# Patient Record
Sex: MALE | Race: AMERICAN INDIAN OR ALASKA NATIVE | Employment: OTHER | URBAN - METROPOLITAN AREA
[De-identification: names, ages, dates, MRNs, and addresses within clinical notes are randomized per-mention and may not be internally consistent; named-entity substitution may affect disease eponyms.]

---

## 2017-01-04 ENCOUNTER — HOSPITAL ENCOUNTER (OUTPATIENT)
Dept: PHYSICAL THERAPY | Age: 77
Discharge: HOME | End: 2017-01-04
Payer: MEDICARE

## 2017-01-04 PROCEDURE — 97113 AQUATIC THERAPY/EXERCISES: CPT

## 2017-01-04 NOTE — PROGRESS NOTES
Milagro Pineda   (:1940) Fallon Rushomon [address]  [address] [place] [address]  [phone]   [phone]           Outpatient PHYSICAL THERAPY: Daily Note 2017  Fall Risk Score: 2 (? 5 = High Risk)    ICD-10: Treatment Diagnosis: Pain in left knee (M25.562)  Difficulty in walking, not elsewhere classified (R26.2)  REFERRING PHYSICIAN: Indiana Ramos MD MD Orders: Eval and Treat   Return Physician Appointment: 16  MEDICAL/REFERRING DIAGNOSIS: Total knee replacement status [Z96.659]  DATE OF ONSET: 10/28/16   PRIOR LEVEL OF FUNCTION: independent  PRECAUTIONS/ALLERGIES:   Allergies   Allergen Reactions    Prednisone Unknown (comments)       ASSESSMENT:  ?????? ? ? This section established at most recent assessment?????????? Pt is a 63 y/o male L knee pain and difficulty walking s/p L TKA L. Pt has L knee edema, decreased ROM and strength as well as altered gait and decreased balance. Pt will benefit from PT to address gait, balance, ROM, strengthening and pain/edema control. Pt will begin in aquatic environment to increase mobility and gait in decreased load environment secondary to OA and unrelated LBP to return to increased functional activities. PROBLEM LIST (Impairments causing functional limitations):  1. Decreased Strength affecting function  2. Edema affecting function  3. Decreased ADL/Functional Activities  4. Decreased Flexibility/joint mobility  5. Decreased transfer abilities  6. Increased Pain affecting function  7. Decreased Activity tolerance   GOALS: (Goals have been discussed and agreed upon with patient.)  SHORT-TERM FUNCTIONAL GOALS: Time Frame: 3 weeks  1. Pt will increase L knee AROM extension to within 2° of neutral for improved gait mechanics. (MET)  2. Pt will be able to transfer from chairs sit to stand with only use of one UE.    3.  Pt will be independent with HEP to increase mobility and strength to increase functional mobility. (MET)  DISCHARGE GOALS: Time Frame: 8-12 weeks  1. Pt will be able to ambulate up/down one flight of stairs reciprocally with min use of one hand rail. 2. Pt will be able to ambulate community distances with normalized gait independently with only intermittent use of cane for contralateral knee. (MET)  3. Pt will demonstrate at least 4+/5 L LE strength for squatting and other functional demands. 5. Pt will increase L knee AROM 0-125 ° or greater for increased functional mobility. (MET)  6. Pt will be able to single leg stance at least 10 seconds LLE to increase balance. REHABILITATION POTENTIAL FOR STATED GOALS: ExcellentPLAN OF CARE:  INTERVENTIONS PLANNED: (Benefits and precautions of physical therapy have been discussed with the patient.)  1. balance exercise  2. electrical stimulation  3. gait training  4. heat  5. home exercise program (HEP)  6. manual therapy  7. neuromuscular re-education/strengthening  8. range of motion: active/assisted/passive  9. therapeutic activities  10. therapeutic exercise/strengthening  11. transfer training  12. aquatics  TREATMENT PLAN EFFECTIVE DATES: 12/14/16 TO 03/08/17  FREQUENCY/DURATION: Follow patient 2-3 times a week for 8-12 weeks to address above goals. SUBJECTIVE:    History of Present Injury/Illness (Reason for Referral): Pt has chronic bilateral knee pain secondary to OA and underwent TKA 10/28/16. Pt was progressing well with home health and then discharged to outpatient at the end of Nov. 2016. Pt had to cancel his previous initial evaluation and delayed rescheduling initial evaluation for outpatient PT. Pt states he wants to be as active as he can and \"be able to do everything. \"  Pt states he is planning on having a TKA with his R knee after rehab of this L knee. Pt reports difficulty with transfers still, decreased tolerance with walking, as well as difficulty with stairs and squatting.   Pt states he has problems with sleeping but also has LBP that keeps him from sleeping well. Present Symptoms: Pt reports doing well with no pain. States his surgery date has been moved up to  for R TKA  Dominant Side: right  Past Medical History:   Past Medical History   Diagnosis Date    GERD (gastroesophageal reflux disease)     Hypertension    DM --controlled with meds  THR   Pt reports over a year ago seeing Dr. Pratibha Rodriguez for LBP and having a fracture around L1 that was supposed to heal on its own; pt has some LBP and is following up with Dr. Bib oHwe.     Current Medications:   Current Outpatient Prescriptions on File Prior to Encounter   Medication Sig Dispense Refill    amLODIPine (NORVASC) 2.5 mg tablet       metFORMIN (GLUCOPHAGE) 500 mg tablet       omeprazole (PRILOSEC) 40 mg capsule       triamcinolone acetonide (KENALOG) 0.1 % topical cream       zolpidem (AMBIEN) 5 mg tablet Take  by mouth nightly as needed for Sleep.  sucralfate (CARAFATE) 1 gram tablet Take 1 g by mouth four (4) times daily. No current facility-administered medications on file prior to encounter. Using Tylenol prn for pain     Date Last Reviewed: 2017    Social History/Home Situation: lives in home and his son living with him; states he is able to do housework and yard work prior to surgery; wife  earlier this year and had cared for her over the past few years     Work/Activity History: retired ; enjoys staying active; sings at [employer] regularly  OBJECTIVE:    Outcome Measure: Tool Used: Lower Extremity Functional Scale (LEFS)  Score:  Initial: 35/80 Most Recent:  (Date: -- )   Interpretation of Score: 20 questions each scored on a 5 point scale with 0 representing \"extreme difficulty or unable to perform\" and 4 representing \"no difficulty\". The lower the score, the greater the functional disability. 80/80 represents no disability. Minimal detectable change is 9 points.   Score 80 79-63 62-48 47-32 31-16 15-1 0   Modifier CH CI CJ CK CL CM CN     ? Mobility - Walking and Moving Around:     - CURRENT STATUS: CK - 40%-59% impaired, limited or restricted    - GOAL STATUS:  CJ - 20%-39% impaired, limited or restricted--y because R knee may impact functional level    - D/C STATUS:  ---------------To be determined---------------    Observation/Orthostatic Postural Assessment:    increased rounded shoulders; standing with weigh shifted more on RLE  Palpation:   mild tenderness around L knee with mild edema L knee; decreased scar mobility   ROM:    AROM L Knee: 10 ° extensor lag with extension    Flexion to 113 °   AAROM 2-120 °    12/30/16  L knee 0-129 °               AROM R knee: 0-132 °          Strength:    Hip Flexion: L3+/5 R 4-/5   Quadricep:  L 4-/5    R 4+/5   Hamstring:   L4-/5   R: 4+/5   Ant Tibialis:  L4+/5   R 5/5              Special Tests: deferred  Gait: Ambulating with straight point cane with decreased L knee terminal knee extension   Functional Mobility:   sit to stand Mod I with use of UEs   Balance:    decreased dynamic balance single leg stance less than 3 seconds bilaterally  TREATMENT:    (In addition to Assessment/Re-Assessment sessions the following treatments were rendered)  Aquatic Therapy (see chart below for minutes): Aquatic treatment performed per flow grid for Decreased muscle strength, Decreased range of motion and Ease of movement. Cues provided for technique. Assistance by therapist provided for progression of exercises. Patient has difficulty with L knee ROM. THERAPEUTIC EXERCISE: (See chart below for minutes):  Exercises per grid below to improve mobility and strength. Required minimal verbal cues to promote proper body mechanics.       Date: 12/14/16 12/16/16 12/19/16 12/21/16 12/28/16 12/30/16 1/4/16   Modalities: 10 minutes 10 min 10 min 15 min      IFC/e-stim with ice anteroir L knee 10 min in long seated position In long sitting to L knee 10 min long siting 15 min long sitting                          Therapeutic Exercise: 15 minutes    45 min     Quad sets x15         Heel slides x15         SLR x20    x15 BLE 2#     Seated hamstring stretch 3 H 30         LAQ 2# x15         Seated marching     x15 2# BLE 2#     Side lying abd     x15 2# BLE      Heel/toe     x15     squats     x15     Hamstring curls x15 standing    Seated green TB x15 BLE     Step ups     x10 BLE     Aquatic Exercises next 1.5# 45 min 2.5# 45 min 2.5# 40 min 3.75# 55 min  3.75# 55 mi   Gait (Forward, Backward, Sidestepping, High Knee March)   x4L each x4L ea x4L x4L with open paddles   open paddles x4L   SLR gait  x4L x4L x4L x4L   x4L   Hamstring curl gait  x4L x4L x4L x4L  x4L   Toe/ heel raise  x2L each x2L ea x2L Single leg x15  Single leg x15   Hip 3 way  x15 BLE x15 BLE  x15 BLE     Squats  x15 x15 x15 Single leg x15  Single leg x15   Deep well:  cycling  3 min 3 min 3 min 3 min  3 min   Deep well:  Jumping jacks  2 min 2 min 2 min 2 min  2 min   Deep well:  scissoring  2 min 2 min 2 min 2 min  2 min   Step lunges  x15 LLE x15 LLE x15 LLE x15 LLE  x15   Hamstring stretch  2H30 BLE 2H30 2H30 2H30     Step ups   x15 LLE x15 LLE x15 LLE  x15 LLE   Core; UE flex/ext staggered stance       x15 BLE open paddles   Manual Therapy: 8 minutes         Scar cross-friction mobilization  L knee 8 minutes                   Post-session Pain Level 0/10                                                     MANUAL THERAPY: (See chart above for minutes): MODALITIES: (See chart above for minutes)      HEP: As above; handouts given to patient for all exercises. Pt has home health HEP but has not been a diligent as he thinks he needs to be.  ______________________________________________________________________________________________________    Treatment Assessment: Continued aquatics and pt tolerated well.  Performed exercises without railing today to increase proprioception which pt struggles with. Plan to continue adding  balance activities in next visits. Pt reported 0/10 pain at end of treatment. Progression/Medical Necessity:   · Patient is expected to demonstrate progress in strength, range of motion and balance to increase independence with gait and daily activities. Compliance with Program/Exercises: Will assess as treatment progresses. Reason for Continuation of Services/Other Comments:  · Patient continues to require skilled intervention due to altered gait and difficulty with functional activities. Recommendations/Intent for next treatment session: \"Treatment next visit will focus on beginning aquatics\". Plan to continue aquatics 2 more visits and then d/c pending R TKA Jan 23.   PT Patient Time In/Time Out  Time In: 0920  Time Out: [address], PTA

## 2017-01-06 ENCOUNTER — HOSPITAL ENCOUNTER (OUTPATIENT)
Dept: PHYSICAL THERAPY | Age: 77
Discharge: HOME | End: 2017-01-06
Payer: MEDICARE

## 2017-01-06 PROCEDURE — 97113 AQUATIC THERAPY/EXERCISES: CPT

## 2017-01-06 NOTE — PROGRESS NOTES
Priscila Esparza   (:1940) [phone] [facility]  [address] [place] [address].  [phone]   [phone]           Outpatient PHYSICAL THERAPY: Daily Note 2017  Fall Risk Score: 2 (? 5 = High Risk)    ICD-10: Treatment Diagnosis: Pain in left knee (M25.562)  Difficulty in walking, not elsewhere classified (R26.2)  REFERRING PHYSICIAN: Rashmi Jarrett MD MD Orders: Eval and Treat   Return Physician Appointment: 16  MEDICAL/REFERRING DIAGNOSIS: Total knee replacement status [Z96.659]  DATE OF ONSET: 10/28/16   PRIOR LEVEL OF FUNCTION: independent  PRECAUTIONS/ALLERGIES:   Allergies   Allergen Reactions    Prednisone Unknown (comments)       ASSESSMENT:  ?????? ? ? This section established at most recent assessment?????????? Pt is a 63 y/o male L knee pain and difficulty walking s/p L TKA L. Pt has L knee edema, decreased ROM and strength as well as altered gait and decreased balance. Pt will benefit from PT to address gait, balance, ROM, strengthening and pain/edema control. Pt will begin in aquatic environment to increase mobility and gait in decreased load environment secondary to OA and unrelated LBP to return to increased functional activities. PROBLEM LIST (Impairments causing functional limitations):  1. Decreased Strength affecting function  2. Edema affecting function  3. Decreased ADL/Functional Activities  4. Decreased Flexibility/joint mobility  5. Decreased transfer abilities  6. Increased Pain affecting function  7. Decreased Activity tolerance   GOALS: (Goals have been discussed and agreed upon with patient.)  SHORT-TERM FUNCTIONAL GOALS: Time Frame: 3 weeks  1. Pt will increase L knee AROM extension to within 2° of neutral for improved gait mechanics. (MET)  2. Pt will be able to transfer from chairs sit to stand with only use of one UE.    3.  Pt will be independent with HEP to increase mobility and strength to increase functional mobility. (MET)  DISCHARGE GOALS: Time Frame: 8-12 weeks  1. Pt will be able to ambulate up/down one flight of stairs reciprocally with min use of one hand rail. 2. Pt will be able to ambulate community distances with normalized gait independently with only intermittent use of cane for contralateral knee. (MET)  3. Pt will demonstrate at least 4+/5 L LE strength for squatting and other functional demands. 5. Pt will increase L knee AROM 0-125 ° or greater for increased functional mobility. (MET)  6. Pt will be able to single leg stance at least 10 seconds LLE to increase balance. REHABILITATION POTENTIAL FOR STATED GOALS: ExcellentPLAN OF CARE:  INTERVENTIONS PLANNED: (Benefits and precautions of physical therapy have been discussed with the patient.)  1. balance exercise  2. electrical stimulation  3. gait training  4. heat  5. home exercise program (HEP)  6. manual therapy  7. neuromuscular re-education/strengthening  8. range of motion: active/assisted/passive  9. therapeutic activities  10. therapeutic exercise/strengthening  11. transfer training  12. aquatics  TREATMENT PLAN EFFECTIVE DATES: 12/14/16 TO 03/08/17  FREQUENCY/DURATION: Follow patient 2-3 times a week for 8-12 weeks to address above goals. SUBJECTIVE:    History of Present Injury/Illness (Reason for Referral): Pt has chronic bilateral knee pain secondary to OA and underwent TKA 10/28/16. Pt was progressing well with home health and then discharged to outpatient at the end of Nov. 2016. Pt had to cancel his previous initial evaluation and delayed rescheduling initial evaluation for outpatient PT. Pt states he wants to be as active as he can and \"be able to do everything. \"  Pt states he is planning on having a TKA with his R knee after rehab of this L knee. Pt reports difficulty with transfers still, decreased tolerance with walking, as well as difficulty with stairs and squatting.   Pt states he has problems with sleeping but also has LBP that keeps him from sleeping well. Present Symptoms: Pt reports doing well with no pain. States he is having an MRI next week for low back issue. Dominant Side: right  Past Medical History:   Past Medical History   Diagnosis Date    GERD (gastroesophageal reflux disease)     Hypertension    DM --controlled with meds  THR   Pt reports over a year ago seeing Dr. Krystina Venegas for LBP and having a fracture around L1 that was supposed to heal on its own; pt has some LBP and is following up with Dr. Miguel Hanna.     Current Medications:   Current Outpatient Prescriptions on File Prior to Encounter   Medication Sig Dispense Refill    amLODIPine (NORVASC) 2.5 mg tablet       metFORMIN (GLUCOPHAGE) 500 mg tablet       omeprazole (PRILOSEC) 40 mg capsule       triamcinolone acetonide (KENALOG) 0.1 % topical cream       zolpidem (AMBIEN) 5 mg tablet Take  by mouth nightly as needed for Sleep.  sucralfate (CARAFATE) 1 gram tablet Take 1 g by mouth four (4) times daily. No current facility-administered medications on file prior to encounter. Using Tylenol prn for pain     Date Last Reviewed: 2017    Social History/Home Situation: lives in home and his son living with him; states he is able to do housework and yard work prior to surgery; wife  earlier this year and had cared for her over the past few years     Work/Activity History: retired ; enjoys staying active; sings at [employer] regularly  OBJECTIVE:    Outcome Measure: Tool Used: Lower Extremity Functional Scale (LEFS)  Score:  Initial: 35/80 Most Recent:  (Date: -- )   Interpretation of Score: 20 questions each scored on a 5 point scale with 0 representing \"extreme difficulty or unable to perform\" and 4 representing \"no difficulty\". The lower the score, the greater the functional disability. 80/80 represents no disability. Minimal detectable change is 9 points.   Score 80 79-63 62-48 47-32 31-16 15-1 0   Modifier CH CI CJ CK CL CM CN     ? Mobility - Walking and Moving Around:     - CURRENT STATUS: CK - 40%-59% impaired, limited or restricted    - GOAL STATUS:  CJ - 20%-39% impaired, limited or restricted--y because R knee may impact functional level    - D/C STATUS:  ---------------To be determined---------------    Observation/Orthostatic Postural Assessment:    increased rounded shoulders; standing with weigh shifted more on RLE  Palpation:   mild tenderness around L knee with mild edema L knee; decreased scar mobility   ROM:    AROM L Knee: 10 ° extensor lag with extension    Flexion to 113 °   AAROM 2-120 °    12/30/16  L knee 0-129 °               AROM R knee: 0-132 °          Strength:    Hip Flexion: L3+/5 R 4-/5   Quadricep:  L 4-/5    R 4+/5   Hamstring:   L4-/5   R: 4+/5   Ant Tibialis:  L4+/5   R 5/5              Special Tests: deferred  Gait: Ambulating with straight point cane with decreased L knee terminal knee extension   Functional Mobility:   sit to stand Mod I with use of UEs   Balance:    decreased dynamic balance single leg stance less than 3 seconds bilaterally  TREATMENT:    (In addition to Assessment/Re-Assessment sessions the following treatments were rendered)  Aquatic Therapy (see chart below for minutes): Aquatic treatment performed per flow grid for Decreased muscle strength, Decreased range of motion and Ease of movement. Cues provided for technique. Assistance by therapist provided for progression of exercises. Patient has difficulty with L knee ROM. THERAPEUTIC EXERCISE: (See chart below for minutes):  Exercises per grid below to improve mobility and strength. Required minimal verbal cues to promote proper body mechanics.       Date: 12/14/16 12/16/16 12/19/16 12/21/16 12/28/16 12/30/16 1/4/16 1/6/17   Modalities: 10 minutes 10 min 10 min 15 min       IFC/e-stim with ice anteroir L knee 10 min in long seated position In long sitting to L knee 10 min long siting 15 min long sitting                             Therapeutic Exercise: 15 minutes    45 min      Quad sets x15          Heel slides x15          SLR x20    x15 BLE 2#      Seated hamstring stretch 3 H 30          LAQ 2# x15          Seated marching     x15 2# BLE 2#      Side lying abd     x15 2# BLE       Heel/toe     x15      squats     x15      Hamstring curls x15 standing    Seated green TB x15 BLE      Step ups     x10 BLE      Aquatic Exercises next 1.5# 45 min 2.5# 45 min 2.5# 40 min 3.75# 55 min  3.75# 55 mi 3.75# 55 min   Gait (Forward, Backward, Sidestepping, High Knee March)   x4L each x4L ea x4L x4L with open paddles   open paddles x4L Closed paddles x4L   SLR gait  x4L x4L x4L x4L   x4L x4L   Hamstring curl gait  x4L x4L x4L x4L  x4L x4L   Toe/ heel raise  x2L each x2L ea x2L Single leg x15  Single leg x15 Single leg x20   Hip 3 way  x15 BLE x15 BLE  x15 BLE      Squats  x15 x15 x15 Single leg x15  Single leg x15 Single leg x20   Deep well:  cycling  3 min 3 min 3 min 3 min  3 min 3 min   Deep well:  Jumping jacks  2 min 2 min 2 min 2 min  2 min 2 min   Deep well:  scissoring  2 min 2 min 2 min 2 min  2 min 2 min   Step lunges  x15 LLE x15 LLE x15 LLE x15 LLE  x15 x20LLE   Hamstring stretch  2H30 BLE 2H30 2H30 2H30      Step ups   x15 LLE x15 LLE x15 LLE  x15 LLE x20 LLE   Core; UE flex/ext staggered stance       x15 BLE open paddles x15 closed paddles BLE   Manual Therapy: 8 minutes          Scar cross-friction mobilization  L knee 8 minutes                     Post-session Pain Level 0/10                                                          MANUAL THERAPY: (See chart above for minutes): MODALITIES: (See chart above for minutes)      HEP: As above; handouts given to patient for all exercises.   Pt has home health HEP but has not been a diligent as he thinks he needs to be.  ______________________________________________________________________________________________________    Treatment Assessment: Continued aquatics and pt tolerated well. Performed exercises without railing today to increase proprioception which pt struggles with. Pt has progressed well and has upcoming R knee TKA, plan to d/c next visit. Pt reported 0/10 pain at end of treatment. Progression/Medical Necessity:   · Patient is expected to demonstrate progress in strength, range of motion and balance to increase independence with gait and daily activities. Compliance with Program/Exercises: Will assess as treatment progresses. Reason for Continuation of Services/Other Comments:  · Patient continues to require skilled intervention due to altered gait and difficulty with functional activities. Recommendations/Intent for next treatment session: \"Treatment next visit will focus on beginning aquatics\". Plan to continue aquatics 2 more visits and then d/c pending R TKA Jan 23.   PT Patient Time In/Time Out  Time In: 0930  Time Out: [address], PTA

## 2017-01-09 ENCOUNTER — APPOINTMENT (OUTPATIENT)
Dept: PHYSICAL THERAPY | Age: 77
End: 2017-01-09
Payer: MEDICARE

## 2017-01-11 ENCOUNTER — HOSPITAL ENCOUNTER (OUTPATIENT)
Dept: PHYSICAL THERAPY | Age: 77
Discharge: HOME | End: 2017-01-11
Payer: MEDICARE

## 2017-01-11 PROCEDURE — G8979 MOBILITY GOAL STATUS: HCPCS

## 2017-01-11 PROCEDURE — 97113 AQUATIC THERAPY/EXERCISES: CPT

## 2017-01-11 PROCEDURE — G8978 MOBILITY CURRENT STATUS: HCPCS

## 2017-01-11 PROCEDURE — G8980 MOBILITY D/C STATUS: HCPCS

## 2017-01-11 NOTE — PROGRESS NOTES
Flaquito Qiu   [phone] [facility],[address]  [facility] [address]  [phone]   [phone]           Outpatient PHYSICAL THERAPY: Daily Note and Discharge 2017  Fall Risk Score: 2 (? 5 = High Risk)    ICD-10: Treatment Diagnosis: Pain in left knee (M25.562)  Difficulty in walking, not elsewhere classified (R26.2)  REFERRING PHYSICIAN: Galileo Meier MD MD Orders: Eval and Treat   Return Physician Appointment: 16  MEDICAL/REFERRING DIAGNOSIS: Total knee replacement status [Z96.659]  DATE OF ONSET: 10/28/16   PRIOR LEVEL OF FUNCTION: independent  PRECAUTIONS/ALLERGIES:   Allergies   Allergen Reactions    Prednisone Unknown (comments)       ASSESSMENT:  ?????? ? ? This section established at most recent assessment?????????? Pt is a 65 y/o male L knee pain and difficulty walking s/p L TKA L. Pt has L knee edema, decreased ROM and strength as well as altered gait and decreased balance. Pt will benefit from PT to address gait, balance, ROM, strengthening and pain/edema control. Pt will begin in aquatic environment to increase mobility and gait in decreased load environment secondary to OA and unrelated LBP to return to increased functional activities. PROBLEM LIST (Impairments causing functional limitations):  1. Decreased Strength affecting function  2. Edema affecting function  3. Decreased ADL/Functional Activities  4. Decreased Flexibility/joint mobility  5. Decreased transfer abilities  6. Increased Pain affecting function  7. Decreased Activity tolerance   GOALS: (Goals have been discussed and agreed upon with patient.)  SHORT-TERM FUNCTIONAL GOALS: Time Frame: 3 weeks  1. Pt will increase L knee AROM extension to within 2° of neutral for improved gait mechanics. (MET)  2. Pt will be able to transfer from chairs sit to stand with only use of one UE.  (MET)  3.  Pt will be independent with HEP to increase mobility and strength to increase functional mobility. (MET)  DISCHARGE GOALS: Time Frame: 8-12 weeks  1. Pt will be able to ambulate up/down one flight of stairs reciprocally with min use of one hand rail. MET)  2. Pt will be able to ambulate community distances with normalized gait independently with only intermittent use of cane for contralateral knee. (MET)  3. Pt will demonstrate at least 4+/5 L LE strength for squatting and other functional demands. (MET)  5. Pt will increase L knee AROM 0-125 ° or greater for increased functional mobility. (MET)  6. Pt will be able to single leg stance at least 10 seconds LLE to increase balance. (MET)  REHABILITATION POTENTIAL FOR STATED GOALS: ExcellentPLAN OF CARE:  INTERVENTIONS PLANNED: (Benefits and precautions of physical therapy have been discussed with the patient.)  1. balance exercise  2. electrical stimulation  3. gait training  4. heat  5. home exercise program (HEP)  6. manual therapy  7. neuromuscular re-education/strengthening  8. range of motion: active/assisted/passive  9. therapeutic activities  10. therapeutic exercise/strengthening  11. transfer training  12. aquatics  TREATMENT PLAN EFFECTIVE DATES: 12/14/16 TO 03/08/17  FREQUENCY/DURATION: Follow patient 2-3 times a week for 8-12 weeks to address above goals. SUBJECTIVE:    History of Present Injury/Illness (Reason for Referral): Pt has chronic bilateral knee pain secondary to OA and underwent TKA 10/28/16. Pt was progressing well with home health and then discharged to outpatient at the end of Nov. 2016. Pt had to cancel his previous initial evaluation and delayed rescheduling initial evaluation for outpatient PT. Pt states he wants to be as active as he can and \"be able to do everything. \"  Pt states he is planning on having a TKA with his R knee after rehab of this L knee. Pt reports difficulty with transfers still, decreased tolerance with walking, as well as difficulty with stairs and squatting.   Pt states he has problems with sleeping but also has LBP that keeps him from sleeping well. Present Symptoms: Pt reports doing well with no pain and will have pre op tomorrow for R TKA. Dominant Side: right  Past Medical History:   Past Medical History   Diagnosis Date    GERD (gastroesophageal reflux disease)     Hypertension    DM --controlled with meds  THR   Pt reports over a year ago seeing Dr. Bennie Mei for LBP and having a fracture around L1 that was supposed to heal on its own; pt has some LBP and is following up with Dr. Andreea Barr.     Current Medications:   Current Outpatient Prescriptions on File Prior to Encounter   Medication Sig Dispense Refill    amLODIPine (NORVASC) 2.5 mg tablet       metFORMIN (GLUCOPHAGE) 500 mg tablet       omeprazole (PRILOSEC) 40 mg capsule       triamcinolone acetonide (KENALOG) 0.1 % topical cream       zolpidem (AMBIEN) 5 mg tablet Take  by mouth nightly as needed for Sleep.  sucralfate (CARAFATE) 1 gram tablet Take 1 g by mouth four (4) times daily. No current facility-administered medications on file prior to encounter. 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 The lower the score, the greater the functional disability. 80/80 represents no disability.   Minimal detectable change is 9 points. Score 80 79-63 62-48 47-32 31-16 15-1 0   Modifier CH CI CJ CK CL CM CN     ? Mobility - Walking and Moving Around:     - CURRENT STATUS: CJ - 20%-39% impaired, limited or restricted    - GOAL STATUS:  CJ - 20%-39% impaired, limited or restricted--y because R knee may impact functional level    - D/C STATUS:  CJ - 20%-39% impaired, limited or restricted    Observation/Orthostatic Postural Assessment:    increased rounded shoulders; standing with weigh shifted more on RLE  Palpation:   mild tenderness around L knee with mild edema L knee; decreased scar mobility   ROM:    AROM L Knee: 10 ° extensor lag with extension    Flexion to 113 °   AAROM 2-120 °    12/30/16  L knee 0-129 °                   AROM R knee: 0-132 °    1/11/17 R KNEE 0-132 °            Strength:    Hip Flexion: L3+/5 R 4-/5   Quadricep:  L 4-/5    R 4+/5   Hamstring:   L4-/5   R: 4+/5   Ant Tibialis:  L4+/5   R 5/5     1/11/17:  L knee: hip flexion 4+/5              Knee flexion :4+/5              Knee extension: 4+/5         Special Tests: deferred  Gait: Ambulating with straight point cane with decreased L knee terminal knee extension   Functional Mobility:   sit to stand Mod I with use of UEs   Balance:      TREATMENT:    (In addition to Assessment/Re-Assessment sessions the following treatments were rendered)  Aquatic Therapy (see chart below for minutes): Aquatic treatment performed per flow grid for Decreased muscle strength, Decreased range of motion and Ease of movement. Cues provided for technique. Assistance by therapist provided for progression of exercises. Patient has difficulty with L knee ROM. THERAPEUTIC EXERCISE: (See chart below for minutes):  Exercises per grid below to improve mobility and strength. Required minimal verbal cues to promote proper body mechanics.       Date: 12/14/16 12/16/16 12/19/16 12/21/16 12/28/16 12/30/16 1/4/16 1/6/17 1/11/17   Modalities: 10 minutes 10 min 10 min 15 min IFC/e-stim with ice anteroir L knee 10 min in long seated position In long sitting to L knee 10 min long siting 15 min long sitting                                Therapeutic Exercise: 15 minutes    45 min       Quad sets x15           Heel slides x15           SLR x20    x15 BLE 2#       Seated hamstring stretch 3 H 30           LAQ 2# x15           Seated marching     x15 2# BLE 2#       Side lying abd     x15 2# BLE        Heel/toe     x15       squats     x15       Hamstring curls x15 standing    Seated green TB x15 BLE       Step ups     x10 BLE       Aquatic Exercises next 1.5# 45 min 2.5# 45 min 2.5# 40 min 3.75# 55 min  3.75# 55 mi 3.75# 55 min 33. 75# 55 MIN   Gait (Forward, Backward, Sidestepping, High Knee March)   x4L each x4L ea x4L x4L with open paddles   open paddles x4L Closed paddles x4L Closed paddles x4L   SLR gait  x4L x4L x4L x4L   x4L x4L x4L   Hamstring curl gait  x4L x4L x4L x4L  x4L x4L x4L   Toe/ heel raise  x2L each x2L ea x2L Single leg x15  Single leg x15 Single leg x20 single x20   Hip 3 way  x15 BLE x15 BLE  x15 BLE       Squats  x15 x15 x15 Single leg x15  Single leg x15 Single leg x20 Single x20   Deep well:  cycling  3 min 3 min 3 min 3 min  3 min 3 min 3 min   Deep well:  Jumping jacks  2 min 2 min 2 min 2 min  2 min 2 min 2 min   Deep well:  scissoring  2 min 2 min 2 min 2 min  2 min 2 min 2 min   Step lunges  x15 LLE x15 LLE x15 LLE x15 LLE  x15 x20LLE x2L   Hamstring stretch  2H30 BLE 2H30 2H30 2H30       Step ups   x15 LLE x15 LLE x15 LLE  x15 LLE x20 LLE Stairs one flight one railing up/down   SLS         2x10 sec LLE   Core; UE flex/ext staggered stance       x15 BLE open paddles x15 closed paddles BLE    Manual Therapy: 8 minutes           Scar cross-friction mobilization  L knee 8 minutes                       Post-session Pain Level 0/10                                                               MANUAL THERAPY: (See chart above for minutes):   MODALITIES: (See chart above for minutes)      HEP: As above; handouts given to patient for all exercises. ______________________________________________________________________________________________________    Treatment Assessment: Continued aquatics and tolerated well, no pain reported. Pt has progressed well and is discharging today due to pending R TKA next week. Assessments for d/c show all goals met and pt is compliant with HEP, some limitations on LEFS due to R knee pain. Pt reported no pain at end of treatment. No further skilled therapy required at this time. Progression/Medical Necessity:   ·   Compliance with Program/Exercises:   Reason for Continuation of Services/Other Comments:  ·   Recommendations/Intent for next treatment session: Pt discharged today to  in community with no further skilled therapy required at this time for L knee.   PT Patient Time In/Time Out  Time In: 0800  Time Out: Ronks Sel[place]ik,[address], PTA

## 2017-01-17 ENCOUNTER — ANESTHESIA EVENT (OUTPATIENT)
Dept: SURGERY | Age: 77
End: 2017-01-17
Payer: MEDICARE

## 2017-01-17 ENCOUNTER — ANESTHESIA (OUTPATIENT)
Dept: SURGERY | Age: 77
End: 2017-01-17
Payer: MEDICARE

## 2017-01-17 ENCOUNTER — APPOINTMENT (OUTPATIENT)
Dept: GENERAL RADIOLOGY | Age: 77
End: 2017-01-17
Attending: ORTHOPAEDIC SURGERY
Payer: MEDICARE

## 2017-01-17 ENCOUNTER — HOSPITAL ENCOUNTER (OUTPATIENT)
Age: 77
Setting detail: OUTPATIENT SURGERY
Discharge: HOME | End: 2017-01-17
Attending: ORTHOPAEDIC SURGERY | Admitting: ORTHOPAEDIC SURGERY
Payer: MEDICARE

## 2017-01-17 VITALS
OXYGEN SATURATION: 93 % | SYSTOLIC BLOOD PRESSURE: 145 MMHG | HEIGHT: 70 IN | TEMPERATURE: 97.8 F | WEIGHT: 168 LBS | BODY MASS INDEX: 24.05 KG/M2 | HEART RATE: 87 BPM | RESPIRATION RATE: 20 BRPM | DIASTOLIC BLOOD PRESSURE: 81 MMHG

## 2017-01-17 PROBLEM — M80.08XA VERTEBRAL FRACTURE, OSTEOPOROTIC (HCC): Status: ACTIVE | Noted: 2017-01-17

## 2017-01-17 LAB — GLUCOSE BLD STRIP.AUTO-MCNC: 130 MG/DL (ref 65–100)

## 2017-01-17 PROCEDURE — 76010000161 HC OR TIME 1 TO 1.5 HR INTENSV-TIER 1: Performed by: ORTHOPAEDIC SURGERY

## 2017-01-17 PROCEDURE — 74011636320 HC RX REV CODE- 636/320: Performed by: ORTHOPAEDIC SURGERY

## 2017-01-17 PROCEDURE — 76210000020 HC REC RM PH II FIRST 0.5 HR: Performed by: ORTHOPAEDIC SURGERY

## 2017-01-17 PROCEDURE — 77030020782 HC GWN BAIR PAWS FLX 3M -B: Performed by: NURSE ANESTHETIST, CERTIFIED REGISTERED

## 2017-01-17 PROCEDURE — 74011000250 HC RX REV CODE- 250

## 2017-01-17 PROCEDURE — 77030028759 HC KT SPN BN TAMP FF KYPH -I2: Performed by: ORTHOPAEDIC SURGERY

## 2017-01-17 PROCEDURE — 82962 GLUCOSE BLOOD TEST: CPT

## 2017-01-17 PROCEDURE — 76210000063 HC OR PH I REC FIRST 0.5 HR: Performed by: ORTHOPAEDIC SURGERY

## 2017-01-17 PROCEDURE — C1713 ANCHOR/SCREW BN/BN,TIS/BN: HCPCS | Performed by: ORTHOPAEDIC SURGERY

## 2017-01-17 PROCEDURE — 77030032490 HC SLV COMPR SCD KNE COVD -B: Performed by: ORTHOPAEDIC SURGERY

## 2017-01-17 PROCEDURE — 88311 DECALCIFY TISSUE: CPT | Performed by: ORTHOPAEDIC SURGERY

## 2017-01-17 PROCEDURE — 88341 IMHCHEM/IMCYTCHM EA ADD ANTB: CPT

## 2017-01-17 PROCEDURE — 74011000250 HC RX REV CODE- 250: Performed by: ORTHOPAEDIC SURGERY

## 2017-01-17 PROCEDURE — 77030008477 HC STYL SATN SLP COVD -A: Performed by: NURSE ANESTHETIST, CERTIFIED REGISTERED

## 2017-01-17 PROCEDURE — 88305 TISSUE EXAM BY PATHOLOGIST: CPT | Performed by: ORTHOPAEDIC SURGERY

## 2017-01-17 PROCEDURE — 72100 X-RAY EXAM L-S SPINE 2/3 VWS: CPT

## 2017-01-17 PROCEDURE — 77030030399: Performed by: ORTHOPAEDIC SURGERY

## 2017-01-17 PROCEDURE — 74011250636 HC RX REV CODE- 250/636

## 2017-01-17 PROCEDURE — 74011250636 HC RX REV CODE- 250/636: Performed by: ANESTHESIOLOGY

## 2017-01-17 PROCEDURE — 76060000033 HC ANESTHESIA 1 TO 1.5 HR: Performed by: ORTHOPAEDIC SURGERY

## 2017-01-17 PROCEDURE — 88342 IMHCHEM/IMCYTCHM 1ST ANTB: CPT

## 2017-01-17 PROCEDURE — 74011250636 HC RX REV CODE- 250/636: Performed by: ORTHOPAEDIC SURGERY

## 2017-01-17 PROCEDURE — 77030002916 HC SUT ETHLN J&J -A: Performed by: ORTHOPAEDIC SURGERY

## 2017-01-17 PROCEDURE — 77030008703 HC TU ET UNCUF COVD -A: Performed by: NURSE ANESTHETIST, CERTIFIED REGISTERED

## 2017-01-17 PROCEDURE — 77030019908 HC STETH ESOPH SIMS -A: Performed by: NURSE ANESTHETIST, CERTIFIED REGISTERED

## 2017-01-17 PROCEDURE — 77030011218 HC DEV BIOP BN KYPH -C: Performed by: ORTHOPAEDIC SURGERY

## 2017-01-17 DEVICE — BONE CEMENT CX01A XPEDE US
Type: IMPLANTABLE DEVICE | Site: SPINE LUMBAR | Status: FUNCTIONAL
Brand: KYPHON® XPEDE™ BONE CEMENT

## 2017-01-17 RX ORDER — HYDROCODONE BITARTRATE AND ACETAMINOPHEN 5; 325 MG/1; MG/1
1-2 TABLET ORAL
Qty: 50 TAB | Refills: 0 | Status: SHIPPED | OUTPATIENT
Start: 2017-01-17 | End: 2018-05-04

## 2017-01-17 RX ORDER — ONDANSETRON 2 MG/ML
INJECTION INTRAMUSCULAR; INTRAVENOUS AS NEEDED
Status: DISCONTINUED | OUTPATIENT
Start: 2017-01-17 | End: 2017-01-17 | Stop reason: HOSPADM

## 2017-01-17 RX ORDER — CEFAZOLIN SODIUM IN 0.9 % NACL 2 G/50 ML
2 INTRAVENOUS SOLUTION, PIGGYBACK (ML) INTRAVENOUS ONCE
Status: COMPLETED | OUTPATIENT
Start: 2017-01-17 | End: 2017-01-17

## 2017-01-17 RX ORDER — HYDROMORPHONE HYDROCHLORIDE 2 MG/ML
0.5 INJECTION, SOLUTION INTRAMUSCULAR; INTRAVENOUS; SUBCUTANEOUS
Status: DISCONTINUED | OUTPATIENT
Start: 2017-01-17 | End: 2017-01-17 | Stop reason: HOSPADM

## 2017-01-17 RX ORDER — SODIUM CHLORIDE, SODIUM LACTATE, POTASSIUM CHLORIDE, CALCIUM CHLORIDE 600; 310; 30; 20 MG/100ML; MG/100ML; MG/100ML; MG/100ML
125 INJECTION, SOLUTION INTRAVENOUS CONTINUOUS
Status: DISCONTINUED | OUTPATIENT
Start: 2017-01-17 | End: 2017-01-17 | Stop reason: HOSPADM

## 2017-01-17 RX ORDER — OXYCODONE HYDROCHLORIDE 5 MG/1
10 TABLET ORAL
Status: DISCONTINUED | OUTPATIENT
Start: 2017-01-17 | End: 2017-01-17 | Stop reason: HOSPADM

## 2017-01-17 RX ORDER — NEOSTIGMINE METHYLSULFATE 1 MG/ML
INJECTION INTRAVENOUS AS NEEDED
Status: DISCONTINUED | OUTPATIENT
Start: 2017-01-17 | End: 2017-01-17 | Stop reason: HOSPADM

## 2017-01-17 RX ORDER — FENTANYL CITRATE 50 UG/ML
INJECTION, SOLUTION INTRAMUSCULAR; INTRAVENOUS AS NEEDED
Status: DISCONTINUED | OUTPATIENT
Start: 2017-01-17 | End: 2017-01-17 | Stop reason: HOSPADM

## 2017-01-17 RX ORDER — SODIUM CHLORIDE 0.9 % (FLUSH) 0.9 %
5-10 SYRINGE (ML) INJECTION AS NEEDED
Status: DISCONTINUED | OUTPATIENT
Start: 2017-01-17 | End: 2017-01-17 | Stop reason: HOSPADM

## 2017-01-17 RX ORDER — LIDOCAINE HYDROCHLORIDE 20 MG/ML
INJECTION, SOLUTION EPIDURAL; INFILTRATION; INTRACAUDAL; PERINEURAL AS NEEDED
Status: DISCONTINUED | OUTPATIENT
Start: 2017-01-17 | End: 2017-01-17 | Stop reason: HOSPADM

## 2017-01-17 RX ORDER — PROPOFOL 10 MG/ML
INJECTION, EMULSION INTRAVENOUS AS NEEDED
Status: DISCONTINUED | OUTPATIENT
Start: 2017-01-17 | End: 2017-01-17 | Stop reason: HOSPADM

## 2017-01-17 RX ORDER — GLYCOPYRROLATE 0.2 MG/ML
INJECTION INTRAMUSCULAR; INTRAVENOUS AS NEEDED
Status: DISCONTINUED | OUTPATIENT
Start: 2017-01-17 | End: 2017-01-17 | Stop reason: HOSPADM

## 2017-01-17 RX ORDER — LIDOCAINE HYDROCHLORIDE 10 MG/ML
0.1 INJECTION INFILTRATION; PERINEURAL AS NEEDED
Status: DISCONTINUED | OUTPATIENT
Start: 2017-01-17 | End: 2017-01-17 | Stop reason: HOSPADM

## 2017-01-17 RX ORDER — ROCURONIUM BROMIDE 10 MG/ML
INJECTION, SOLUTION INTRAVENOUS AS NEEDED
Status: DISCONTINUED | OUTPATIENT
Start: 2017-01-17 | End: 2017-01-17 | Stop reason: HOSPADM

## 2017-01-17 RX ORDER — NALOXONE HYDROCHLORIDE 0.4 MG/ML
0.1 INJECTION, SOLUTION INTRAMUSCULAR; INTRAVENOUS; SUBCUTANEOUS AS NEEDED
Status: DISCONTINUED | OUTPATIENT
Start: 2017-01-17 | End: 2017-01-17 | Stop reason: HOSPADM

## 2017-01-17 RX ORDER — ACETAMINOPHEN 500 MG
1000 TABLET ORAL ONCE
Status: DISCONTINUED | OUTPATIENT
Start: 2017-01-17 | End: 2017-01-17 | Stop reason: HOSPADM

## 2017-01-17 RX ORDER — BUPIVACAINE HYDROCHLORIDE AND EPINEPHRINE 5; 5 MG/ML; UG/ML
INJECTION, SOLUTION EPIDURAL; INTRACAUDAL; PERINEURAL AS NEEDED
Status: DISCONTINUED | OUTPATIENT
Start: 2017-01-17 | End: 2017-01-17 | Stop reason: HOSPADM

## 2017-01-17 RX ORDER — SODIUM CHLORIDE 0.9 % (FLUSH) 0.9 %
5-10 SYRINGE (ML) INJECTION EVERY 8 HOURS
Status: DISCONTINUED | OUTPATIENT
Start: 2017-01-17 | End: 2017-01-17 | Stop reason: HOSPADM

## 2017-01-17 RX ADMIN — CEFAZOLIN 2 G: 1 INJECTION, POWDER, FOR SOLUTION INTRAMUSCULAR; INTRAVENOUS; PARENTERAL at 14:50

## 2017-01-17 RX ADMIN — ROCURONIUM BROMIDE 40 MG: 10 INJECTION, SOLUTION INTRAVENOUS at 14:50

## 2017-01-17 RX ADMIN — NEOSTIGMINE METHYLSULFATE 3 MG: 1 INJECTION INTRAVENOUS at 15:42

## 2017-01-17 RX ADMIN — FENTANYL CITRATE 50 MCG: 50 INJECTION, SOLUTION INTRAMUSCULAR; INTRAVENOUS at 15:20

## 2017-01-17 RX ADMIN — SODIUM CHLORIDE, SODIUM LACTATE, POTASSIUM CHLORIDE, AND CALCIUM CHLORIDE: 600; 310; 30; 20 INJECTION, SOLUTION INTRAVENOUS at 15:43

## 2017-01-17 RX ADMIN — PROPOFOL 160 MG: 10 INJECTION, EMULSION INTRAVENOUS at 14:50

## 2017-01-17 RX ADMIN — FENTANYL CITRATE 100 MCG: 50 INJECTION, SOLUTION INTRAMUSCULAR; INTRAVENOUS at 14:47

## 2017-01-17 RX ADMIN — SODIUM CHLORIDE, SODIUM LACTATE, POTASSIUM CHLORIDE, AND CALCIUM CHLORIDE: 600; 310; 30; 20 INJECTION, SOLUTION INTRAVENOUS at 14:44

## 2017-01-17 RX ADMIN — FENTANYL CITRATE 50 MCG: 50 INJECTION, SOLUTION INTRAMUSCULAR; INTRAVENOUS at 15:50

## 2017-01-17 RX ADMIN — GLYCOPYRROLATE 0.2 MG: 0.2 INJECTION INTRAMUSCULAR; INTRAVENOUS at 15:42

## 2017-01-17 RX ADMIN — LIDOCAINE HYDROCHLORIDE 80 MG: 20 INJECTION, SOLUTION EPIDURAL; INFILTRATION; INTRACAUDAL; PERINEURAL at 14:50

## 2017-01-17 RX ADMIN — ONDANSETRON 4 MG: 2 INJECTION INTRAMUSCULAR; INTRAVENOUS at 15:22

## 2017-01-17 NOTE — BRIEF OP NOTE
BRIEF OPERATIVE NOTE    Date of Procedure: 1/17/2017   Preoperative Diagnosis: Crush fracture of vertebra due to osteoporosis with delayed healing [M80.08XG]  Postoperative Diagnosis: Crush fracture of vertebra due to osteoporosis with delayed healing [M80.08XG]    Procedure(s):  KYPHOPLASTY L1    Surgeon(s) and Role:     * Marylen Clunes, MD - Primary            Surgical Staff:  Circ-1: Kriss Thompson RN  Circ-Relief: Rosalio Francisco RN  Scrub Tech-1: Barby Forte  Event Time In   Incision Start 1514   Incision Close 1540     Anesthesia: General   Estimated Blood Loss: minimal  Specimens:   ID Type Source Tests Collected by Time Destination   1 : bone biopsy L1 Preservative Bone  Marylen Clunes, MD 1/17/2017 1518 Pathology      Findings: fracture   Complications: none  Implants:   Implant Name Type Inv.  Item Serial No.  Lot No. LRB No. Used Action   CEMENT Trisha Lager -- NO CHARGE - [id number]   CEMENT XPEDE CX01A -- NO CHARGE   KYPHON [id number] N/A 1 Implanted

## 2017-01-17 NOTE — H&P
Allergies: NKDA  Medications: AmLODIPine Besylate (2.5 MG); Betamethasone Valerate (0.1 %); Fluconazole (100 MG); Hydrocodone-Acetaminophen (5-325 MG); Omeprazole (40 MG)    CC: BACK PAIN    HX: He is a 77-YO gentleman who had sustained an L1 fracture about a year and 4-5 months ago after lifting his wifes wheelchair. We had an MRI scan last January and it was 4 months out from injury. Even though the fracture had compressed to wedge shaped it should have been healed, but there was still signal change in the bone. He was caring for his wife and did not want to do any surgery. His wife has passed away and he is still hurting. He is maybe a little better than he was, but still significantly limits his activities. Repeat x-rays showed no change from really the previous x-rays. L1 was still wedge shaped and he was having the same pain, so we thought we needed to get a new scan and see if this fracture had gone ahead and healed or not. He is scheduled to get a left knee replacement the 23rd, so if there is a procedure to be done, we would want it beforehand. I reviewed the new MRI scan and interestingly the L1 fracture has not healed. There is still signal change on the Stir and T1 images. I think this accounts for his ongoing pain. I discussed it with him. I told him I think we can do a kyphoplasty and make him better. I think the risks of complications are less than the potential upside of getting rid of his back pain with a successful kyphoplasty. He would like to proceed. We discussed the risks  including risk of death, paralysis, heart attack, stroke, infection, bleeding, transfusion, clot in leg or lung, dural tear, migration of cement onto vital structures, perioperative adjacent fractures and the fact I cannot guarantee pain relief. He understands.  I explained the procedure in detail how we use x-ray machines and stick needles into the bone and then insert an inflatable tamp and elevate the bone and fill it with bone cement. I told them it is an outpatient procedure and patients go home the same day. EXAM: He is a healthy appearing gentleman with no gross deformity. He is A & O x3. HEENT: Pupils are very constricted, but still reactive to light. Oropharynx is clear. Neck is without adenopathy or bruits. Lungs are clear to auscultation bilaterally. Heart is RRR without murmur. Abdomen is soft and nontender without any hepatosplenomegaly. He is not on blood thinners. PLAN: L1 kyphoplasty as soon as well can before the 23rd before he has his TKA.       Electronically Signed By Simone MCKOY/nena

## 2017-01-17 NOTE — ANESTHESIA PREPROCEDURE EVALUATION
Anesthetic History               Review of Systems / Medical History  Patient summary reviewed, nursing notes reviewed and pertinent labs reviewed    Pulmonary                   Neuro/Psych              Cardiovascular    Hypertension              Exercise tolerance: >4 METS     GI/Hepatic/Renal     GERD: well controlled           Endo/Other    Diabetes: well controlled, type 2         Other Findings            Physical Exam    Airway  Mallampati: I  TM Distance: > 6 cm  Neck ROM: normal range of motion   Mouth opening: Normal     Cardiovascular  Regular rate and rhythm,  S1 and S2 normal,  no murmur, click, rub, or gallop             Dental    Dentition: Full upper dentures and Lower partial plate     Pulmonary  Breath sounds clear to auscultation               Abdominal  GI exam deferred       Other Findings            Anesthetic Plan    ASA: 3  Anesthesia type: general            Anesthetic plan and risks discussed with: Patient and Son / Daughter    Female friend present

## 2017-01-17 NOTE — IP AVS SNAPSHOT
Rhoda Marti 
 
 
 [address] [facility] [address] 
[phone] Patient: Maryam Thao MRN: [id number] SDM:[birth year] You are allergic to the following Allergen Reactions Prednisone Unknown (comments) Recent Documentation Height Weight BMI Smoking Status 1.778 m 76.2 kg 24.11 kg/m2 Never Smoker Emergency Contacts Name Discharge Info Relation Home Work Mobile Vane May  Daughter [21] [phone] About your hospitalization You were admitted on:  January 17, 2017 [facility] on:  January 17, 2017 Unit phone number:  [phone] Why you were hospitalized Your primary diagnosis was:  Vertebral Fracture, Osteoporotic (Hcc) Providers Seen During Your Hospitalizations Provider Role Specialty Primary office phone Silverio Chavez MD Attending Provider Orthopedic Surgery [phone] Your Primary Care Physician (PCP) Primary Care Physician Office Phone Office Fax Saba Matute [address] [place] [address] Follow-up Information None Current Discharge Medication List  
  
START taking these medications Dose & Instructions Dispensing Information Comments Morning Noon Evening Bedtime HYDROcodone-acetaminophen 5-325 mg per tablet Commonly known as:  Thelbert Deemston Your next dose is: Today, Tomorrow Other:  _________ Dose:  1-2 Tab Take 1-2 Tabs by mouth every six (6) hours as needed for Pain. Max Daily Amount: 8 Tabs. Quantity:  50 Tab Refills:  0 CONTINUE these medications which have NOT CHANGED Dose & Instructions Dispensing Information Comments Morning Noon Evening Bedtime  
 amLODIPine 2.5 mg tablet Commonly known as:  Helene Fraction Your next dose is: Today, Tomorrow Other:  _________ Refills:  0  
     
   
   
   
  
 metFORMIN 500 mg tablet Commonly known as:  GLUCOPHAGE Your next dose is: Today, Tomorrow Other:  _________ Refills:  0  
     
   
   
   
  
 omeprazole 40 mg capsule Commonly known as:  PRILOSEC Your next dose is: Today, Tomorrow Other:  _________ Refills:  0  
     
   
   
   
  
 sucralfate 1 gram tablet Commonly known as:  [place] Your next dose is: Today, Tomorrow Other:  _________ Dose:  1 g Take 1 g by mouth four (4) times daily. Refills:  0  
     
   
   
   
  
 triamcinolone acetonide 0.1 % topical cream  
Commonly known as:  KENALOG Your next dose is: Today, Tomorrow Other:  _________ Refills:  0  
     
   
   
   
  
 zolpidem 5 mg tablet Commonly known as:  AMBIEN Your next dose is: Today, Tomorrow Other:  _________ Take  by mouth nightly as needed for Sleep. Refills:  0 Where to Get Your Medications Information on where to get these meds will be given to you by the nurse or doctor. ! Ask your nurse or doctor about these medications HYDROcodone-acetaminophen 5-325 mg per tablet Discharge Instructions KYPHOPLASTY DISCHARGE INSTRUCTIONS General Information: This procedure is done to help with the back pain that is associated with compression fractures in the spine. The kyphoplasty involves placing a balloon into the space of the vertebrae that is fractured, blowing up the balloon, therefore realigning the broken pieces of bone, and then injecting cement into the space to strengthen the vertebrae. The pain experienced from compression fractures is caused by the vertebrae not being stabilized. The cement stabilizes the bone, therefore reducing the pain. Home Care Instructions: You can resume your regular diet and medication regimen.  Do not drink alcohol, drive, or make any important legal decisions in the next 24 hours. Do not lift anything heavier than a gallon of milk, or do anything strenuous for the next 24 hours. You will notice a dressing on your lower back after your procedure. This dressing can be removed in 24 hours. Showering is acceptable in 24 hours, but you should refrain from tub baths or swimming for 5 days. Call If: 
   You should call your Physician if you have any bleeding other than a small spot on your bandage. Call if you have any signs of infection, fever, or increased pain at the site. Call if you should have new or worsening pain in your back, or if you lose control of your bladder or bowel. Any tingling or loss of feeling or movement in your legs should also be reported. Follow-Up Instructions: Please see your ordering doctor as he has requested. To Reach Us:  [employer] [phone] After general anesthesia or intravenous sedation, for 24 hours or while taking prescription Narcotics: · Limit your activities · Do not drive and operate hazardous machinery · Do not make important personal or business decisions · Do  not drink alcoholic beverages · If you have not urinated within 8 hours after discharge, please contact your surgeon on call. *  Please give a list of your current medications to your Primary Care Provider. *  Please update this list whenever your medications are discontinued, doses are 
    changed, or new medications (including over-the-counter products) are added. *  Please carry medication information at all times in case of emergency situations. These are general instructions for a healthy lifestyle: No smoking/ No tobacco products/ Avoid exposure to second hand smoke Surgeon General's Warning:  Quitting smoking now greatly reduces serious risk to your health. Obesity, smoking, and sedentary lifestyle greatly increases your risk for illness A healthy diet, regular physical exercise & weight monitoring are important for maintaining a healthy lifestyle You may be retaining fluid if you have a history of heart failure or if you experience any of the following symptoms:  Weight gain of 3 pounds or more overnight or 5 pounds in a week, increased swelling in our hands or feet or shortness of breath while lying flat in bed. Please call your doctor as soon as you notice any of these symptoms; do not wait until your next office visit. Recognize signs and symptoms of STROKE: 
 
F-face looks uneven A-arms unable to move or move unevenly S-speech slurred or non-existent T-time-call 911 as soon as signs and symptoms begin-DO NOT go Back to bed or wait to see if you get better-TIME IS BRAIN. Discharge Orders None Introducing [facility]! Maria M Molina introduces Flybits patient portal. Now you can access parts of your medical record, email your doctor's office, and request medication refills online. 1. In your internet browser, go to [email or web address] 2. Click on the First Time User? Click Here link in the Sign In box. You will see the New Member Sign Up page. 3. Enter your Flybits Access Code exactly as it appears below. You will not need to use this code after youve completed the sign-up process. If you do not sign up before the expiration date, you must request a new code. · Flybits Access Code: [email or web address] Expires: 2/16/2017 11:09 AM 
 
4. Enter the last four digits of your Social Security Number (xxxx) and Date of Birth (mm/dd/yyyy) as indicated and click Submit. You will be taken to the next sign-up page. 5. Create a Flybits ID. This will be your Flybits login ID and cannot be changed, so think of one that is secure and easy to remember. 6. Create a [employer] password. You can change your password at any time. 7. Enter your Password Reset Question and Answer. This can be used at a later time if you forget your password. 8. Enter your e-mail address. You will receive e-mail notification when new information is available in [address] 9. Click Sign Up. You can now view and download portions of your medical record. 10. Click the Download Summary menu link to download a portable copy of your medical information. If you have questions, please visit the Frequently Asked Questions section of the [employer] website. Remember, WinWeb is NOT to be used for urgent needs. For medical emergencies, dial 911. Now available from your iPhone and Android! General Information Please provide this summary of care documentation to your next provider. Patient Signature:  ____________________________________________________________ Date:  ____________________________________________________________  
  
Aloma Snellen Provider Signature:  ____________________________________________________________ Date:  ____________________________________________________________

## 2017-01-17 NOTE — OP NOTES
[address] [address]   OPERATIVE REPORT       Name:  Lizette Pérez   MR#:  [id number]   :  1940   Account #:  [de-identified]   Date of Adm:  2017       DATE OF PROCEDURE: 2017      PREOPERATIVE DIAGNOSIS: Osteoporotic compression fracture, L1.    POSTOPERATIVE DIAGNOSIS: Osteoporotic compression fracture, L1.    PROCEDURES: Bilateral L1 kyphoplasty with bone biopsy and   procedure was carried out using biplanar C-arm fluoroscopy   imaging. SURGEON: Jose Elias Kelley. Jenise Brice MD    ANESTHESIA: GETA. ESTIMATED BLOOD LOSS: Minimal.    FLUIDS: A liter of crystalloid. SPECIMENS: L1 bone biopsy sent to pathology. INDICATIONS: This is a 49-year-old gentleman who had fallen   several months ago, has persistent back pain. It has partially   improved but plateaued. He is scheduled for either a hip or knee   replacement next week and he would like to be at his maximum   potential to recover from the procedure and given the fact that   he still has persistent pain, he has a delayed union and so it   was recommended that we proceed on with a kyphoplasty. PROCEDURE: The patient was brought to the operating room and   after administration of anesthesia, IV antibiotics and placement   of monitoring lines, he was positioned prone on the Raymond   frame. His back was prepped with Betadine and sterilely draped. At this point, surgeon called a timeout and confirmed the   procedure to be performed, his allergy history and the fact that   he had received preoperative IV antibiotics. AP and lateral C-  arms were brought in. We identified the L1 level.  We lined the   pedicles up on the AP and lateral view, marked the lateral   aspect of the pedicle on the skin bilaterally, made a stab wound   about a fingerbreadth lateral to this and inserted our starting   trocars down to the lateral edge of the pedicle and checking   under AP and lateral C-arm views, we inserted the trocars down   through the pedicle, being careful not to breach the medial   cortex. We anchored these into the posterior aspect of the   vertebral body and then we inserted our bone tamps and started   to inflate but really could not get a very good inflation, so   the tamps were removed and the curette was inserted through both   the cannulas and we curetted the medial, superior and inferior   aspects of the vertebral body. This was removed. The tamps were   reinserted and we inflated the bone tamps and got reasonably   good filling of the vertebral body. During this time, the   methylmethacrylate was reconstituted and placed into the   insertion devices. The inflatable tamps were deflated and   removed. The insertion devices were inserted and we compressed   the methylmethacrylate out into the void created by the bone   tamps and got reasonably good filling from front to back, top to   bottom, side to side. It should be noted that prior to inserting   our bone tamps, we did take a bone biopsy from the left side and   we drilled towards the anterior cortex bilaterally. Once the   cement had hardened, we removed the bone fillers and checked for   tails, none were seen, so the trocar sleeves were removed. We   anesthetized the skin and subcutaneous tissue with 0.5% Marcaine   with epinephrine, a total of 30 mL. A final AP and lateral C-arm   x-ray were obtained. We then closed each skin incision with a   single stitch of 3-0 nylon. Dry sterile dressings were applied. The patient was rolled supine onto the recovery room bed, having   tolerated the procedure well.         MD LEELEE Bui / Toro Getting   D:  01/17/2017   15:58   T:  01/17/2017   17:18   Job #:  [id number]

## 2017-01-17 NOTE — ANESTHESIA POSTPROCEDURE EVALUATION
Post-Anesthesia Evaluation and Assessment    Patient: Bethany Flores MRN: [id number]  SSN: [id number]    YOB: 1940  Age: 76 y.o. Sex: male       Cardiovascular Function/Vital Signs  Visit Vitals    /74    Pulse 84    Temp 36.9 °C (98.5 °F)    Resp 30    Ht 5' 10\" (1.778 m)    Wt 76.2 kg (168 lb)    SpO2 98%    BMI 24.11 kg/m2       Patient is status post general anesthesia for Procedure(s):  KYPHOPLASTY L1  .    Nausea/Vomiting: None    Postoperative hydration reviewed and adequate. Pain:  Pain Scale 1: Numeric (0 - 10) (01/17/17 1554)  Pain Intensity 1: 0 (01/17/17 1554)   Managed    Neurological Status:   Neuro (WDL): Exceptions to WDL (01/17/17 1554)  Neuro  Neurologic State: Alert;Drowsy (01/17/17 1554)  LUE Motor Response: Purposeful (01/17/17 1554)  LLE Motor Response: Purposeful (01/17/17 1554)  RUE Motor Response: Purposeful (01/17/17 1554)  RLE Motor Response: Purposeful (01/17/17 1554)   At baseline    Mental Status and Level of Consciousness: Arousable    Pulmonary Status:   O2 Device: Room air (01/17/17 1606)   Adequate oxygenation and airway patent    Complications related to anesthesia: None    Post-anesthesia assessment completed.  No concerns    Signed By: Altagracia Irvin MD     January 17, 2017

## 2017-01-17 NOTE — PERIOP NOTES
I have reviewed discharge instructions with the patient and son. The patient and son verbalized understanding.

## 2017-02-22 ENCOUNTER — HOSPITAL ENCOUNTER (OUTPATIENT)
Dept: PHYSICAL THERAPY | Age: 77
Discharge: HOME | End: 2017-02-22
Payer: MEDICARE

## 2017-02-22 PROCEDURE — G8979 MOBILITY GOAL STATUS: HCPCS

## 2017-02-22 PROCEDURE — 97014 ELECTRIC STIMULATION THERAPY: CPT

## 2017-02-22 PROCEDURE — 97162 PT EVAL MOD COMPLEX 30 MIN: CPT

## 2017-02-22 PROCEDURE — G8978 MOBILITY CURRENT STATUS: HCPCS

## 2017-02-22 NOTE — PROGRESS NOTES
Ambulatory/Rehab Services H2 Model Falls Risk Assessment    Risk Factor Pts. ·   Confusion/Disorientation/Impulsivity  []    4 ·   Symptomatic Depression  []   2 ·   Altered Elimination  []   1 ·   Dizziness/Vertigo  []   1 ·   Gender (Male)  [x]   1 ·   Any administered antiepileptics (anticonvulsants):  []   2 ·   Any administered benzodiazepines:  []   1 ·   Visual Impairment (specify):  []   1 ·   Portable Oxygen Use  []   1 ·   Orthostatic ? BP  []   1 ·   History of Recent Falls (within 3 mos.)  []   5     Ability to Rise from Chair (choose one) Pts. ·   Ability to rise in a single movement  []   0 ·   Pushes up, successful in one attempt  [x]   1 ·   Multiple attempts, but successful  []   3 ·   Unable to rise without assistance  []   4   Total: (5 or greater = High Risk) 2     Falls Prevention Plan:   []                Physical Limitations to Exercise (specify):   []                Mobility Assistance Device (type):   []                Exercise/Equipment Adaptation (specify):    ©2010 [facility] [address] Patent #[id number].  Federal Law prohibits the replication, distribution or use without written permission from [facility] [employer]

## 2017-02-22 NOTE — PROGRESS NOTES
Mirza Bailey  : 1940 [id number] [facility],[address] @ [address]  [address] [place] [address]  [phone]   [phone]        OUTPATIENT PHYSICAL THERAPY:Initial Assessment 2017      ICD-10: Treatment Diagnosis: Difficulty in walking, not elsewhere classified (R26.2)  Pain in right knee (M25.561)  Precautions/Allergies:   Prednisone   Fall Risk Score: 2 (? 5 = High Risk)  MD Orders: Evaluate and Treat MEDICAL/REFERRING DIAGNOSIS:  Status post total right knee replacement [Z96.651]   DATE OF ONSET: 17  REFERRING PHYSICIAN: Toribio Landry MD  RETURN PHYSICIAN APPOINTMENT: 17     INITIAL ASSESSMENT:  Mr. Ivonne Alfredo is a 76year old white male seen for physical therapy per MD orders with complaint of right knee pain following R TKA. Pt evaluated for outpatient physical therapy today with the following deficits: right knee pain, decreased ROM/ strength R knee complex, decreased static, dynamic standing balance, antalgic gait. Pt would benefit from physical therapy to address the above deficits in order to return to increased independence with functional mobility with decreased pain. Pt would benefit from initially working in aquatic setting to off load R knee while addressing goals. As patient progresses, plan to transition to gravity challenging, land based exercises/ activities. Plan of care and goals reviewed with patient who verbalizes agreement. PROBLEM LIST (Impacting functional limitations):  1. Decreased Strength  2. Decreased ADL/Functional Activities  3. Decreased Ambulation Ability/Technique  4. Decreased Balance  5. Increased Pain  6. Decreased Flexibility/Joint Mobility  7. Edema/Girth INTERVENTIONS PLANNED:  1. Balance Exercise  2. Gait Training  3. Home Exercise Program (HEP)  4. Manual Therapy  5. Range of Motion (ROM)  6. Therapeutic Activites  7. Therapeutic Exercise/Strengthening  8. aquatics   9.  Modalities   TREATMENT PLAN:  Effective Dates: 02/22/17 TO 05/17/17. Frequency/Duration: 2- 3 times a week for 12 weeks    GOALS: (Goals have been discussed and agreed upon with patient.)  Short-Term Functional Goals: Time Frame: 2 weeks  Patient will demonstrate independence and compliance with home exercise program for management of symptoms. Pt will demonstrate increase in Lower Extremity Functional Scale by 3-4  points for improved functional mobility. Pt will demonstrate increased active range of motion for right knee  from 0 ° (extension) to 115 ° (flexion). Pt will tolerate 35 to 40 minutes of aquatic therapy with pain of 2-3 /10 following intervention. Discharge Goals: Time Frame: 6 weeks  Pt will report Lower Extremity Functional Scale score of 60/80 for improved function. Pt will demonstrate active range of motion of R knee from 0 ° (extension) to 125 °(flexion). Pt will ambulate independently >/= 1500 feet with normal gait pattern with even stance time/ step length in bilateral lower extremities for safe community entry for grocery shopping. Pt will demonstrate reciprocal gait up/ down 12 steps independently with use of unilateral handrail. Rehabilitation Potential For Stated Goals: Good    Regarding Yanet Hodge's therapy, I certify that the treatment plan above will be carried out by a therapist or under their direction. Thank you for this referral,  Yeimi Morales PT       Referring Physician Signature: Robbin Cee MD              Date                      HISTORY:   History of Present Injury/Illness (Reason for Referral):  Pt is 76year old white male seen for physical therapy following R TKA 01/23/17. Prior to that most recent surgery pt had L TKA 10/28/16 with rehab as well as L1 kyphoplasty 01/13/17. Pt reports minimal pain throughout the day, reporting that most pain noted with stiffness when he doesn't move. Pt enjoys yard work, gardening and performing at nursing homes.   Pt reports his goal is to get back to these activities. Past Medical History/Comorbidities:   Mr. Rudy Garcia  has a past medical history of Diabetes ([address] GERD (gastroesophageal reflux disease); and Hypertension. He also has no past medical history of Difficult intubation; Malignant hyperthermia due to anesthesia; Nausea & vomiting; or Pseudocholinesterase deficiency. Mr. Rudy Garcia  has a past surgical history that includes colonoscopy; endoscopy; and orthopaedic (Left). Social History/Living Environment:     Pt lives alone in one story home with 2 to 4 step to enter. Prior Level of Function/Work/Activity:  Pt enjoys gardening, yard work, singing/ music  Dominant Side:         RIGHT  Current Medications:    Current Outpatient Prescriptions:     HYDROcodone-acetaminophen (NORCO) 5-325 mg per tablet, Take 1-2 Tabs by mouth every six (6) hours as needed for Pain. Max Daily Amount: 8 Tabs., Disp: 50 Tab, Rfl: 0    amLODIPine (NORVASC) 2.5 mg tablet, , Disp: , Rfl:     metFORMIN (GLUCOPHAGE) 500 mg tablet, , Disp: , Rfl:     omeprazole (PRILOSEC) 40 mg capsule, , Disp: , Rfl:     triamcinolone acetonide (KENALOG) 0.1 % topical cream, , Disp: , Rfl:     zolpidem (AMBIEN) 5 mg tablet, Take  by mouth nightly as needed for Sleep., Disp: , Rfl:     sucralfate (CARAFATE) 1 gram tablet, Take 1 g by mouth four (4) times daily. , Disp: , Rfl:    Date Last Reviewed:  2/22/2017   # of Personal Factors/Comorbidities that affect the Plan of Care: 1-2: MODERATE COMPLEXITY   EXAMINATION:   Observation/Orthostatic Postural Assessment:          Pt with noted edema in R knee complex- soft and boggy feel. In standing pt with noted thoracic kyphosis/ forward head.     Palpation:          Palpable tenderness in medial R knee (along hamstring insertion region)  ROM:  BUE WFL    BLE WFL with knee ROM as follows:  R knee:  0 to 108 °  L knee 0 to 115 °                  Strength:     Date:  02/22/17 Date:   Date:     LE MMT Left Right Left Right Left Right Hip Flex (L1/L2) 4+/5 4/5       Hip Abd (glut med) 4/5 4/5       Hip Ext 4/5 4/5       Quad    ( L3/4) 4/5 2+/5**       Hamstring 4/5 2+/5**       Anterior Tib (L4/L5) WFL WFL       Gastroc (S1/2) WFL WFL       EHL (L5)                           ** indicates limited strength based on limited ROM  Special Tests: deferred  Neurological Screen:        Sensation: Intact for light touch  Functional Mobility:         Gait/Ambulation:  Pt amb. without A.D however decreased heel strike RLE and decreased R knee flexion in swing phase of gait pattern. Transfers:  Pt demonstrates sit to stand to sit from standard seat without use of UE. Bed Mobility:  I with bed mobility        Stairs:  Pt demonstrates step to gait pattern for up and down stairs relying heavily on handrail. Balance:          Single leg stance:  R) 2 sec; L) 7 sec   Body Structures Involved:  1. Bones  2. Joints  3. Muscles  4. Ligaments Body Functions Affected:  1. Sensory/Pain  2. Neuromusculoskeletal  3. Movement Related Activities and Participation Affected:  1. General Tasks and Demands  2. Mobility  3. Self Care  4. Domestic Life  5. Community, Social and [place]   # of elements that affect the Plan of Care: 3: MODERATE COMPLEXITY   CLINICAL PRESENTATION:   Presentation: Evolving clinical presentation with changing clinical characteristics: MODERATE COMPLEXITY   CLINICAL DECISION MAKING:   Outcome Measure: Tool Used: Lower Extremity Functional Scale (LEFS)  Score:  Initial: 40/80 Most Recent: X/80 (Date: -- )   Interpretation of Score: 20 questions each scored on a 5 point scale with 0 representing \"extreme difficulty or unable to perform\" and 4 representing \"no difficulty\". The lower the score, the greater the functional disability. 80/80 represents no disability. Minimal detectable change is 9 points. Score 80 79-63 62-48 47-32 31-16 15-1 0   Modifier CH CI CJ CK CL CM CN     ?  Mobility - Walking and Moving Around:     - CURRENT STATUS: CK - 40%-59% impaired, limited or restricted    - GOAL STATUS: CI - 1%-19% impaired, limited or restricted    - D/C STATUS:  ---------------To be determined---------------    Medical Necessity:   · Patient is expected to demonstrate progress in strength, range of motion and balance to increase independence with functional mobility safely. Reason for Services/Other Comments:  · Patient continues to require modification of therapeutic interventions to increase complexity of exercises. Use of outcome tool(s) and clinical judgement create a POC that gives a: Questionable prediction of patient's progress: MODERATE COMPLEXITY   TREATMENT:   (In addition to Assessment/Re-Assessment sessions the following treatments were rendered)      Therapeutic Exercise: (see flow sheet below for minutes):  Exercises per grid below to improve mobility, strength and balance. Required minimal visual and verbal cues to promote proper body alignment and promote proper body mechanics. Progressed resistance, range and repetitions as indicated. Aquatic Therapy (see flow sheet below for minutes): Aquatic treatment performed per flow grid for Decreased muscle strength, Decreased static/dynamic balance and reactive control, Decreased range of motion and Ease of movement. Cues provided for technique. Assistance by therapist provided for progression of activities. Patient has difficulty with R knee pain. Date: 02/22/17       Modalities: 12 min       IFC with ice  To R knee in long sitting                       Manual Therapy:                                Aquatic Exercise:        Gait F/B/S/M        Heel/Toe walk        4-way        PF/DF        Hamstring Curls        Hip Flexion with knee ext.   (rockette)        Squats          SLR march        Lunges        Hip circles        Hip horizontal abduction/adduction        Core:          Core:        Deep well bike        Deep well jumping david        Deep well scissors        Deep well:  traction                Hamstring Stretch        Step Lunge        Piriformis stretch        PF Stretch        Balance: Single leg Stance        Balance: Tandem                          Therapeutic Exercise: 5  min       Quad sets X15 BLE       Heel slides X15 BLE       LAQ X15 BLE at EOB                               F=forward  B=Backward  S=sidesteps  M=marches    H=hold    Manual: (see flow sheet above for minutes)   Modalities: (see flow sheet above for minutes) Following assessment pt tolerated IFC/ with ice to R knee for pain/ edema management. Intensity monitored throughout intervention with skin intact and WFL following modality. HEP: Pt instructed to continue HEP per home health intervention. Treatment/Session Assessment:  Initial assessment completed with pt doing very well post R TKA. Pt appears very motivated to participate in therapy to return to active lifestyle. Plan to initiated aquatics at next session to address goals. · Pain/ Symptoms: Initial:   2/10  Pt reports he has difficulty managing stairs. Post Session:  1/10 ·   Compliance with Program/Exercises: Will assess as treatment progresses. · Recommendations/Intent for next treatment session: \"Next visit will focus on advancements to more challenging activities\". Plan to initiate aquatics at next session.     Total Treatment Duration:  PT Patient Time In/Time Out  Time In: 0800  Time Out: 0855     Scooter Pichardo, PT

## 2017-02-23 ENCOUNTER — HOSPITAL ENCOUNTER (OUTPATIENT)
Dept: PHYSICAL THERAPY | Age: 77
Discharge: HOME | End: 2017-02-23
Payer: MEDICARE

## 2017-02-23 PROCEDURE — 97014 ELECTRIC STIMULATION THERAPY: CPT | Performed by: PHYSICAL THERAPIST

## 2017-02-23 PROCEDURE — 97140 MANUAL THERAPY 1/> REGIONS: CPT | Performed by: PHYSICAL THERAPIST

## 2017-02-23 PROCEDURE — 97113 AQUATIC THERAPY/EXERCISES: CPT | Performed by: PHYSICAL THERAPIST

## 2017-02-23 NOTE — PROGRESS NOTES
Milagro Pineda  : 1940 [id number] [facility],[address] @ [address]  [address] [place] [address].  [phone]   [phone]        OUTPATIENT PHYSICAL THERAPY:Daily Note 2017      ICD-10: Treatment Diagnosis: Difficulty in walking, not elsewhere classified (R26.2)  Pain in right knee (M25.561)  Precautions/Allergies:   Prednisone   Fall Risk Score: 2 (? 5 = High Risk)  MD Orders: Evaluate and Treat MEDICAL/REFERRING DIAGNOSIS:  Status post total right knee replacement [Z96.651]   DATE OF ONSET: 17  REFERRING PHYSICIAN: Indiana Ramos MD  RETURN PHYSICIAN APPOINTMENT: 17     INITIAL ASSESSMENT:  Mr. Luisa Lambert is a 76year old white male seen for physical therapy per MD orders with complaint of right knee pain following R TKA. Pt evaluated for outpatient physical therapy today with the following deficits: right knee pain, decreased ROM/ strength R knee complex, decreased static, dynamic standing balance, antalgic gait. Pt would benefit from physical therapy to address the above deficits in order to return to increased independence with functional mobility with decreased pain. Pt would benefit from initially working in aquatic setting to off load R knee while addressing goals. As patient progresses, plan to transition to gravity challenging, land based exercises/ activities. Plan of care and goals reviewed with patient who verbalizes agreement. PROBLEM LIST (Impacting functional limitations):  1. Decreased Strength  2. Decreased ADL/Functional Activities  3. Decreased Ambulation Ability/Technique  4. Decreased Balance  5. Increased Pain  6. Decreased Flexibility/Joint Mobility  7. Edema/Girth INTERVENTIONS PLANNED:  1. Balance Exercise  2. Gait Training  3. Home Exercise Program (HEP)  4. Manual Therapy  5. Range of Motion (ROM)  6. Therapeutic Activites  7. Therapeutic Exercise/Strengthening  8. aquatics   9.  Modalities   TREATMENT PLAN:  Effective Dates: 02/22/17 TO 05/17/17. Frequency/Duration: 2- 3 times a week for 12 weeks    GOALS: (Goals have been discussed and agreed upon with patient.)  Short-Term Functional Goals: Time Frame: 2 weeks  Patient will demonstrate independence and compliance with home exercise program for management of symptoms. Pt will demonstrate increase in Lower Extremity Functional Scale by 3-4  points for improved functional mobility. Pt will demonstrate increased active range of motion for right knee  from 0 ° (extension) to 115 ° (flexion). Pt will tolerate 35 to 40 minutes of aquatic therapy with pain of 2-3 /10 following intervention. Discharge Goals: Time Frame: 6 weeks  Pt will report Lower Extremity Functional Scale score of 60/80 for improved function. Pt will demonstrate active range of motion of R knee from 0 ° (extension) to 125 °(flexion). Pt will ambulate independently >/= 1500 feet with normal gait pattern with even stance time/ step length in bilateral lower extremities for safe community entry for grocery shopping. Pt will demonstrate reciprocal gait up/ down 12 steps independently with use of unilateral handrail. Rehabilitation Potential For Stated Goals: Good    Regarding Damaris Hodge's therapy, I certify that the treatment plan above will be carried out by a therapist or under their direction. Thank you for this referral,  Lashonda Valdez, PT                  HISTORY:   History of Present Injury/Illness (Reason for Referral):  Pt is 76year old white male seen for physical therapy following R TKA 01/23/17. Prior to that most recent surgery pt had L TKA 10/28/16 with rehab as well as L1 kyphoplasty 01/13/17. Pt reports minimal pain throughout the day, reporting that most pain noted with stiffness when he doesn't move. Pt enjoys yard work, gardening and performing at nursing homes. Pt reports his goal is to get back to these activities.     Past Medical History/Comorbidities:   Mr. Annel Castro  has a past medical history of Diabetes ([facility] [address] GERD (gastroesophageal reflux disease); and Hypertension. He also has no past medical history of Difficult intubation; Malignant hyperthermia due to anesthesia; Nausea & vomiting; or Pseudocholinesterase deficiency. Mr. Laci Benito  has a past surgical history that includes colonoscopy; endoscopy; and orthopaedic (Left). Social History/Living Environment:     Pt lives alone in one story home with 2 to 4 step to enter. Prior Level of Function/Work/Activity:  Pt enjoys gardening, yard work, singing/ music  Dominant Side:         RIGHT  Current Medications:    Current Outpatient Prescriptions:     HYDROcodone-acetaminophen (NORCO) 5-325 mg per tablet, Take 1-2 Tabs by mouth every six (6) hours as needed for Pain. Max Daily Amount: 8 Tabs., Disp: 50 Tab, Rfl: 0    amLODIPine (NORVASC) 2.5 mg tablet, , Disp: , Rfl:     metFORMIN (GLUCOPHAGE) 500 mg tablet, , Disp: , Rfl:     omeprazole (PRILOSEC) 40 mg capsule, , Disp: , Rfl:     triamcinolone acetonide (KENALOG) 0.1 % topical cream, , Disp: , Rfl:     zolpidem (AMBIEN) 5 mg tablet, Take  by mouth nightly as needed for Sleep., Disp: , Rfl:     sucralfate (CARAFATE) 1 gram tablet, Take 1 g by mouth four (4) times daily. , Disp: , Rfl:    Date Last Reviewed:  2/23/2017 pt brought list in chart--see paper copy in chart   EXAMINATION:   Observation/Orthostatic Postural Assessment:          Pt with noted edema in R knee complex- soft and boggy feel. In standing pt with noted thoracic kyphosis/ forward head.     Palpation:          Palpable tenderness in medial R knee (along hamstring insertion region)  ROM:  BUE WFL    BLE WFL with knee ROM as follows:  R knee:  0 to 108 °  L knee 0 to 115 °                  Strength:     Date:  02/22/17 Date:   Date:     LE MMT Left Right Left Right Left Right   Hip Flex (L1/L2) 4+/5 4/5       Hip Abd (glut med) 4/5 4/5       Hip Ext 4/5 4/5       Quad    ( L3/4) 4/5 2+/5**       Hamstring 4/5 2+/5** Anterior Tib (L4/L5) WFL WFL       Gastroc (S1/2) WFL WFL       EHL (L5)                           ** indicates limited strength based on limited ROM  Special Tests: deferred  Neurological Screen:        Sensation: Intact for light touch  Functional Mobility:         Gait/Ambulation:  Pt amb. without A.D however decreased heel strike RLE and decreased R knee flexion in swing phase of gait pattern. Transfers:  Pt demonstrates sit to stand to sit from standard seat without use of UE. Bed Mobility:  I with bed mobility        Stairs:  Pt demonstrates step to gait pattern for up and down stairs relying heavily on handrail. Balance:          Single leg stance:  R) 2 sec; L) 7 sec   Body Structures Involved:  1. Bones  2. Joints  3. Muscles  4. Ligaments Body Functions Affected:  1. Sensory/Pain  2. Neuromusculoskeletal  3. Movement Related Activities and Participation Affected:  1. General Tasks and Demands  2. Mobility  3. Self Care  4. Domestic Life  5. Community, Social and Civic Life   CLINICAL DECISION MAKING:   Outcome Measure: Tool Used: Lower Extremity Functional Scale (LEFS)  Score:  Initial: 40/80 Most Recent: X/80 (Date: -- )   Interpretation of Score: 20 questions each scored on a 5 point scale with 0 representing \"extreme difficulty or unable to perform\" and 4 representing \"no difficulty\". The lower the score, the greater the functional disability. 80/80 represents no disability. Minimal detectable change is 9 points. Score 80 79-63 62-48 47-32 31-16 15-1 0   Modifier CH CI CJ CK CL CM CN     ?  Mobility - Walking and Moving Around:     - CURRENT STATUS: CK - 40%-59% impaired, limited or restricted    - GOAL STATUS: CI - 1%-19% impaired, limited or restricted    - D/C STATUS:  ---------------To be determined---------------    Medical Necessity:   · Patient is expected to demonstrate progress in strength, range of motion and balance to increase independence with functional mobility safely. Reason for Services/Other Comments:  · Patient continues to require modification of therapeutic interventions to increase complexity of exercises. TREATMENT:   (In addition to Assessment/Re-Assessment sessions the following treatments were rendered)      Therapeutic Exercise: (see flow sheet below for minutes):  Exercises per grid below to improve mobility, strength and balance. Required minimal visual and verbal cues to promote proper body alignment and promote proper body mechanics. Progressed resistance, range and repetitions as indicated. Aquatic Therapy (see flow sheet below for minutes): Aquatic treatment performed per flow grid for Decreased muscle strength, Decreased static/dynamic balance and reactive control, Decreased range of motion and Ease of movement. Cues provided for technique. Assistance by therapist provided for progression of activities. Patient has difficulty with R knee pain. Date: 02/22/17 2/23/17      Modalities: 12 min 12 min      IFC with ice  To R knee in long sitting To R knee long sitting                      Manual Therapy:  12 minutes      Soft tissue mobilization distal quad/hamstrings, ITB, proximal gastroc  5 min      Scar mobilization  5 min      Patella mobilizations  2x20 each direction      Aquatic Exercise:  1.5#/40 min      Gait F/B/S/M  x4 Laps forward, backward  x2 L marches and side steps      Heel/Toe   x20 raises      4-way  x15 BLE      PF/DF  x20      Hamstring Curls  x15 BLE      Hip Flexion with knee ext.   (rockette)        Squats    x20      SLR march        Lunges        Hip circles        Hip horizontal abduction/adduction        Core:          Core:        Deep well bike        Deep well jumping david        Deep well scissors        Deep well:  traction                Hamstring Stretch  3 H 30      Step Lunge  x10 H 3-5 R knee      Piriformis stretch        PF Stretch  3 H 30 off edge of steps      Balance: Single leg Stance        Balance: Tandem                          Therapeutic Exercise: 5  min       Quad sets X15 BLE       Heel slides X15 BLE       LAQ X15 BLE at EOB                               F=forward  B=Backward  S=sidesteps  M=marches    H=hold    Manual: (see flow sheet above for minutes) scar mobilizations and Soft Tissue Mobilization to increase mobility with R knee  Modalities: (see flow sheet above for minutes) Following assessment pt tolerated IFC/ with ice to R knee for pain/ edema management. Intensity monitored throughout intervention with skin intact and WFL following modality. HEP: Pt instructed to continue HEP per home health intervention. Treatment/Session Assessment:  Incision closed but decreased scar mobility. Initiated scar mobilizations and STM to increase mobility. Began aquatic therapy per flow today. Pt exhibited increased ROM after manual therapy and flexion AAROM R knee increased to 113 ° noting increase of 5 ° since yesterday. Pt did have soreness after the aquatics and still has significant edema R knee. Performed e-stim and ice afterwards and less c/o upon leaving. · Pain/ Symptoms: Initial:   2/10 R knee  Pt reports \"it's a little sore. \" Post Session:  1/10 \"sore\" R knee ·   Compliance with Program/Exercises: Will assess as treatment progresses. · Recommendations/Intent for next treatment session: \"Next visit will focus on advancements to more challenging activities\". Continue aquatics at next session.     Total Treatment Duration: increased time required for changing prior to modalities  PT Patient Time In/Time Out  Time In: 1330  Time Out: 1455     Lashonda Valdez, PT

## 2017-02-27 ENCOUNTER — HOSPITAL ENCOUNTER (OUTPATIENT)
Dept: PHYSICAL THERAPY | Age: 77
Discharge: HOME | End: 2017-02-27
Payer: MEDICARE

## 2017-02-27 PROCEDURE — 97113 AQUATIC THERAPY/EXERCISES: CPT

## 2017-02-27 NOTE — PROGRESS NOTES
Suellen Cherry  : 1940 [address] [facility] @ [address]  [address] [place] [address].  [phone]   [phone]        OUTPATIENT PHYSICAL THERAPY:Daily Note 2017      ICD-10: Treatment Diagnosis: Difficulty in walking, not elsewhere classified (R26.2)  Pain in right knee (M25.561)  Precautions/Allergies:   Prednisone   Fall Risk Score: 2 (? 5 = High Risk)  MD Orders: Evaluate and Treat MEDICAL/REFERRING DIAGNOSIS:  Status post total right knee replacement [Z96.651]   DATE OF ONSET: 17  REFERRING PHYSICIAN: Daniel Coelho MD  RETURN PHYSICIAN APPOINTMENT: 17     INITIAL ASSESSMENT:  Mr. Elvia Munguia is a 76year old white male seen for physical therapy per MD orders with complaint of right knee pain following R TKA. Pt evaluated for outpatient physical therapy today with the following deficits: right knee pain, decreased ROM/ strength R knee complex, decreased static, dynamic standing balance, antalgic gait. Pt would benefit from physical therapy to address the above deficits in order to return to increased independence with functional mobility with decreased pain. Pt would benefit from initially working in aquatic setting to off load R knee while addressing goals. As patient progresses, plan to transition to gravity challenging, land based exercises/ activities. Plan of care and goals reviewed with patient who verbalizes agreement. PROBLEM LIST (Impacting functional limitations):  1. Decreased Strength  2. Decreased ADL/Functional Activities  3. Decreased Ambulation Ability/Technique  4. Decreased Balance  5. Increased Pain  6. Decreased Flexibility/Joint Mobility  7. Edema/Girth INTERVENTIONS PLANNED:  1. Balance Exercise  2. Gait Training  3. Home Exercise Program (HEP)  4. Manual Therapy  5. Range of Motion (ROM)  6. Therapeutic Activites  7. Therapeutic Exercise/Strengthening  8. aquatics   9.  Modalities   TREATMENT PLAN:  Effective Dates: 02/22/17 TO 05/17/17. Frequency/Duration: 2- 3 times a week for 12 weeks    GOALS: (Goals have been discussed and agreed upon with patient.)  Short-Term Functional Goals: Time Frame: 2 weeks  Patient will demonstrate independence and compliance with home exercise program for management of symptoms. Pt will demonstrate increase in Lower Extremity Functional Scale by 3-4  points for improved functional mobility. Pt will demonstrate increased active range of motion for right knee  from 0 ° (extension) to 115 ° (flexion). Pt will tolerate 35 to 40 minutes of aquatic therapy with pain of 2-3 /10 following intervention. Discharge Goals: Time Frame: 6 weeks  Pt will report Lower Extremity Functional Scale score of 60/80 for improved function. Pt will demonstrate active range of motion of R knee from 0 ° (extension) to 125 °(flexion). Pt will ambulate independently >/= 1500 feet with normal gait pattern with even stance time/ step length in bilateral lower extremities for safe community entry for grocery shopping. Pt will demonstrate reciprocal gait up/ down 12 steps independently with use of unilateral handrail. Rehabilitation Potential For Stated Goals: Good    Regarding Braulio Hodge's therapy, I certify that the treatment plan above will be carried out by a therapist or under their direction. Thank you for this referral,  Geoff Ramirez PT                  HISTORY:   History of Present Injury/Illness (Reason for Referral):  Pt is 76year old white male seen for physical therapy following R TKA 01/23/17. Prior to that most recent surgery pt had L TKA 10/28/16 with rehab as well as L1 kyphoplasty 01/13/17. Pt reports minimal pain throughout the day, reporting that most pain noted with stiffness when he doesn't move. Pt enjoys yard work, gardening and performing at nursing homes. Pt reports his goal is to get back to these activities.     Past Medical History/Comorbidities:   Mr. Manuel Welch  has a past medical history of Diabetes ([facility] [address] GERD (gastroesophageal reflux disease); and Hypertension. He also has no past medical history of Difficult intubation; Malignant hyperthermia due to anesthesia; Nausea & vomiting; or Pseudocholinesterase deficiency. Mr. Thomas Rowell  has a past surgical history that includes colonoscopy; endoscopy; and orthopaedic (Left). Social History/Living Environment:     Pt lives alone in one story home with 2 to 4 step to enter. Prior Level of Function/Work/Activity:  Pt enjoys gardening, yard work, singing/ music  Dominant Side:         RIGHT  Current Medications:    Current Outpatient Prescriptions:     HYDROcodone-acetaminophen (NORCO) 5-325 mg per tablet, Take 1-2 Tabs by mouth every six (6) hours as needed for Pain. Max Daily Amount: 8 Tabs., Disp: 50 Tab, Rfl: 0    amLODIPine (NORVASC) 2.5 mg tablet, , Disp: , Rfl:     metFORMIN (GLUCOPHAGE) 500 mg tablet, , Disp: , Rfl:     omeprazole (PRILOSEC) 40 mg capsule, , Disp: , Rfl:     triamcinolone acetonide (KENALOG) 0.1 % topical cream, , Disp: , Rfl:     zolpidem (AMBIEN) 5 mg tablet, Take  by mouth nightly as needed for Sleep., Disp: , Rfl:     sucralfate (CARAFATE) 1 gram tablet, Take 1 g by mouth four (4) times daily. , Disp: , Rfl:    Date Last Reviewed:  2/27/2017 pt brought list in chart--see paper copy in chart   EXAMINATION:   Observation/Orthostatic Postural Assessment:          Pt with noted edema in R knee complex- soft and boggy feel. In standing pt with noted thoracic kyphosis/ forward head.     Palpation:          Palpable tenderness in medial R knee (along hamstring insertion region)  ROM:  BUE WFL    BLE WFL with knee ROM as follows:  R knee:  0 to 108 °  L knee 0 to 115 °                  Strength:     Date:  02/22/17 Date:   Date:     LE MMT Left Right Left Right Left Right   Hip Flex (L1/L2) 4+/5 4/5       Hip Abd (glut med) 4/5 4/5       Hip Ext 4/5 4/5       Quad    ( L3/4) 4/5 2+/5**       Hamstring 4/5 2+/5**       Anterior Tib (L4/L5) WFL WFL       Gastroc (S1/2) WFL WFL       EHL (L5)                           ** indicates limited strength based on limited ROM  Special Tests: deferred  Neurological Screen:        Sensation: Intact for light touch  Functional Mobility:         Gait/Ambulation:  Pt amb. without A.D however decreased heel strike RLE and decreased R knee flexion in swing phase of gait pattern. Transfers:  Pt demonstrates sit to stand to sit from standard seat without use of UE. Bed Mobility:  I with bed mobility        Stairs:  Pt demonstrates step to gait pattern for up and down stairs relying heavily on handrail. Balance:          Single leg stance:  R) 2 sec; L) 7 sec   Body Structures Involved:  1. Bones  2. Joints  3. Muscles  4. Ligaments Body Functions Affected:  1. Sensory/Pain  2. Neuromusculoskeletal  3. Movement Related Activities and Participation Affected:  1. General Tasks and Demands  2. Mobility  3. Self Care  4. Domestic Life  5. Community, Social and Civic Life   CLINICAL DECISION MAKING:   Outcome Measure: Tool Used: Lower Extremity Functional Scale (LEFS)  Score:  Initial: 40/80 Most Recent: X/80 (Date: -- )   Interpretation of Score: 20 questions each scored on a 5 point scale with 0 representing \"extreme difficulty or unable to perform\" and 4 representing \"no difficulty\". The lower the score, the greater the functional disability. 80/80 represents no disability. Minimal detectable change is 9 points. Score 80 79-63 62-48 47-32 31-16 15-1 0   Modifier CH CI CJ CK CL CM CN     ?  Mobility - Walking and Moving Around:     - CURRENT STATUS: CK - 40%-59% impaired, limited or restricted    - GOAL STATUS: CI - 1%-19% impaired, limited or restricted    - D/C STATUS:  ---------------To be determined---------------    Medical Necessity:   · Patient is expected to demonstrate progress in strength, range of motion and balance to increase independence with functional mobility safely. Reason for Services/Other Comments:  · Patient continues to require modification of therapeutic interventions to increase complexity of exercises. TREATMENT:   (In addition to Assessment/Re-Assessment sessions the following treatments were rendered)      Therapeutic Exercise: (see flow sheet below for minutes):  Exercises per grid below to improve mobility, strength and balance. Required minimal visual and verbal cues to promote proper body alignment and promote proper body mechanics. Progressed resistance, range and repetitions as indicated. Aquatic Therapy (see flow sheet below for minutes): Aquatic treatment performed per flow grid for Decreased muscle strength, Decreased static/dynamic balance and reactive control, Decreased range of motion and Ease of movement. Cues provided for technique. Assistance by therapist provided for progression of activities. Patient has difficulty with R knee pain. Date: 02/22/17 2/23/17 02/27/17     Modalities: 12 min 12 min      IFC with ice  To R knee in long sitting To R knee long sitting                      Manual Therapy:  12 minutes      Soft tissue mobilization distal quad/hamstrings, ITB, proximal gastroc  5 min      Scar mobilization  5 min      Patella mobilizations  2x20 each direction      Aquatic Exercise:  1.5#/  40 min  60 mins  2.5#     Gait F/B/S/M  x4 Laps forward, backward  x2 L marches and side steps x4L ea     Heel/Toe   x20 raises x30     4-way  x15 BLE x20 B     PF/DF  x20      Hamstring Curls  x15 BLE x2L     Hip Flexion with knee ext.   (rockette)   x20     Squats    x20 x30     SLR march        Lunges        Hip circles   Fig 4 x10 H5     Hip horizontal abduction/adduction        Core:          Core:        Deep well bike   7 mins     Deep well jumping david   2min     Deep well scissors   2min     Deep well:  traction   6 min             Hamstring Stretch  3 H 30 2xH30 B     Step Lunge  x10 H 3-5 R knee x10H5 B     Piriformis stretch        PF Stretch  3 H 30 off edge of steps      Balance: Single leg Stance        Balance: Tandem                          Therapeutic Exercise: 5  min       Quad sets X15 BLE       Heel slides X15 BLE       LAQ X15 BLE at EOB                               F=forward  B=Backward  S=sidesteps  M=marches    H=hold    Manual: (see flow sheet above for minutes) scar mobilizations and Soft Tissue Mobilization to increase mobility with R knee  Modalities: (see flow sheet above for minutes) Following assessment pt tolerated IFC/ with ice to R knee for pain/ edema management. Intensity monitored throughout intervention with skin intact and WFL following modality. HEP: Pt instructed to continue HEP per home health intervention. Treatment/Session Assessment: Pt able to complete all aquatic activities without painful episodes. Some fatigue noted with increased repetitions. · Pain/ Symptoms: Initial:   2/10 R knee  Pt reports mild soreness with R knee today. Primary complaint of difficulty amb up/down stairs with reciprocating pattern. Post Session:  1/10 \"sore\" R knee ·     · Compliance with Program/Exercises: Will assess as treatment progresses. · Recommendations/Intent for next treatment session: \"Next visit will focus on advancements to more challenging activities\". Continue aquatics at next session.       Total Treatment Duration:   PT Patient Time In/Time Out  Time In: 1500  Time Out: 188 Prashanth Colón, PTA

## 2017-03-01 ENCOUNTER — HOSPITAL ENCOUNTER (OUTPATIENT)
Dept: PHYSICAL THERAPY | Age: 77
Discharge: HOME | End: 2017-03-01
Payer: MEDICARE

## 2017-03-01 PROCEDURE — 97113 AQUATIC THERAPY/EXERCISES: CPT

## 2017-03-01 NOTE — PROGRESS NOTES
Tari Lu  : 1940 [address] @ [address] [place] [address].  [phone]   [phone]        OUTPATIENT PHYSICAL THERAPY:Daily Note 3/1/2017      ICD-10: Treatment Diagnosis: Difficulty in walking, not elsewhere classified (R26.2)  Pain in right knee (M25.561)  Precautions/Allergies:   Prednisone   Fall Risk Score: 2 (? 5 = High Risk)  MD Orders: Evaluate and Treat MEDICAL/REFERRING DIAGNOSIS:  Status post total right knee replacement [Z96.651]   DATE OF ONSET: 17  REFERRING PHYSICIAN: Mark Stephen MD  RETURN PHYSICIAN APPOINTMENT: 17     INITIAL ASSESSMENT:  Mr. Manju Monteiro is a 76year old white male seen for physical therapy per MD orders with complaint of right knee pain following R TKA. Pt evaluated for outpatient physical therapy today with the following deficits: right knee pain, decreased ROM/ strength R knee complex, decreased static, dynamic standing balance, antalgic gait. Pt would benefit from physical therapy to address the above deficits in order to return to increased independence with functional mobility with decreased pain. Pt would benefit from initially working in aquatic setting to off load R knee while addressing goals. As patient progresses, plan to transition to gravity challenging, land based exercises/ activities. Plan of care and goals reviewed with patient who verbalizes agreement. PROBLEM LIST (Impacting functional limitations):  1. Decreased Strength  2. Decreased ADL/Functional Activities  3. Decreased Ambulation Ability/Technique  4. Decreased Balance  5. Increased Pain  6. Decreased Flexibility/Joint Mobility  7. Edema/Girth INTERVENTIONS PLANNED:  1. Balance Exercise  2. Gait Training  3. Home Exercise Program (HEP)  4. Manual Therapy  5. Range of Motion (ROM)  6. Therapeutic Activites  7. Therapeutic Exercise/Strengthening  8. aquatics   9.  Modalities   TREATMENT PLAN:  Effective Dates: 02/22/17 TO 05/17/17. Frequency/Duration: 2- 3 times a week for 12 weeks    GOALS: (Goals have been discussed and agreed upon with patient.)  Short-Term Functional Goals: Time Frame: 2 weeks  Patient will demonstrate independence and compliance with home exercise program for management of symptoms. Pt will demonstrate increase in Lower Extremity Functional Scale by 3-4  points for improved functional mobility. Pt will demonstrate increased active range of motion for right knee  from 0 ° (extension) to 115 ° (flexion). Pt will tolerate 35 to 40 minutes of aquatic therapy with pain of 2-3 /10 following intervention. Discharge Goals: Time Frame: 6 weeks  Pt will report Lower Extremity Functional Scale score of 60/80 for improved function. Pt will demonstrate active range of motion of R knee from 0 ° (extension) to 125 °(flexion). Pt will ambulate independently >/= 1500 feet with normal gait pattern with even stance time/ step length in bilateral lower extremities for safe community entry for grocery shopping. Pt will demonstrate reciprocal gait up/ down 12 steps independently with use of unilateral handrail. Rehabilitation Potential For Stated Goals: Good    Regarding Rudi Kawasaki Catron's therapy, I certify that the treatment plan above will be carried out by a therapist or under their direction. Thank you for this referral,  Amos Ortiz, PT                  HISTORY:   History of Present Injury/Illness (Reason for Referral):  Pt is 76year old white male seen for physical therapy following R TKA 01/23/17. Prior to that most recent surgery pt had L TKA 10/28/16 with rehab as well as L1 kyphoplasty 01/13/17. Pt reports minimal pain throughout the day, reporting that most pain noted with stiffness when he doesn't move. Pt enjoys yard work, gardening and performing at nursing homes. Pt reports his goal is to get back to these activities.     Past Medical History/Comorbidities:   Mr. Otilia Jefferson  has a past medical history of Diabetes ([facility] [address] GERD (gastroesophageal reflux disease); and Hypertension. He also has no past medical history of Difficult intubation; Malignant hyperthermia due to anesthesia; Nausea & vomiting; or Pseudocholinesterase deficiency. Mr. Marley Zacarias  has a past surgical history that includes colonoscopy; endoscopy; and orthopaedic (Left). Social History/Living Environment:     Pt lives alone in one story home with 2 to 4 step to enter. Prior Level of Function/Work/Activity:  Pt enjoys gardening, yard work, singing/ music  Dominant Side:         RIGHT  Current Medications:    Current Outpatient Prescriptions:     HYDROcodone-acetaminophen (NORCO) 5-325 mg per tablet, Take 1-2 Tabs by mouth every six (6) hours as needed for Pain. Max Daily Amount: 8 Tabs., Disp: 50 Tab, Rfl: 0    amLODIPine (NORVASC) 2.5 mg tablet, , Disp: , Rfl:     metFORMIN (GLUCOPHAGE) 500 mg tablet, , Disp: , Rfl:     omeprazole (PRILOSEC) 40 mg capsule, , Disp: , Rfl:     triamcinolone acetonide (KENALOG) 0.1 % topical cream, , Disp: , Rfl:     zolpidem (AMBIEN) 5 mg tablet, Take  by mouth nightly as needed for Sleep., Disp: , Rfl:     sucralfate (CARAFATE) 1 gram tablet, Take 1 g by mouth four (4) times daily. , Disp: , Rfl:    Date Last Reviewed:  3/1/2017 pt brought list in chart--see paper copy in chart   EXAMINATION:   Observation/Orthostatic Postural Assessment:          Pt with noted edema in R knee complex- soft and boggy feel. In standing pt with noted thoracic kyphosis/ forward head.     Palpation:          Palpable tenderness in medial R knee (along hamstring insertion region)  ROM:  BUE WFL    BLE WFL with knee ROM as follows:  R knee:  0 to 108 °  L knee 0 to 115 °                  Strength:     Date:  02/22/17 Date:   Date:     LE MMT Left Right Left Right Left Right   Hip Flex (L1/L2) 4+/5 4/5       Hip Abd (glut med) 4/5 4/5       Hip Ext 4/5 4/5       Quad    ( L3/4) 4/5 2+/5**       Hamstring 4/5 2+/5** Anterior Tib (L4/L5) WFL WFL       Gastroc (S1/2) WFL WFL       EHL (L5)                           ** indicates limited strength based on limited ROM  Special Tests: deferred  Neurological Screen:        Sensation: Intact for light touch  Functional Mobility:         Gait/Ambulation:  Pt amb. without A.D however decreased heel strike RLE and decreased R knee flexion in swing phase of gait pattern. Transfers:  Pt demonstrates sit to stand to sit from standard seat without use of UE. Bed Mobility:  I with bed mobility        Stairs:  Pt demonstrates step to gait pattern for up and down stairs relying heavily on handrail. Balance:          Single leg stance:  R) 2 sec; L) 7 sec   Body Structures Involved:  1. Bones  2. Joints  3. Muscles  4. Ligaments Body Functions Affected:  1. Sensory/Pain  2. Neuromusculoskeletal  3. Movement Related Activities and Participation Affected:  1. General Tasks and Demands  2. Mobility  3. Self Care  4. Domestic Life  5. Community, Social and Civic Life   CLINICAL DECISION MAKING:   Outcome Measure: Tool Used: Lower Extremity Functional Scale (LEFS)  Score:  Initial: 40/80 Most Recent: X/80 (Date: -- )   Interpretation of Score: 20 questions each scored on a 5 point scale with 0 representing \"extreme difficulty or unable to perform\" and 4 representing \"no difficulty\". The lower the score, the greater the functional disability. 80/80 represents no disability. Minimal detectable change is 9 points. Score 80 79-63 62-48 47-32 31-16 15-1 0   Modifier CH CI CJ CK CL CM CN     ?  Mobility - Walking and Moving Around:     - CURRENT STATUS: CK - 40%-59% impaired, limited or restricted    - GOAL STATUS: CI - 1%-19% impaired, limited or restricted    - D/C STATUS:  ---------------To be determined---------------    Medical Necessity:   · Patient is expected to demonstrate progress in strength, range of motion and balance to increase independence with functional mobility safely. Reason for Services/Other Comments:  · Patient continues to require modification of therapeutic interventions to increase complexity of exercises. TREATMENT:   (In addition to Assessment/Re-Assessment sessions the following treatments were rendered)      Therapeutic Exercise: (see flow sheet below for minutes):  Exercises per grid below to improve mobility, strength and balance. Required minimal visual and verbal cues to promote proper body alignment and promote proper body mechanics. Progressed resistance, range and repetitions as indicated. Aquatic Therapy (see flow sheet below for minutes): Aquatic treatment performed per flow grid for Decreased muscle strength, Decreased static/dynamic balance and reactive control, Decreased range of motion and Ease of movement. Cues provided for technique. Assistance by therapist provided for progression of activities. Patient has difficulty with R knee pain. Date: 02/22/17 2/23/17 02/27/17 03/01/17    Modalities: 12 min 12 min      IFC with ice  To R knee in long sitting To R knee long sitting                      Manual Therapy:  12 minutes      Soft tissue mobilization distal quad/hamstrings, ITB, proximal gastroc  5 min      Scar mobilization  5 min      Patella mobilizations  2x20 each direction      Aquatic Exercise:  1.5#/  40 min  60 mins  2.5# 60 min 2.5#    Gait F/B/S/M  x4 Laps forward, backward  x2 L marches and side steps x4L ea x4L each    Heel/Toe   x20 raises x30 x2L    4-way  x15 BLE x20 B x20 BLE    PF/DF  x20      Hamstring Curls  x15 BLE x2L x4L    Hip Flexion with knee ext.   (rockette)   x20 x20 BLE    Squats    x20 x30 x20 BLE    SLR march    x4L    Lunges    x20 BLE    Hip circles   Fig 4 x10 H5 x20 BLE    Hip horizontal abduction/adduction        Core:          Core:        Deep well bike   7 mins 3 min    Deep well jumping david   2min 2 min    Deep well scissors   2min 2 min    Deep well:  traction   6 min Hamstring Stretch  3 H 30 2xH30 B 2H30 BLE    Step Lunge  x10 H 3-5 R knee x10H5 B 10H5 BLE    Piriformis stretch        PF Stretch  3 H 30 off edge of steps      Balance: Single leg Stance    2H30 BLE    Balance: Tandem    2H30 BLE    Step ups    x10 BLE              Therapeutic Exercise: 5  min       Quad sets X15 BLE       Heel slides X15 BLE       LAQ X15 BLE at EOB                               F=forward  B=Backward  S=sidesteps  M=marches    H=hold    Manual: (see flow sheet above for minutes)   Modalities: (see flow sheet above for minutes) Pt declined today    HEP: Pt instructed to continue HEP per home health intervention. Treatment/Session Assessment: Pt able to complete all aquatic activities without painful episodes (see above for details) with addition of lunges and work at pool steps. Pt with difficulty and stiffness in LLE as much a complant as RLE. Noted some fatigue noted with increased repetitions. Plan to continue to advance exercises in pool setting at next visit. · Pain/ Symptoms: Initial:   0/10 R knee  Pt reports sometimes my L knee is more sore than my R knee  Post Session:  0/10  ·     · Compliance with Program/Exercises: Will assess as treatment progresses. · Recommendations/Intent for next treatment session: \"Next visit will focus on advancements to more challenging activities\". Continue aquatics at next session.       Total Treatment Duration:   PT Patient Time In/Time Out  Time In: 1030  Time Out: Mariusz Palomares, PTA

## 2017-03-03 ENCOUNTER — HOSPITAL ENCOUNTER (OUTPATIENT)
Dept: PHYSICAL THERAPY | Age: 77
Discharge: HOME | End: 2017-03-03
Payer: MEDICARE

## 2017-03-03 PROCEDURE — 97113 AQUATIC THERAPY/EXERCISES: CPT

## 2017-03-03 NOTE — PROGRESS NOTES
Wild Mcleod  : 1940 [address] @ [address] [address].  [phone]   [phone]        OUTPATIENT PHYSICAL THERAPY:Daily Note 3/3/2017      ICD-10: Treatment Diagnosis: Difficulty in walking, not elsewhere classified (R26.2)  Pain in right knee (M25.561)  Precautions/Allergies:   Prednisone   Fall Risk Score: 2 (? 5 = High Risk)  MD Orders: Evaluate and Treat MEDICAL/REFERRING DIAGNOSIS:  Status post total right knee replacement [Z96.651]   DATE OF ONSET: 17  REFERRING PHYSICIAN: Arin Gacria MD  RETURN PHYSICIAN APPOINTMENT: 17     INITIAL ASSESSMENT:  Mr. Otilia Jefferson is a 76year old white male seen for physical therapy per MD orders with complaint of right knee pain following R TKA. Pt evaluated for outpatient physical therapy today with the following deficits: right knee pain, decreased ROM/ strength R knee complex, decreased static, dynamic standing balance, antalgic gait. Pt would benefit from physical therapy to address the above deficits in order to return to increased independence with functional mobility with decreased pain. Pt would benefit from initially working in aquatic setting to off load R knee while addressing goals. As patient progresses, plan to transition to gravity challenging, land based exercises/ activities. Plan of care and goals reviewed with patient who verbalizes agreement. PROBLEM LIST (Impacting functional limitations):  1. Decreased Strength  2. Decreased ADL/Functional Activities  3. Decreased Ambulation Ability/Technique  4. Decreased Balance  5. Increased Pain  6. Decreased Flexibility/Joint Mobility  7. Edema/Girth INTERVENTIONS PLANNED:  1. Balance Exercise  2. Gait Training  3. Home Exercise Program (HEP)  4. Manual Therapy  5. Range of Motion (ROM)  6. Therapeutic Activites  7. Therapeutic Exercise/Strengthening  8. aquatics   9.  Modalities   TREATMENT PLAN:  Effective Dates: 02/22/17 TO 05/17/17. Frequency/Duration: 2- 3 times a week for 12 weeks    GOALS: (Goals have been discussed and agreed upon with patient.)  Short-Term Functional Goals: Time Frame: 2 weeks  Patient will demonstrate independence and compliance with home exercise program for management of symptoms. Pt will demonstrate increase in Lower Extremity Functional Scale by 3-4  points for improved functional mobility. Pt will demonstrate increased active range of motion for right knee  from 0 ° (extension) to 115 ° (flexion). Pt will tolerate 35 to 40 minutes of aquatic therapy with pain of 2-3 /10 following intervention. Discharge Goals: Time Frame: 6 weeks  Pt will report Lower Extremity Functional Scale score of 60/80 for improved function. Pt will demonstrate active range of motion of R knee from 0 ° (extension) to 125 °(flexion). Pt will ambulate independently >/= 1500 feet with normal gait pattern with even stance time/ step length in bilateral lower extremities for safe community entry for grocery shopping. Pt will demonstrate reciprocal gait up/ down 12 steps independently with use of unilateral handrail. Rehabilitation Potential For Stated Goals: Good    Regarding Kitty Hodge's therapy, I certify that the treatment plan above will be carried out by a therapist or under their direction. Thank you for this referral,  Milady Forrest PTA                  HISTORY:   History of Present Injury/Illness (Reason for Referral):  Pt is 76year old white male seen for physical therapy following R TKA 01/23/17. Prior to that most recent surgery pt had L TKA 10/28/16 with rehab as well as L1 kyphoplasty 01/13/17. Pt reports minimal pain throughout the day, reporting that most pain noted with stiffness when he doesn't move. Pt enjoys yard work, gardening and performing at nursing homes. Pt reports his goal is to get back to these activities.     Past Medical History/Comorbidities:   Mr. Santos Lewis  has a past medical history of Diabetes ([facility] [address] GERD (gastroesophageal reflux disease); and Hypertension. He also has no past medical history of Difficult intubation; Malignant hyperthermia due to anesthesia; Nausea & vomiting; or Pseudocholinesterase deficiency. Mr. Blanca Owens  has a past surgical history that includes colonoscopy; endoscopy; and orthopaedic (Left). Social History/Living Environment:     Pt lives alone in one story home with 2 to 4 step to enter. Prior Level of Function/Work/Activity:  Pt enjoys gardening, yard work, singing/ music  Dominant Side:         RIGHT  Current Medications:    Current Outpatient Prescriptions:     HYDROcodone-acetaminophen (NORCO) 5-325 mg per tablet, Take 1-2 Tabs by mouth every six (6) hours as needed for Pain. Max Daily Amount: 8 Tabs., Disp: 50 Tab, Rfl: 0    amLODIPine (NORVASC) 2.5 mg tablet, , Disp: , Rfl:     metFORMIN (GLUCOPHAGE) 500 mg tablet, , Disp: , Rfl:     omeprazole (PRILOSEC) 40 mg capsule, , Disp: , Rfl:     triamcinolone acetonide (KENALOG) 0.1 % topical cream, , Disp: , Rfl:     zolpidem (AMBIEN) 5 mg tablet, Take  by mouth nightly as needed for Sleep., Disp: , Rfl:     sucralfate (CARAFATE) 1 gram tablet, Take 1 g by mouth four (4) times daily. , Disp: , Rfl:    Date Last Reviewed:  3/3/2017 pt brought list in chart--see paper copy in chart   EXAMINATION:   Observation/Orthostatic Postural Assessment:          Pt with noted edema in R knee complex- soft and boggy feel. In standing pt with noted thoracic kyphosis/ forward head.     Palpation:          Palpable tenderness in medial R knee (along hamstring insertion region)  ROM:  BUE WFL    BLE WFL with knee ROM as follows:  R knee:  0 to 108 °  L knee 0 to 115 °                  Strength:     Date:  02/22/17 Date:   Date:     LE MMT Left Right Left Right Left Right   Hip Flex (L1/L2) 4+/5 4/5       Hip Abd (glut med) 4/5 4/5       Hip Ext 4/5 4/5       Quad    ( L3/4) 4/5 2+/5**       Hamstring 4/5 2+/5** Anterior Tib (L4/L5) WFL WFL       Gastroc (S1/2) WFL WFL       EHL (L5)                           ** indicates limited strength based on limited ROM  Special Tests: deferred  Neurological Screen:        Sensation: Intact for light touch  Functional Mobility:         Gait/Ambulation:  Pt amb. without A.D however decreased heel strike RLE and decreased R knee flexion in swing phase of gait pattern. Transfers:  Pt demonstrates sit to stand to sit from standard seat without use of UE. Bed Mobility:  I with bed mobility        Stairs:  Pt demonstrates step to gait pattern for up and down stairs relying heavily on handrail. Balance:          Single leg stance:  R) 2 sec; L) 7 sec   Body Structures Involved:  1. Bones  2. Joints  3. Muscles  4. Ligaments Body Functions Affected:  1. Sensory/Pain  2. Neuromusculoskeletal  3. Movement Related Activities and Participation Affected:  1. General Tasks and Demands  2. Mobility  3. Self Care  4. Domestic Life  5. Community, Social and Civic Life   CLINICAL DECISION MAKING:   Outcome Measure: Tool Used: Lower Extremity Functional Scale (LEFS)  Score:  Initial: 40/80 Most Recent: X/80 (Date: -- )   Interpretation of Score: 20 questions each scored on a 5 point scale with 0 representing \"extreme difficulty or unable to perform\" and 4 representing \"no difficulty\". The lower the score, the greater the functional disability. 80/80 represents no disability. Minimal detectable change is 9 points. Score 80 79-63 62-48 47-32 31-16 15-1 0   Modifier CH CI CJ CK CL CM CN     ?  Mobility - Walking and Moving Around:     - CURRENT STATUS: CK - 40%-59% impaired, limited or restricted    - GOAL STATUS: CI - 1%-19% impaired, limited or restricted    - D/C STATUS:  ---------------To be determined---------------    Medical Necessity:   · Patient is expected to demonstrate progress in strength, range of motion and balance to increase independence with functional mobility safely. Reason for Services/Other Comments:  · Patient continues to require modification of therapeutic interventions to increase complexity of exercises. TREATMENT:   (In addition to Assessment/Re-Assessment sessions the following treatments were rendered)      Therapeutic Exercise: (see flow sheet below for minutes):  Exercises per grid below to improve mobility, strength and balance. Required minimal visual and verbal cues to promote proper body alignment and promote proper body mechanics. Progressed resistance, range and repetitions as indicated. Aquatic Therapy (see flow sheet below for minutes): Aquatic treatment performed per flow grid for Decreased muscle strength, Decreased static/dynamic balance and reactive control, Decreased range of motion and Ease of movement. Cues provided for technique. Assistance by therapist provided for progression of activities. Patient has difficulty with R knee pain. Date: 02/22/17 2/23/17 02/27/17 03/01/17 3/3/17   Modalities: 12 min 12 min      IFC with ice  To R knee in long sitting To R knee long sitting                      Manual Therapy:  12 minutes      Soft tissue mobilization distal quad/hamstrings, ITB, proximal gastroc  5 min      Scar mobilization  5 min      Patella mobilizations  2x20 each direction      Aquatic Exercise:  1.5#/  40 min  60 mins  2.5# 60 min 2.5# 2.5# 55 min   Gait F/B/S/M  x4 Laps forward, backward  x2 L marches and side steps x4L ea x4L each x4L   Heel/Toe   x20 raises x30 x2L x2L ea   4-way  x15 BLE x20 B x20 BLE    PF/DF  x20      Hamstring Curls  x15 BLE x2L x4L x4L   Hip Flexion with knee ext.   (rockette)   x20 x20 BLE    Squats    x20 x30 x20 BLE x20H3   SLR march    x4L x4L   Lunges    x20 BLE x4L   Hip circles   Fig 4 x10 H5 x20 BLE    Hip horizontal abduction/adduction        Core:          Core:        Deep well bike   7 mins 3 min 3 min   Deep well jumping david   2min 2 min 2 min   Deep well scissors 2min 2 min 2 min   Deep well:  traction   6 min             Hamstring Stretch  3 H 30 2xH30 B 2H30 BLE 2h30   Step Lunge  x10 H 3-5 R knee x10H5 B 10H5 BLE 10H5   Piriformis stretch        PF Stretch  3 H 30 off edge of steps      Balance: Single leg Stance    2H30 BLE    Balance: Tandem    2H30 BLE    Step ups    x10 BLE              Therapeutic Exercise: 5  min       Quad sets X15 BLE       Heel slides X15 BLE       LAQ X15 BLE at EOB                               F=forward  B=Backward  S=sidesteps  M=marches    H=hold    Manual: (see flow sheet above for minutes)   Modalities: (see flow sheet above for minutes) Pt declined today    HEP: Pt instructed to continue HEP per home health intervention. Treatment/Session Assessment: Pt able to complete all aquatic activities without painful episodes. Plan to add UE resistance next visit for challenge to stability. · Pain/ Symptoms: Initial:   0/10 R knee  Pt reports doing well with no to very little pain most of the time Post Session:  0/10  ·     · Compliance with Program/Exercises: Will assess as treatment progresses. · Recommendations/Intent for next treatment session: \"Next visit will focus on advancements to more challenging activities\". Continue aquatics at next session.       Total Treatment Duration:   PT Patient Time In/Time Out  Time In: 1015  Time Out: [address], PTA

## 2017-03-06 ENCOUNTER — HOSPITAL ENCOUNTER (OUTPATIENT)
Dept: PHYSICAL THERAPY | Age: 77
Discharge: HOME | End: 2017-03-06
Payer: MEDICARE

## 2017-03-06 PROCEDURE — 97113 AQUATIC THERAPY/EXERCISES: CPT

## 2017-03-06 NOTE — PROGRESS NOTES
Porter Bosworth  : 1940 [address] @ [address] [address].  [phone]   [phone]        OUTPATIENT PHYSICAL THERAPY:Daily Note 3/6/2017      ICD-10: Treatment Diagnosis: Difficulty in walking, not elsewhere classified (R26.2)  Pain in right knee (M25.561)  Precautions/Allergies:   Prednisone   Fall Risk Score: 2 (? 5 = High Risk)  MD Orders: Evaluate and Treat MEDICAL/REFERRING DIAGNOSIS:  Status post total right knee replacement [Z96.651]   DATE OF ONSET: 17  REFERRING PHYSICIAN: Yoan Rust MD  RETURN PHYSICIAN APPOINTMENT: 17     INITIAL ASSESSMENT:  Mr. Rudy Garcia is a 76year old white male seen for physical therapy per MD orders with complaint of right knee pain following R TKA. Pt evaluated for outpatient physical therapy today with the following deficits: right knee pain, decreased ROM/ strength R knee complex, decreased static, dynamic standing balance, antalgic gait. Pt would benefit from physical therapy to address the above deficits in order to return to increased independence with functional mobility with decreased pain. Pt would benefit from initially working in aquatic setting to off load R knee while addressing goals. As patient progresses, plan to transition to gravity challenging, land based exercises/ activities. Plan of care and goals reviewed with patient who verbalizes agreement. PROBLEM LIST (Impacting functional limitations):  1. Decreased Strength  2. Decreased ADL/Functional Activities  3. Decreased Ambulation Ability/Technique  4. Decreased Balance  5. Increased Pain  6. Decreased Flexibility/Joint Mobility  7. Edema/Girth INTERVENTIONS PLANNED:  1. Balance Exercise  2. Gait Training  3. Home Exercise Program (HEP)  4. Manual Therapy  5. Range of Motion (ROM)  6. Therapeutic Activites  7. Therapeutic Exercise/Strengthening  8. aquatics   9.  Modalities   TREATMENT PLAN:  Effective Dates: 02/22/17 TO 05/17/17. Frequency/Duration: 2- 3 times a week for 12 weeks    GOALS: (Goals have been discussed and agreed upon with patient.)  Short-Term Functional Goals: Time Frame: 2 weeks  Patient will demonstrate independence and compliance with home exercise program for management of symptoms. (MET)  Pt will demonstrate increase in Lower Extremity Functional Scale by 3-4  points for improved functional mobility. Pt will demonstrate increased active range of motion for right knee  from 0 ° (extension) to 115 ° (flexion). (MET)  Pt will tolerate 35 to 40 minutes of aquatic therapy with pain of 2-3 /10 following intervention.  (MET)  Discharge Goals: Time Frame: 6 weeks  Pt will report Lower Extremity Functional Scale score of 60/80 for improved function. Pt will demonstrate active range of motion of R knee from 0 ° (extension) to 125 °(flexion). Pt will ambulate independently >/= 1500 feet with normal gait pattern with even stance time/ step length in bilateral lower extremities for safe community entry for grocery shopping. Pt will demonstrate reciprocal gait up/ down 12 steps independently with use of unilateral handrail. Rehabilitation Potential For Stated Goals: Good    Regarding Amanda Hodge's therapy, I certify that the treatment plan above will be carried out by a therapist or under their direction. Thank you for this referral,  Tia Mccloud PTA                  HISTORY:   History of Present Injury/Illness (Reason for Referral):  Pt is 76year old white male seen for physical therapy following R TKA 01/23/17. Prior to that most recent surgery pt had L TKA 10/28/16 with rehab as well as L1 kyphoplasty 01/13/17. Pt reports minimal pain throughout the day, reporting that most pain noted with stiffness when he doesn't move. Pt enjoys yard work, gardening and performing at nursing homes. Pt reports his goal is to get back to these activities.     Past Medical History/Comorbidities:   Mr. Funes Peter has a past medical history of Diabetes ([address] GERD (gastroesophageal reflux disease); and Hypertension. He also has no past medical history of Difficult intubation; Malignant hyperthermia due to anesthesia; Nausea & vomiting; or Pseudocholinesterase deficiency. Mr. Armando Ram  has a past surgical history that includes colonoscopy; endoscopy; and orthopaedic (Left). Social History/Living Environment:     Pt lives alone in one story home with 2 to 4 step to enter. Prior Level of Function/Work/Activity:  Pt enjoys gardening, yard work, singing/ music  Dominant Side:         RIGHT  Current Medications:    Current Outpatient Prescriptions:     HYDROcodone-acetaminophen (NORCO) 5-325 mg per tablet, Take 1-2 Tabs by mouth every six (6) hours as needed for Pain. Max Daily Amount: 8 Tabs., Disp: 50 Tab, Rfl: 0    amLODIPine (NORVASC) 2.5 mg tablet, , Disp: , Rfl:     metFORMIN (GLUCOPHAGE) 500 mg tablet, , Disp: , Rfl:     omeprazole (PRILOSEC) 40 mg capsule, , Disp: , Rfl:     triamcinolone acetonide (KENALOG) 0.1 % topical cream, , Disp: , Rfl:     zolpidem (AMBIEN) 5 mg tablet, Take  by mouth nightly as needed for Sleep., Disp: , Rfl:     sucralfate (CARAFATE) 1 gram tablet, Take 1 g by mouth four (4) times daily. , Disp: , Rfl:    Date Last Reviewed:  3/6/2017 pt brought list in chart--see paper copy in chart   EXAMINATION:   Observation/Orthostatic Postural Assessment:          Pt with noted edema in R knee complex- soft and boggy feel. In standing pt with noted thoracic kyphosis/ forward head.     Palpation:          Palpable tenderness in medial R knee (along hamstring insertion region)  ROM:  BUE WFL    BLE WFL with knee ROM as follows:  R knee:  0 to 108 °  L knee 0 to 115 °    3/6/17  R knee 0-120 °                    Strength:     Date:  02/22/17 Date:   Date:     LE MMT Left Right Left Right Left Right   Hip Flex (L1/L2) 4+/5 4/5       Hip Abd (glut med) 4/5 4/5       Hip Ext 4/5 4/5       Quad    ( L3/4) 4/5 2+/5**       Hamstring 4/5 2+/5**       Anterior Tib (L4/L5) WFL WFL       Gastroc (S1/2) WFL WFL       EHL (L5)                           ** indicates limited strength based on limited ROM  Special Tests: deferred  Neurological Screen:        Sensation: Intact for light touch  Functional Mobility:         Gait/Ambulation:  Pt amb. without A.D however decreased heel strike RLE and decreased R knee flexion in swing phase of gait pattern. Transfers:  Pt demonstrates sit to stand to sit from standard seat without use of UE. Bed Mobility:  I with bed mobility        Stairs:  Pt demonstrates step to gait pattern for up and down stairs relying heavily on handrail. Balance:          Single leg stance:  R) 2 sec; L) 7 sec   Body Structures Involved:  1. Bones  2. Joints  3. Muscles  4. Ligaments Body Functions Affected:  1. Sensory/Pain  2. Neuromusculoskeletal  3. Movement Related Activities and Participation Affected:  1. General Tasks and Demands  2. Mobility  3. Self Care  4. Domestic Life  5. Community, Social and Civic Life   CLINICAL DECISION MAKING:   Outcome Measure: Tool Used: Lower Extremity Functional Scale (LEFS)  Score:  Initial: 40/80 Most Recent: X/80 (Date: -- )   Interpretation of Score: 20 questions each scored on a 5 point scale with 0 representing \"extreme difficulty or unable to perform\" and 4 representing \"no difficulty\". The lower the score, the greater the functional disability. 80/80 represents no disability. Minimal detectable change is 9 points. Score 80 79-63 62-48 47-32 31-16 15-1 0   Modifier CH CI CJ CK CL CM CN     ?  Mobility - Walking and Moving Around:     - CURRENT STATUS: CK - 40%-59% impaired, limited or restricted    - GOAL STATUS: CI - 1%-19% impaired, limited or restricted    - D/C STATUS:  ---------------To be determined---------------    Medical Necessity:   · Patient is expected to demonstrate progress in strength, range of motion and balance to increase independence with functional mobility safely. Reason for Services/Other Comments:  · Patient continues to require modification of therapeutic interventions to increase complexity of exercises. TREATMENT:   (In addition to Assessment/Re-Assessment sessions the following treatments were rendered)      Therapeutic Exercise: (see flow sheet below for minutes):  Exercises per grid below to improve mobility, strength and balance. Required minimal visual and verbal cues to promote proper body alignment and promote proper body mechanics. Progressed resistance, range and repetitions as indicated. Aquatic Therapy (see flow sheet below for minutes): Aquatic treatment performed per flow grid for Decreased muscle strength, Decreased static/dynamic balance and reactive control, Decreased range of motion and Ease of movement. Cues provided for technique. Assistance by therapist provided for progression of activities. Patient has difficulty with R knee pain. Date: 02/22/17 2/23/17 02/27/17 03/01/17 3/3/17 3/6/17   Modalities: 12 min 12 min       IFC with ice  To R knee in long sitting To R knee long sitting                         Manual Therapy:  12 minutes       Soft tissue mobilization distal quad/hamstrings, ITB, proximal gastroc  5 min       Scar mobilization  5 min       Patella mobilizations  2x20 each direction       Aquatic Exercise:  1.5#/  40 min  60 mins  2.5# 60 min 2.5# 2.5# 55 min 2.5# 60 min   Gait F/B/S/M  x4 Laps forward, backward  x2 L marches and side steps x4L ea x4L each x4L x4L closed paddles   Heel/Toe   x20 raises x30 x2L x2L ea x2L ea   4-way  x15 BLE x20 B x20 BLE     PF/DF  x20       Hamstring Curls  x15 BLE x2L x4L x4L x4L   Hip Flexion with knee ext.   (rockette)   x20 x20 BLE     Squats    x20 x30 x20 BLE x20H3 x20 H3   SLR march    x4L x4L x4L   Lunges    x20 BLE x4L x4L   Hip circles   Fig 4 x10 H5 x20 BLE     Hip horizontal abduction/adduction         Core: Core:         Deep well bike   7 mins 3 min 3 min 4 min   Deep well jumping david   2min 2 min 2 min 2 min   Deep well scissors   2min 2 min 2 min 2 min   Deep well:  traction   6 min      Step ups       x15 BLE   Hamstring Stretch  3 H 30 2xH30 B 2H30 BLE 2h30 2H30   Step Lunge  x10 H 3-5 R knee x10H5 B 10H5 BLE 10H5 10H5   Piriformis stretch         PF Stretch  3 H 30 off edge of steps       Balance: Single leg Stance    2H30 BLE     Balance: Tandem    2H30 BLE  x2L gait   Step ups    x10 BLE                Therapeutic Exercise: 5  min        Quad sets X15 BLE        Heel slides X15 BLE        LAQ X15 BLE at EOB                                   F=forward  B=Backward  S=sidesteps  M=marches    H=hold    Manual: (see flow sheet above for minutes)   Modalities: (see flow sheet above for minutes) Pt declined today    HEP: Pt instructed to continue HEP per home health intervention. Treatment/Session Assessment: Continued aquatics adding UE resistance for increased challenge to stability. Pt is progressing well, plan to increase stability work over next 2 visits and then transition to land. Improved ROM 0-120 ° today. Plan to work 2-3 more visits in water with higher level exercises and then transition to land. · Pain/ Symptoms: Initial:   0/10 R knee  Pt reports doing well with no to very little pain most of the time Post Session:  0/10  ·     · Compliance with Program/Exercises: Will assess as treatment progresses. · Recommendations/Intent for next treatment session: \"Next visit will focus on advancements to more challenging activities\". Continue aquatics at next session.       Total Treatment Duration:   PT Patient Time In/Time Out  Time In: 1015  Time Out: [address], [facility] [place]

## 2017-03-08 ENCOUNTER — HOSPITAL ENCOUNTER (OUTPATIENT)
Dept: PHYSICAL THERAPY | Age: 77
Discharge: HOME | End: 2017-03-08
Payer: MEDICARE

## 2017-03-08 PROCEDURE — 97113 AQUATIC THERAPY/EXERCISES: CPT

## 2017-03-08 NOTE — PROGRESS NOTES
Wild Mcleod  : 1940 [id number] [facility] @ [address]  [address] [place] [address].  [phone]   [phone]        OUTPATIENT PHYSICAL THERAPY:Daily Note 3/8/2017      ICD-10: Treatment Diagnosis: Difficulty in walking, not elsewhere classified (R26.2)  Pain in right knee (M25.561)  Precautions/Allergies:   Prednisone   Fall Risk Score: 2 (? 5 = High Risk)  MD Orders: Evaluate and Treat MEDICAL/REFERRING DIAGNOSIS:  Status post total right knee replacement [Z96.651]   DATE OF ONSET: 17  REFERRING PHYSICIAN: Arin Garcia MD  RETURN PHYSICIAN APPOINTMENT: 17     INITIAL ASSESSMENT:  Mr. Otilia Jefferson is a 76year old white male seen for physical therapy per MD orders with complaint of right knee pain following R TKA. Pt evaluated for outpatient physical therapy today with the following deficits: right knee pain, decreased ROM/ strength R knee complex, decreased static, dynamic standing balance, antalgic gait. Pt would benefit from physical therapy to address the above deficits in order to return to increased independence with functional mobility with decreased pain. Pt would benefit from initially working in aquatic setting to off load R knee while addressing goals. As patient progresses, plan to transition to gravity challenging, land based exercises/ activities. Plan of care and goals reviewed with patient who verbalizes agreement. PROBLEM LIST (Impacting functional limitations):  1. Decreased Strength  2. Decreased ADL/Functional Activities  3. Decreased Ambulation Ability/Technique  4. Decreased Balance  5. Increased Pain  6. Decreased Flexibility/Joint Mobility  7. Edema/Girth INTERVENTIONS PLANNED:  1. Balance Exercise  2. Gait Training  3. Home Exercise Program (HEP)  4. Manual Therapy  5. Range of Motion (ROM)  6. Therapeutic Activites  7. Therapeutic Exercise/Strengthening  8. aquatics   9.  Modalities   TREATMENT PLAN:  Effective Dates: 02/22/17 TO 05/17/17. Frequency/Duration: 2- 3 times a week for 12 weeks    GOALS: (Goals have been discussed and agreed upon with patient.)  Short-Term Functional Goals: Time Frame: 2 weeks  Patient will demonstrate independence and compliance with home exercise program for management of symptoms. (MET)  Pt will demonstrate increase in Lower Extremity Functional Scale by 3-4  points for improved functional mobility. Pt will demonstrate increased active range of motion for right knee  from 0 ° (extension) to 115 ° (flexion). (MET)  Pt will tolerate 35 to 40 minutes of aquatic therapy with pain of 2-3 /10 following intervention.  (MET)  Discharge Goals: Time Frame: 6 weeks  Pt will report Lower Extremity Functional Scale score of 60/80 for improved function. Pt will demonstrate active range of motion of R knee from 0 ° (extension) to 125 °(flexion). Pt will ambulate independently >/= 1500 feet with normal gait pattern with even stance time/ step length in bilateral lower extremities for safe community entry for grocery shopping. Pt will demonstrate reciprocal gait up/ down 12 steps independently with use of unilateral handrail. Rehabilitation Potential For Stated Goals: Good    Regarding Ward Hodge's therapy, I certify that the treatment plan above will be carried out by a therapist or under their direction. Thank you for this referral,  Elias Reed PTA                  HISTORY:   History of Present Injury/Illness (Reason for Referral):  Pt is 76year old white male seen for physical therapy following R TKA 01/23/17. Prior to that most recent surgery pt had L TKA 10/28/16 with rehab as well as L1 kyphoplasty 01/13/17. Pt reports minimal pain throughout the day, reporting that most pain noted with stiffness when he doesn't move. Pt enjoys yard work, gardening and performing at nursing homes. Pt reports his goal is to get back to these activities.     Past Medical History/Comorbidities:   Mr. Rosanne Hodge has a past medical history of Diabetes ([facility] [address] GERD (gastroesophageal reflux disease); and Hypertension. He also has no past medical history of Difficult intubation; Malignant hyperthermia due to anesthesia; Nausea & vomiting; or Pseudocholinesterase deficiency. Mr. Ama Cameron  has a past surgical history that includes colonoscopy; endoscopy; and orthopaedic (Left). Social History/Living Environment:     Pt lives alone in one story home with 2 to 4 step to enter. Prior Level of Function/Work/Activity:  Pt enjoys gardening, yard work, singing/ music  Dominant Side:         RIGHT  Current Medications:    Current Outpatient Prescriptions:     HYDROcodone-acetaminophen (NORCO) 5-325 mg per tablet, Take 1-2 Tabs by mouth every six (6) hours as needed for Pain. Max Daily Amount: 8 Tabs., Disp: 50 Tab, Rfl: 0    amLODIPine (NORVASC) 2.5 mg tablet, , Disp: , Rfl:     metFORMIN (GLUCOPHAGE) 500 mg tablet, , Disp: , Rfl:     omeprazole (PRILOSEC) 40 mg capsule, , Disp: , Rfl:     triamcinolone acetonide (KENALOG) 0.1 % topical cream, , Disp: , Rfl:     zolpidem (AMBIEN) 5 mg tablet, Take  by mouth nightly as needed for Sleep., Disp: , Rfl:     sucralfate (CARAFATE) 1 gram tablet, Take 1 g by mouth four (4) times daily. , Disp: , Rfl:    Date Last Reviewed:  3/8/2017 pt brought list in chart--see paper copy in chart   EXAMINATION:   Observation/Orthostatic Postural Assessment:          Pt with noted edema in R knee complex- soft and boggy feel. In standing pt with noted thoracic kyphosis/ forward head.     Palpation:          Palpable tenderness in medial R knee (along hamstring insertion region)  ROM:  BUE WFL    BLE WFL with knee ROM as follows:  R knee:  0 to 108 °  L knee 0 to 115 °    3/6/17  R knee 0-120 °                    Strength:     Date:  02/22/17 Date:   Date:     LE MMT Left Right Left Right Left Right   Hip Flex (L1/L2) 4+/5 4/5       Hip Abd (glut med) 4/5 4/5       Hip Ext 4/5 4/5       Quad    ( L3/4) 4/5 2+/5**       Hamstring 4/5 2+/5**       Anterior Tib (L4/L5) WFL WFL       Gastroc (S1/2) WFL WFL       EHL (L5)                           ** indicates limited strength based on limited ROM  Special Tests: deferred  Neurological Screen:        Sensation: Intact for light touch  Functional Mobility:         Gait/Ambulation:  Pt amb. without A.D however decreased heel strike RLE and decreased R knee flexion in swing phase of gait pattern. Transfers:  Pt demonstrates sit to stand to sit from standard seat without use of UE. Bed Mobility:  I with bed mobility        Stairs:  Pt demonstrates step to gait pattern for up and down stairs relying heavily on handrail. Balance:          Single leg stance:  R) 2 sec; L) 7 sec   Body Structures Involved:  1. Bones  2. Joints  3. Muscles  4. Ligaments Body Functions Affected:  1. Sensory/Pain  2. Neuromusculoskeletal  3. Movement Related Activities and Participation Affected:  1. General Tasks and Demands  2. Mobility  3. Self Care  4. Domestic Life  5. Community, Social and Civic Life   CLINICAL DECISION MAKING:   Outcome Measure: Tool Used: Lower Extremity Functional Scale (LEFS)  Score:  Initial: 40/80 Most Recent: X/80 (Date: -- )   Interpretation of Score: 20 questions each scored on a 5 point scale with 0 representing \"extreme difficulty or unable to perform\" and 4 representing \"no difficulty\". The lower the score, the greater the functional disability. 80/80 represents no disability. Minimal detectable change is 9 points. Score 80 79-63 62-48 47-32 31-16 15-1 0   Modifier CH CI CJ CK CL CM CN     ?  Mobility - Walking and Moving Around:     - CURRENT STATUS: CK - 40%-59% impaired, limited or restricted    - GOAL STATUS: CI - 1%-19% impaired, limited or restricted    - D/C STATUS:  ---------------To be determined---------------    Medical Necessity:   · Patient is expected to demonstrate progress in strength, range of motion and balance to increase independence with functional mobility safely. Reason for Services/Other Comments:  · Patient continues to require modification of therapeutic interventions to increase complexity of exercises. TREATMENT:   (In addition to Assessment/Re-Assessment sessions the following treatments were rendered)      Therapeutic Exercise: (see flow sheet below for minutes):  Exercises per grid below to improve mobility, strength and balance. Required minimal visual and verbal cues to promote proper body alignment and promote proper body mechanics. Progressed resistance, range and repetitions as indicated. Aquatic Therapy (see flow sheet below for minutes): Aquatic treatment performed per flow grid for Decreased muscle strength, Decreased static/dynamic balance and reactive control, Decreased range of motion and Ease of movement. Cues provided for technique. Assistance by therapist provided for progression of activities. Patient has difficulty with R knee pain. Date: 02/22/17 2/23/17 02/27/17 03/01/17 3/3/17 3/6/17 3/8/17   Modalities: 12 min 12 min        IFC with ice  To R knee in long sitting To R knee long sitting                            Manual Therapy:  12 minutes        Soft tissue mobilization distal quad/hamstrings, ITB, proximal gastroc  5 min        Scar mobilization  5 min        Patella mobilizations  2x20 each direction        Aquatic Exercise:  1.5#/  40 min  60 mins  2.5# 60 min 2.5# 2.5# 55 min 2.5# 60 min 3.75# 60 min   Gait F/B/S/M  x4 Laps forward, backward  x2 L marches and side steps x4L ea x4L each x4L x4L closed paddles x4L closed paddles   Heel/Toe   x20 raises x30 x2L x2L ea x2L ea x2L ea   4-way  x15 BLE x20 B x20 BLE      PF/DF  x20        Hamstring Curls  x15 BLE x2L x4L x4L x4L x4L   Hip Flexion with knee ext.   (rockette)   x20 x20 BLE      Squats    x20 x30 x20 BLE x20H3 x20 H3 Single leg x15   SLR march    x4L x4L x4L x4L   Lunges    x20 BLE x4L x4L x4L   Hip circles Fig 4 x10 H5 x20 BLE      Hip horizontal abduction/adduction          Core:            Core:          Deep well bike   7 mins 3 min 3 min 4 min 4 min   Deep well jumping david   2min 2 min 2 min 2 min 2 min   Deep well scissors   2min 2 min 2 min 2 min 2 min   Deep well:  traction   6 min       Step ups       x15 BLE Stairs 4 steps x4 in pool   Hamstring Stretch  3 H 30 2xH30 B 2H30 BLE 2h30 2H30    Step Lunge  x10 H 3-5 R knee x10H5 B 10H5 BLE 10H5 10H5    Piriformis stretch          PF Stretch  3 H 30 off edge of steps        Balance: Single leg Stance    2H30 BLE      Balance: Tandem    2H30 BLE  x2L gait x2L   Step ups    x10 BLE   VMO step down 2x10               Therapeutic Exercise: 5  min         Quad sets X15 BLE         Heel slides X15 BLE         LAQ X15 BLE at EOB                                       F=forward  B=Backward  S=sidesteps  M=marches    H=hold    Manual: (see flow sheet above for minutes)   Modalities: (see flow sheet above for minutes) Pt declined today    HEP: Pt instructed to continue HEP per home health intervention. Treatment/Session Assessment: Continued aquatics increasing weights to 3.75#. Tolerated with no pain. Difficulty with reciprocal pattern stairs in pool today. Plan to work 2 more visits in water advancing exercises and then transition to land. · Pain/ Symptoms: Initial:   0/10 R knee  Pt reports doing well with no to very little pain most of the time Post Session:  0/10  ·     · Compliance with Program/Exercises: Will assess as treatment progresses. · Recommendations/Intent for next treatment session: \"Next visit will focus on advancements to more challenging activities\". Continue aquatics at next session.       Total Treatment Duration:   PT Patient Time In/Time Out  Time In: 1010  Time Out: [address], PTA

## 2017-03-09 NOTE — PROGRESS NOTES
Ashish Zamora  : 1940 [address] @ [address]  [address] [place] [address].  [phone]   [phone]        OUTPATIENT PHYSICAL THERAPY:Daily Note and Progress Report 3/10/2017      ICD-10: Treatment Diagnosis: Difficulty in walking, not elsewhere classified (R26.2)  Pain in right knee (M25.561)  Precautions/Allergies:   Prednisone   Fall Risk Score: 2 (? 5 = High Risk)  MD Orders: Evaluate and Treat MEDICAL/REFERRING DIAGNOSIS:  Status post total right knee replacement [Z96.651]   DATE OF ONSET: 17  REFERRING PHYSICIAN: Marques Rosales MD  RETURN PHYSICIAN APPOINTMENT: 17     INITIAL ASSESSMENT:  Mr. Rosanne Hodge is a 76year old white male seen for physical therapy per MD orders with complaint of right knee pain following R TKA. Pt evaluated for outpatient physical therapy today with the following deficits: right knee pain, decreased ROM/ strength R knee complex, decreased static, dynamic standing balance, antalgic gait. Pt would benefit from physical therapy to address the above deficits in order to return to increased independence with functional mobility with decreased pain. Pt would benefit from initially working in aquatic setting to off load R knee while addressing goals. As patient progresses, plan to transition to gravity challenging, land based exercises/ activities. Plan of care and goals reviewed with patient who verbalizes agreement. PROBLEM LIST (Impacting functional limitations):  1. Decreased Strength  2. Decreased ADL/Functional Activities  3. Decreased Ambulation Ability/Technique  4. Decreased Balance  5. Increased Pain  6. Decreased Flexibility/Joint Mobility  7. Edema/Girth INTERVENTIONS PLANNED:  1. Balance Exercise  2. Gait Training  3. Home Exercise Program (HEP)  4. Manual Therapy  5. Range of Motion (ROM)  6. Therapeutic Activites  7. Therapeutic Exercise/Strengthening  8. aquatics   9.  Modalities   TREATMENT PLAN:  Effective Dates: 02/22/17 TO 05/17/17. Frequency/Duration: 2- 3 times a week for 12 weeks    GOALS: (Goals have been discussed and agreed upon with patient.)  Short-Term Functional Goals: Time Frame: 2 weeks  Patient will demonstrate independence and compliance with home exercise program for management of symptoms. (MET)  Pt will demonstrate increase in Lower Extremity Functional Scale by 3-4  points for improved functional mobility. (MET 3/10/17)  Pt will demonstrate increased active range of motion for right knee  from 0 ° (extension) to 115 ° (flexion). (MET)  Pt will tolerate 35 to 40 minutes of aquatic therapy with pain of 2-3 /10 following intervention.  (MET)  Discharge Goals: Time Frame: 6 weeks  Pt will report Lower Extremity Functional Scale score of 60/80 for improved function. (Progressing 3/10/17)  Pt will demonstrate active range of motion of R knee from 0 ° (extension) to 125 °(flexion). (Met 3/10/17)  Pt will ambulate independently >/= 1500 feet with normal gait pattern with even stance time/ step length in bilateral lower extremities for safe community entry for grocery shopping. (progressing 3/10/17)  Pt will demonstrate reciprocal gait up/ down 12 steps independently with use of unilateral handrail. (progressing 3/10/17)    Rehabilitation Potential For Stated Goals: Good    Regarding Nehemiah Hodge's therapy, I certify that the treatment plan above will be carried out by a therapist or under their direction. Thank you for this referral,  Kimberly Curran, PT                  HISTORY:   History of Present Injury/Illness (Reason for Referral):  Pt is 76year old white male seen for physical therapy following R TKA 01/23/17. Prior to that most recent surgery pt had L TKA 10/28/16 with rehab as well as L1 kyphoplasty 01/13/17. Pt reports minimal pain throughout the day, reporting that most pain noted with stiffness when he doesn't move.   Pt enjoys yard work, gardening and performing at nursing homes. Pt reports his goal is to get back to these activities. Past Medical History/Comorbidities:   Mr. Emily Hughes  has a past medical history of Diabetes ([address] GERD (gastroesophageal reflux disease); and Hypertension. He also has no past medical history of Difficult intubation; Malignant hyperthermia due to anesthesia; Nausea & vomiting; or Pseudocholinesterase deficiency. Mr. Emily Hughes  has a past surgical history that includes colonoscopy; endoscopy; and orthopaedic (Left). Social History/Living Environment:     Pt lives alone in one story home with 2 to 4 step to enter. Prior Level of Function/Work/Activity:  Pt enjoys gardening, yard work, singing/ music  Dominant Side:         RIGHT  Current Medications:    Current Outpatient Prescriptions:     HYDROcodone-acetaminophen (NORCO) 5-325 mg per tablet, Take 1-2 Tabs by mouth every six (6) hours as needed for Pain. Max Daily Amount: 8 Tabs., Disp: 50 Tab, Rfl: 0    amLODIPine (NORVASC) 2.5 mg tablet, , Disp: , Rfl:     metFORMIN (GLUCOPHAGE) 500 mg tablet, , Disp: , Rfl:     omeprazole (PRILOSEC) 40 mg capsule, , Disp: , Rfl:     triamcinolone acetonide (KENALOG) 0.1 % topical cream, , Disp: , Rfl:     zolpidem (AMBIEN) 5 mg tablet, Take  by mouth nightly as needed for Sleep., Disp: , Rfl:     sucralfate (CARAFATE) 1 gram tablet, Take 1 g by mouth four (4) times daily. , Disp: , Rfl:    Date Last Reviewed:  3/10/2017 pt brought list in chart--see paper copy in chart   EXAMINATION:   Observation/Orthostatic Postural Assessment:          Pt with noted edema in R knee complex- soft and boggy feel. In standing pt with noted thoracic kyphosis/ forward head.     Palpation:          Palpable tenderness in medial R knee (along hamstring insertion region)  ROM:  BUE WFL    BLE WFL with knee ROM as follows:  R knee:  0 to 108 °  L knee 0 to 115 °    3/6/17  R knee 0-120 °    3/10/17 (PN)  R knee  0-125 ° AAROM                      Strength: Date:  02/22/17 Date: (PN)  3/10/17 Date:     LE MMT Left Right Left Right Left Right   Hip Flex (L1/L2) 4+/5 4/5       Hip Abd (glut med) 4/5 4/5       Hip Ext 4/5 4/5       Quad    ( L3/4) 4/5 2+/5**  4/5     Hamstring 4/5 2+/5**  4/5     Anterior Tib (L4/L5) WFL WFL       Gastroc (S1/2) WFL WFL       EHL (L5)                           ** indicates limited strength based on limited ROM  Special Tests: deferred  Neurological Screen:        Sensation: Intact for light touch  Functional Mobility:         Gait/Ambulation:  Pt amb. without A.D however decreased heel strike RLE and decreased R knee flexion in swing phase of gait pattern. Transfers:  Pt demonstrates sit to stand to sit from standard seat without use of UE. Bed Mobility:  I with bed mobility        Stairs:  Pt demonstrates step to gait pattern for up and down stairs relying heavily on handrail. Balance:          Single leg stance:  R) 2 sec; L) 7 sec   Body Structures Involved:  1. Bones  2. Joints  3. Muscles  4. Ligaments Body Functions Affected:  1. Sensory/Pain  2. Neuromusculoskeletal  3. Movement Related Activities and Participation Affected:  1. General Tasks and Demands  2. Mobility  3. Self Care  4. Domestic Life  5. Community, Social and Civic Life   CLINICAL DECISION MAKING:   Outcome Measure: Tool Used: Lower Extremity Functional Scale (LEFS)  Score:  Initial: 40/80 Most Recent: 55/80 (Date: 3/10/17)   Interpretation of Score: 20 questions each scored on a 5 point scale with 0 representing \"extreme difficulty or unable to perform\" and 4 representing \"no difficulty\". The lower the score, the greater the functional disability. 80/80 represents no disability. Minimal detectable change is 9 points. Score 80 79-63 62-48 47-32 31-16 15-1 0   Modifier CH CI CJ CK CL CM CN     ?  Mobility - Walking and Moving Around:     - CURRENT STATUS: CJ - 20%-39% impaired, limited or restricted    - GOAL STATUS: CI - 1%-19% impaired, limited or restricted    - D/C STATUS:  ---------------To be determined---------------    Medical Necessity:   · Patient is expected to demonstrate progress in strength, range of motion and balance to increase independence with functional mobility safely. Reason for Services/Other Comments:  · Patient continues to require modification of therapeutic interventions to increase complexity of exercises. TREATMENT:   (In addition to Assessment/Re-Assessment sessions the following treatments were rendered)      Therapeutic Exercise: (see flow sheet below for minutes):  Exercises per grid below to improve mobility, strength and balance. Required minimal visual and verbal cues to promote proper body alignment and promote proper body mechanics. Progressed resistance, range and repetitions as indicated. Aquatic Therapy (see flow sheet below for minutes): Aquatic treatment performed per flow grid for Decreased muscle strength, Decreased static/dynamic balance and reactive control, Decreased range of motion and Ease of movement. Cues provided for technique. Assistance by therapist provided for progression of activities. Patient has difficulty with R knee pain.      Date: 02/22/17 2/23/17 02/27/17 03/01/17 3/3/17 3/6/17 3/8/17 3/10/17  (PN)   Modalities: 12 min 12 min         IFC with ice  To R knee in long sitting To R knee long sitting                               Manual Therapy:  12 minutes         Soft tissue mobilization distal quad/hamstrings, ITB, proximal gastroc  5 min         Scar mobilization  5 min         Patella mobilizations  2x20 each direction         Aquatic Exercise:  1.5#/  40 min  60 mins  2.5# 60 min 2.5# 2.5# 55 min 2.5# 60 min 3.75# 60 min 2.5#  45 min   Gait F/B/S/M  x4 Laps forward, backward  x2 L marches and side steps x4L ea x4L each x4L x4L closed paddles x4L closed paddles 4L   Heel/Toe   x20 raises x30 x2L x2L ea x2L ea x2L ea    4-way  x15 BLE x20 B x20 BLE       PF/DF x20         Hamstring Curls  x15 BLE x2L x4L x4L x4L x4L 4L   Hip Flexion with knee ext. (rockette)   x20 x20 BLE    2L   Squats    x20 x30 x20 BLE x20H3 x20 H3 Single leg x15 X15 Single Leg   SLR march    x4L x4L x4L x4L 4L   Lunges    x20 BLE x4L x4L x4L 4L   Hip circles   Fig 4 x10 H5 x20 BLE       Hip horizontal abduction/adduction           Core:             Core:           Deep well bike   7 mins 3 min 3 min 4 min 4 min 5 min   Deep well jumping david   2min 2 min 2 min 2 min 2 min 2 min   Deep well scissors   2min 2 min 2 min 2 min 2 min 2 min   Deep well:  traction   6 min        Step ups       x15 BLE Stairs 4 steps x4 in pool 4x recip gait   Hamstring Stretch  3 H 30 2xH30 B 2H30 BLE 2h30 2H30  2H30 B LE   Step Lunge  x10 H 3-5 R knee x10H5 B 10H5 BLE 10H5 10H5  2H30 B LE   Piriformis stretch           PF Stretch  3 H 30 off edge of steps         Balance: Single leg Stance    2H30 BLE       Balance: Tandem    2H30 BLE  x2L gait x2L    Step ups    x10 BLE   VMO step down 2x10                 Therapeutic Exercise: 5  min          Quad sets X15 BLE          Heel slides X15 BLE          LAQ X15 BLE at EOB                                           F=forward  B=Backward  S=sidesteps  M=marches    H=hold    Manual: (see flow sheet above for minutes)   Modalities: (see flow sheet above for minutes) Pt declined today    HEP: Pt instructed to continue HEP per home health intervention. Treatment/Session Assessment: Progress Note performed today--see above. LEFS reassessment performed, goals reassessed, ROM/Strength R. He is making progress towards all goals set. Limiting area is stairs, walking community distances. Recommend a 2-4 visits in clinic to instr. Written HEP for home prior to d/c from OP PT services. His R knee ROM/strength  Is progressing well. Continued aquatics per flow sheet above. Tolerated with no pain. Difficulty with reciprocal pattern stairs in pool today.  Plan to work 1 more visits in water advancing exercises and then transition to land. · Pain/ Symptoms: Initial:   0/10 R knee  Pt reports doing well with no to very little pain most of the time Post Session:  0/10  ·     · Compliance with Program/Exercises: Will assess as treatment progresses. · Recommendations/Intent for next treatment session: \"Next visit will focus on advancements to more challenging activities\". Continue aquatics at next session.       Total Treatment Duration:   PT Patient Time In/Time Out  Time In: 1015  Time Out: [address] [place]

## 2017-03-10 ENCOUNTER — HOSPITAL ENCOUNTER (OUTPATIENT)
Dept: PHYSICAL THERAPY | Age: 77
Discharge: HOME | End: 2017-03-10
Payer: MEDICARE

## 2017-03-10 PROCEDURE — G8978 MOBILITY CURRENT STATUS: HCPCS

## 2017-03-10 PROCEDURE — G8979 MOBILITY GOAL STATUS: HCPCS

## 2017-03-10 PROCEDURE — 97113 AQUATIC THERAPY/EXERCISES: CPT

## 2017-03-13 ENCOUNTER — HOSPITAL ENCOUNTER (OUTPATIENT)
Dept: PHYSICAL THERAPY | Age: 77
Discharge: HOME | End: 2017-03-13
Payer: MEDICARE

## 2017-03-13 PROCEDURE — 97113 AQUATIC THERAPY/EXERCISES: CPT

## 2017-03-13 NOTE — PROGRESS NOTES
Stan Merino  : 1940 [id number] [facility],[address] @ [address]  [address] [place] [address].  [phone]   [phone]        OUTPATIENT PHYSICAL THERAPY:Daily Note 3/13/2017      ICD-10: Treatment Diagnosis: Difficulty in walking, not elsewhere classified (R26.2)  Pain in right knee (M25.561)  Precautions/Allergies:   Prednisone   Fall Risk Score: 2 (? 5 = High Risk)  MD Orders: Evaluate and Treat MEDICAL/REFERRING DIAGNOSIS:  Status post total right knee replacement [Z96.651]   DATE OF ONSET: 17  REFERRING PHYSICIAN: Robbin Cee MD  RETURN PHYSICIAN APPOINTMENT: 17     INITIAL ASSESSMENT:  Mr. Ama Cameron is a 76year old white male seen for physical therapy per MD orders with complaint of right knee pain following R TKA. Pt evaluated for outpatient physical therapy today with the following deficits: right knee pain, decreased ROM/ strength R knee complex, decreased static, dynamic standing balance, antalgic gait. Pt would benefit from physical therapy to address the above deficits in order to return to increased independence with functional mobility with decreased pain. Pt would benefit from initially working in aquatic setting to off load R knee while addressing goals. As patient progresses, plan to transition to gravity challenging, land based exercises/ activities. Plan of care and goals reviewed with patient who verbalizes agreement. PROBLEM LIST (Impacting functional limitations):  1. Decreased Strength  2. Decreased ADL/Functional Activities  3. Decreased Ambulation Ability/Technique  4. Decreased Balance  5. Increased Pain  6. Decreased Flexibility/Joint Mobility  7. Edema/Girth INTERVENTIONS PLANNED:  1. Balance Exercise  2. Gait Training  3. Home Exercise Program (HEP)  4. Manual Therapy  5. Range of Motion (ROM)  6. Therapeutic Activites  7. Therapeutic Exercise/Strengthening  8. aquatics   9.  Modalities   TREATMENT PLAN:  Effective Dates: 02/22/17 TO 05/17/17. Frequency/Duration: 2- 3 times a week for 12 weeks    GOALS: (Goals have been discussed and agreed upon with patient.)  Short-Term Functional Goals: Time Frame: 2 weeks  Patient will demonstrate independence and compliance with home exercise program for management of symptoms. (MET)  Pt will demonstrate increase in Lower Extremity Functional Scale by 3-4  points for improved functional mobility. (MET 3/10/17)  Pt will demonstrate increased active range of motion for right knee  from 0 ° (extension) to 115 ° (flexion). (MET)  Pt will tolerate 35 to 40 minutes of aquatic therapy with pain of 2-3 /10 following intervention.  (MET)  Discharge Goals: Time Frame: 6 weeks  Pt will report Lower Extremity Functional Scale score of 60/80 for improved function. (Progressing 3/10/17)  Pt will demonstrate active range of motion of R knee from 0 ° (extension) to 125 °(flexion). (Met 3/10/17)  Pt will ambulate independently >/= 1500 feet with normal gait pattern with even stance time/ step length in bilateral lower extremities for safe community entry for grocery shopping. (progressing 3/10/17)  Pt will demonstrate reciprocal gait up/ down 12 steps independently with use of unilateral handrail. (progressing 3/10/17)    Rehabilitation Potential For Stated Goals: Good                    HISTORY:   History of Present Injury/Illness (Reason for Referral):  Pt is 76year old white male seen for physical therapy following R TKA 01/23/17. Prior to that most recent surgery pt had L TKA 10/28/16 with rehab as well as L1 kyphoplasty 01/13/17. Pt reports minimal pain throughout the day, reporting that most pain noted with stiffness when he doesn't move. Pt enjoys yard work, gardening and performing at nursing homes. Pt reports his goal is to get back to these activities.     Past Medical History/Comorbidities:   Mr. Isaías Arshad  has a past medical history of Diabetes ([address] GERD (gastroesophageal reflux disease); and Hypertension. He also has no past medical history of Difficult intubation; Malignant hyperthermia due to anesthesia; Nausea & vomiting; or Pseudocholinesterase deficiency. Mr. Shakira Wang  has a past surgical history that includes colonoscopy; endoscopy; and orthopaedic (Left). Social History/Living Environment:     Pt lives alone in one story home with 2 to 4 step to enter. Prior Level of Function/Work/Activity:  Pt enjoys gardening, yard work, singing/ music  Dominant Side:         RIGHT  Current Medications:    Current Outpatient Prescriptions:     HYDROcodone-acetaminophen (NORCO) 5-325 mg per tablet, Take 1-2 Tabs by mouth every six (6) hours as needed for Pain. Max Daily Amount: 8 Tabs., Disp: 50 Tab, Rfl: 0    amLODIPine (NORVASC) 2.5 mg tablet, , Disp: , Rfl:     metFORMIN (GLUCOPHAGE) 500 mg tablet, , Disp: , Rfl:     omeprazole (PRILOSEC) 40 mg capsule, , Disp: , Rfl:     triamcinolone acetonide (KENALOG) 0.1 % topical cream, , Disp: , Rfl:     zolpidem (AMBIEN) 5 mg tablet, Take  by mouth nightly as needed for Sleep., Disp: , Rfl:     sucralfate (CARAFATE) 1 gram tablet, Take 1 g by mouth four (4) times daily. , Disp: , Rfl:    Date Last Reviewed:  3/13/2017 pt brought list in chart--see paper copy in chart   EXAMINATION:   Observation/Orthostatic Postural Assessment:          Pt with noted edema in R knee complex- soft and boggy feel. In standing pt with noted thoracic kyphosis/ forward head.     Palpation:          Palpable tenderness in medial R knee (along hamstring insertion region)  ROM:  BUE WFL    BLE WFL with knee ROM as follows:  R knee:  0 to 108 °  L knee 0 to 115 °    3/6/17  R knee 0-120 °    3/10/17 (PN)  R knee  0-125 ° AAROM                      Strength:     Date:  02/22/17 Date: (PN)  3/10/17 Date:     LE MMT Left Right Left Right Left Right   Hip Flex (L1/L2) 4+/5 4/5       Hip Abd (glut med) 4/5 4/5       Hip Ext 4/5 4/5       Quad    ( L3/4) 4/5 2+/5**  4/5     Hamstring 4/5 2+/5**  4/5     Anterior Tib (L4/L5) WFL WFL       Gastroc (S1/2) WFL WFL       EHL (L5)                           ** indicates limited strength based on limited ROM  Special Tests: deferred  Neurological Screen:        Sensation: Intact for light touch  Functional Mobility:         Gait/Ambulation:  Pt amb. without A.D however decreased heel strike RLE and decreased R knee flexion in swing phase of gait pattern. Transfers:  Pt demonstrates sit to stand to sit from standard seat without use of UE. Bed Mobility:  I with bed mobility        Stairs:  Pt demonstrates step to gait pattern for up and down stairs relying heavily on handrail. Balance:          Single leg stance:  R) 2 sec; L) 7 sec   Body Structures Involved:  1. Bones  2. Joints  3. Muscles  4. Ligaments Body Functions Affected:  1. Sensory/Pain  2. Neuromusculoskeletal  3. Movement Related Activities and Participation Affected:  1. General Tasks and Demands  2. Mobility  3. Self Care  4. Domestic Life  5. Community, Social and Civic Life   CLINICAL DECISION MAKING:   Outcome Measure: Tool Used: Lower Extremity Functional Scale (LEFS)  Score:  Initial: 40/80 Most Recent: 55/80 (Date: 3/10/17)   Interpretation of Score: 20 questions each scored on a 5 point scale with 0 representing \"extreme difficulty or unable to perform\" and 4 representing \"no difficulty\". The lower the score, the greater the functional disability. 80/80 represents no disability. Minimal detectable change is 9 points. Score 80 79-63 62-48 47-32 31-16 15-1 0   Modifier CH CI CJ CK CL CM CN     ?  Mobility - Walking and Moving Around:     - CURRENT STATUS: CJ - 20%-39% impaired, limited or restricted    - GOAL STATUS: CI - 1%-19% impaired, limited or restricted    - D/C STATUS:  ---------------To be determined---------------    Medical Necessity:   · Patient is expected to demonstrate progress in strength, range of motion and balance to increase independence with functional mobility safely. Reason for Services/Other Comments:  · Patient continues to require modification of therapeutic interventions to increase complexity of exercises. TREATMENT:   (In addition to Assessment/Re-Assessment sessions the following treatments were rendered)      Therapeutic Exercise: (see flow sheet below for minutes):  Exercises per grid below to improve mobility, strength and balance. Required minimal visual and verbal cues to promote proper body alignment and promote proper body mechanics. Progressed resistance, range and repetitions as indicated. Aquatic Therapy (see flow sheet below for minutes): Aquatic treatment performed per flow grid for Decreased muscle strength, Decreased static/dynamic balance and reactive control, Decreased range of motion and Ease of movement. Cues provided for technique. Assistance by therapist provided for progression of activities. Patient has difficulty with R knee pain. Date: 02/22/17 2/23/17 02/27/17 03/01/17 3/3/17 3/6/17 3/8/17 3/10/17  (PN) 3/12/17   Modalities: 12 min 12 min          IFC with ice  To R knee in long sitting To R knee long sitting                                  Manual Therapy:  12 minutes          Soft tissue mobilization distal quad/hamstrings, ITB, proximal gastroc  5 min          Scar mobilization  5 min          Patella mobilizations  2x20 each direction          Aquatic Exercise:  1.5#/  40 min  60 mins  2.5# 60 min 2.5# 2.5# 55 min 2.5# 60 min 3.75# 60 min 2.5#  45 min 3.75# 55 min   Gait F/B/S/M  x4 Laps forward, backward  x2 L marches and side steps x4L ea x4L each x4L x4L closed paddles x4L closed paddles 4L x4L closed paddles   Heel/Toe   x20 raises x30 x2L x2L ea x2L ea x2L ea  x2L ea   4-way  x15 BLE x20 B x20 BLE        PF/DF  x20          Hamstring Curls  x15 BLE x2L x4L x4L x4L x4L 4L x4L   Hip Flexion with knee ext.   (rockette)   x20 x20 BLE    2L x2L   Squats    x20 x30 x20 BLE x20H3 x20 H3 Single leg x15 X15 Single Leg x20 single leg   SLR march    x4L x4L x4L x4L 4L x4L   Lunges    x20 BLE x4L x4L x4L 4L x4L   Hip circles   Fig 4 x10 H5 x20 BLE        Hip horizontal abduction/adduction            Core:              Core:            Deep well bike   7 mins 3 min 3 min 4 min 4 min 5 min 4 min   Deep well jumping david   2min 2 min 2 min 2 min 2 min 2 min 2 min   Deep well scissors   2min 2 min 2 min 2 min 2 min 2 min 2 min   Deep well:  traction   6 min         Step ups       x15 BLE Stairs 4 steps x4 in pool 4x recip gait 4x recip gait   Hamstring Stretch  3 H 30 2xH30 B 2H30 BLE 2h30 2H30  2H30 B LE    Step Lunge  x10 H 3-5 R knee x10H5 B 10H5 BLE 10H5 10H5  2H30 B LE x10H5 BLE   Piriformis stretch            PF Stretch  3 H 30 off edge of steps          Balance: Single leg Stance    2H30 BLE        Balance: Tandem    2H30 BLE  x2L gait x2L  x4L   Step ups    x10 BLE   VMO step down 2x10  VMO step down 2x15                 Therapeutic Exercise: 5  min           Quad sets X15 BLE           Heel slides X15 BLE           LAQ X15 BLE at EOB                                               F=forward  B=Backward  S=sidesteps  M=marches    H=hold    Manual: (see flow sheet above for minutes)   Modalities: (see flow sheet above for minutes)     HEP: Pt instructed to continue HEP per home health intervention. Treatment/Session Assessment: . Pt continued advancing aquatics and tolerated well. Demonstrates decreased stability when adding UE resistance to challenge balance and requires railing for higher level gait/balance exercises. Pt tolerates aquatics without pain. Plan to transition to land next visit but follow with high level aquatics for balance. · Pain/ Symptoms: Initial:   0/10 R knee  Pt reports doing well. \"I have no pain! \" Post Session:  0/10  ·     · Compliance with Program/Exercises: Will assess as treatment progresses. · Recommendations/Intent for next treatment session:  \"Next visit will focus on advancements to more challenging activities\". Transition to land next session with higher level aquatics following.      Total Treatment Duration:   PT Patient Time In/Time Out  Time In: 1010  Time Out: 11 [facility], [facility]

## 2017-03-15 ENCOUNTER — HOSPITAL ENCOUNTER (OUTPATIENT)
Dept: PHYSICAL THERAPY | Age: 77
Discharge: HOME | End: 2017-03-15
Payer: MEDICARE

## 2017-03-15 PROCEDURE — 97110 THERAPEUTIC EXERCISES: CPT

## 2017-03-15 NOTE — PROGRESS NOTES
Caryn Adamson  : 1940 [address] @ [address] [place] [address].  [phone]   [phone]        OUTPATIENT PHYSICAL THERAPY:Daily Note 3/15/2017      ICD-10: Treatment Diagnosis: Difficulty in walking, not elsewhere classified (R26.2)  Pain in right knee (M25.561)  Precautions/Allergies:   Prednisone   Fall Risk Score: 2 (? 5 = High Risk)  MD Orders: Evaluate and Treat MEDICAL/REFERRING DIAGNOSIS:  Status post total right knee replacement [Z96.651]   DATE OF ONSET: 17  REFERRING PHYSICIAN: Adalberto Lim MD  RETURN PHYSICIAN APPOINTMENT: 17     INITIAL ASSESSMENT:  Mr. Annel Castro is a 76year old white male seen for physical therapy per MD orders with complaint of right knee pain following R TKA. Pt evaluated for outpatient physical therapy today with the following deficits: right knee pain, decreased ROM/ strength R knee complex, decreased static, dynamic standing balance, antalgic gait. Pt would benefit from physical therapy to address the above deficits in order to return to increased independence with functional mobility with decreased pain. Pt would benefit from initially working in aquatic setting to off load R knee while addressing goals. As patient progresses, plan to transition to gravity challenging, land based exercises/ activities. Plan of care and goals reviewed with patient who verbalizes agreement. PROBLEM LIST (Impacting functional limitations):  1. Decreased Strength  2. Decreased ADL/Functional Activities  3. Decreased Ambulation Ability/Technique  4. Decreased Balance  5. Increased Pain  6. Decreased Flexibility/Joint Mobility  7. Edema/Girth INTERVENTIONS PLANNED:  1. Balance Exercise  2. Gait Training  3. Home Exercise Program (HEP)  4. Manual Therapy  5. Range of Motion (ROM)  6. Therapeutic Activites  7. Therapeutic Exercise/Strengthening  8. aquatics   9.  Modalities   TREATMENT PLAN:  Effective Dates: 02/22/17 TO 05/17/17. Frequency/Duration: 2- 3 times a week for 12 weeks    GOALS: (Goals have been discussed and agreed upon with patient.)  Short-Term Functional Goals: Time Frame: 2 weeks  Patient will demonstrate independence and compliance with home exercise program for management of symptoms. (MET)  Pt will demonstrate increase in Lower Extremity Functional Scale by 3-4  points for improved functional mobility. (MET 3/10/17)  Pt will demonstrate increased active range of motion for right knee  from 0 ° (extension) to 115 ° (flexion). (MET)  Pt will tolerate 35 to 40 minutes of aquatic therapy with pain of 2-3 /10 following intervention.  (MET)  Discharge Goals: Time Frame: 6 weeks  Pt will report Lower Extremity Functional Scale score of 60/80 for improved function. (Progressing 3/10/17)  Pt will demonstrate active range of motion of R knee from 0 ° (extension) to 125 °(flexion). (Met 3/10/17)  Pt will ambulate independently >/= 1500 feet with normal gait pattern with even stance time/ step length in bilateral lower extremities for safe community entry for grocery shopping. (progressing 3/10/17)  Pt will demonstrate reciprocal gait up/ down 12 steps independently with use of unilateral handrail. (progressing 3/10/17)    Rehabilitation Potential For Stated Goals: Good                    HISTORY:   History of Present Injury/Illness (Reason for Referral):  Pt is 76year old white male seen for physical therapy following R TKA 01/23/17. Prior to that most recent surgery pt had L TKA 10/28/16 with rehab as well as L1 kyphoplasty 01/13/17. Pt reports minimal pain throughout the day, reporting that most pain noted with stiffness when he doesn't move. Pt enjoys yard work, gardening and performing at nursing homes. Pt reports his goal is to get back to these activities.     Past Medical History/Comorbidities:   Mr. Brody Olguin  has a past medical history of Diabetes ([address] GERD (gastroesophageal reflux disease); and Hypertension. He also has no past medical history of Difficult intubation; Malignant hyperthermia due to anesthesia; Nausea & vomiting; or Pseudocholinesterase deficiency. Mr. William Barker  has a past surgical history that includes colonoscopy; endoscopy; and orthopaedic (Left). Social History/Living Environment:     Pt lives alone in one story home with 2 to 4 step to enter. Prior Level of Function/Work/Activity:  Pt enjoys gardening, yard work, singing/ music  Dominant Side:         RIGHT  Current Medications:    Current Outpatient Prescriptions:     HYDROcodone-acetaminophen (NORCO) 5-325 mg per tablet, Take 1-2 Tabs by mouth every six (6) hours as needed for Pain. Max Daily Amount: 8 Tabs., Disp: 50 Tab, Rfl: 0    amLODIPine (NORVASC) 2.5 mg tablet, , Disp: , Rfl:     metFORMIN (GLUCOPHAGE) 500 mg tablet, , Disp: , Rfl:     omeprazole (PRILOSEC) 40 mg capsule, , Disp: , Rfl:     triamcinolone acetonide (KENALOG) 0.1 % topical cream, , Disp: , Rfl:     zolpidem (AMBIEN) 5 mg tablet, Take  by mouth nightly as needed for Sleep., Disp: , Rfl:     sucralfate (CARAFATE) 1 gram tablet, Take 1 g by mouth four (4) times daily. , Disp: , Rfl:    Date Last Reviewed:  3/15/2017 pt brought list in chart--see paper copy in chart   EXAMINATION:   Observation/Orthostatic Postural Assessment:          Pt with noted edema in R knee complex- soft and boggy feel. In standing pt with noted thoracic kyphosis/ forward head.     Palpation:          Palpable tenderness in medial R knee (along hamstring insertion region)  ROM:  BUE WFL    BLE WFL with knee ROM as follows:  R knee:  0 to 108 °  L knee 0 to 115 °    3/6/17  R knee 0-120 °    3/10/17 (PN)  R knee  0-125 ° AAROM        Strength:     Date:  02/22/17 Date: (PN)  3/10/17 Date:     LE MMT Left Right Left Right Left Right   Hip Flex (L1/L2) 4+/5 4/5       Hip Abd (glut med) 4/5 4/5       Hip Ext 4/5 4/5       Quad    ( L3/4) 4/5 2+/5**  4/5     Hamstring 4/5 2+/5**  4/5 Anterior Tib (L4/L5) WFL WFL       Gastroc (S1/2) WFL WFL       EHL (L5)                           ** indicates limited strength based on limited ROM  Special Tests: deferred  Neurological Screen:        Sensation: Intact for light touch  Functional Mobility:         Gait/Ambulation:  Pt amb. without A.D however decreased heel strike RLE and decreased R knee flexion in swing phase of gait pattern. Transfers:  Pt demonstrates sit to stand to sit from standard seat without use of UE. Bed Mobility:  I with bed mobility        Stairs:  Pt demonstrates step to gait pattern for up and down stairs relying heavily on handrail. Balance:          Single leg stance:  R) 2 sec; L) 7 sec   Body Structures Involved:  1. Bones  2. Joints  3. Muscles  4. Ligaments Body Functions Affected:  1. Sensory/Pain  2. Neuromusculoskeletal  3. Movement Related Activities and Participation Affected:  1. General Tasks and Demands  2. Mobility  3. Self Care  4. Domestic Life  5. Community, Social and Civic Life   CLINICAL DECISION MAKING:   Outcome Measure: Tool Used: Lower Extremity Functional Scale (LEFS)  Score:  Initial: 40/80 Most Recent: 55/80 (Date: 3/10/17)   Interpretation of Score: 20 questions each scored on a 5 point scale with 0 representing \"extreme difficulty or unable to perform\" and 4 representing \"no difficulty\". The lower the score, the greater the functional disability. 80/80 represents no disability. Minimal detectable change is 9 points. Score 80 79-63 62-48 47-32 31-16 15-1 0   Modifier CH CI CJ CK CL CM CN     ?  Mobility - Walking and Moving Around:     - CURRENT STATUS: CJ - 20%-39% impaired, limited or restricted    - GOAL STATUS: CI - 1%-19% impaired, limited or restricted    - D/C STATUS:  ---------------To be determined---------------    Medical Necessity:   · Patient is expected to demonstrate progress in strength, range of motion and balance to increase independence with functional mobility safely. Reason for Services/Other Comments:  · Patient continues to require modification of therapeutic interventions to increase complexity of exercises. TREATMENT:   (In addition to Assessment/Re-Assessment sessions the following treatments were rendered)      Therapeutic Exercise: (see flow sheet below for minutes):  Exercises per grid below to improve mobility, strength and balance. Required minimal visual and verbal cues to promote proper body alignment and promote proper body mechanics. Progressed resistance, range and repetitions as indicated. Aquatic Therapy (see flow sheet below for minutes): Aquatic treatment performed per flow grid for Decreased muscle strength, Decreased static/dynamic balance and reactive control, Decreased range of motion and Ease of movement. Cues provided for technique. Assistance by therapist provided for progression of activities. Patient has difficulty with R knee pain. Date: 02/22/17 2/23/17 02/27/17 03/01/17 3/3/17 3/6/17 3/8/17 3/10/17  (PN) 3/12/17 03/15/17    Modalities: 12 min 12 min            IFC with ice  To R knee in long sitting To R knee long sitting                                        Manual Therapy:  12 minutes            Soft tissue mobilization distal quad/hamstrings, ITB, proximal gastroc  5 min            Scar mobilization  5 min            Patella mobilizations  2x20 each direction            Aquatic Exercise:  1.5#/  40 min  60 mins  2.5# 60 min 2.5# 2.5# 55 min 2.5# 60 min 3.75# 60 min 2.5#  45 min 3.75# 55 min     Gait F/B/S/M  x4 Laps forward, backward  x2 L marches and side steps x4L ea x4L each x4L x4L closed paddles x4L closed paddles 4L x4L closed paddles     Heel/Toe   x20 raises x30 x2L x2L ea x2L ea x2L ea  x2L ea     4-way  x15 BLE x20 B x20 BLE          PF/DF  x20            Hamstring Curls  x15 BLE x2L x4L x4L x4L x4L 4L x4L     Hip Flexion with knee ext.   (rockette)   x20 x20 BLE    2L x2L     Squats    x20 x30 x20 BLE x20H3 x20 H3 Single leg x15 X15 Single Leg x20 single leg     SLR march    x4L x4L x4L x4L 4L x4L     Lunges    x20 BLE x4L x4L x4L 4L x4L     Hip circles   Fig 4 x10 H5 x20 BLE          Hip horizontal abduction/adduction              Core:                Core:              Deep well bike   7 mins 3 min 3 min 4 min 4 min 5 min 4 min     Deep well jumping david   2min 2 min 2 min 2 min 2 min 2 min 2 min     Deep well scissors   2min 2 min 2 min 2 min 2 min 2 min 2 min     Deep well:  traction   6 min           Step ups       x15 BLE Stairs 4 steps x4 in pool 4x recip gait 4x recip gait     Hamstring Stretch  3 H 30 2xH30 B 2H30 BLE 2h30 2H30  2H30 B LE      Step Lunge  x10 H 3-5 R knee x10H5 B 10H5 BLE 10H5 10H5  2H30 B LE x10H5 BLE     Piriformis stretch              PF Stretch  3 H 30 off edge of steps            Balance: Single leg Stance    2H30 BLE          Balance: Tandem    2H30 BLE  x2L gait x2L  x4L     Step ups    x10 BLE   VMO step down 2x10  VMO step down 2x15                     Therapeutic Exercise: 5  min         50 mins    Quad sets X15 BLE             Heel slides X15 BLE             LAQ X15 BLE at EOB         3# X20 b    Mini-Squat          x20    4-way hip          Red TB x20B    Marching for hip flexion          3# x20 B    Calf raises          x20    HSC          3# X20 B    Bridge c add ball sqz          x20    Bridge c abd tb hold          Red TB x20    Sit to stand          unable    Step 4\" and 6'          x15 B ea                                Bike           10 mins    F=forward  B=Backward  S=sidesteps  M=marches    H=hold    Manual: (see flow sheet above for minutes)   Modalities: (see flow sheet above for minutes)     HEP: Pt instructed to continue HEP per home health intervention. Treatment/Session Assessment: . Pt was unable to transition from sit-to-stand from low chair without UE assist, and demonstrated severe difficulty using UE assist.  Pt was instructed with and performed TherEx per flow sheet above without exacerbation of knee pain. Verbal cuing to assure proper execution and body mechanics. · Pain/ Symptoms: Initial:   0/10 R knee  Pt says fees \"good\", denies knee pain at time of treatment today. Post Session:  0/10  ·     · Compliance with Program/Exercises: Will assess as treatment progresses. · Recommendations/Intent for next treatment session: \"Next visit will focus on advancements to more challenging activities\". Continue with TherEx tx session, maximizing effort to improve functional strength, flexibility and mobility, prior to discharge.      Total Treatment Duration:   PT Patient Time In/Time Out  Time In: 1015  Time Out: [address]

## 2017-03-17 ENCOUNTER — HOSPITAL ENCOUNTER (OUTPATIENT)
Dept: PHYSICAL THERAPY | Age: 77
Discharge: HOME | End: 2017-03-17
Payer: MEDICARE

## 2017-03-17 PROCEDURE — 97110 THERAPEUTIC EXERCISES: CPT

## 2017-03-17 NOTE — PROGRESS NOTES
Tari Lu  : 1940 [address] @ [address] [address].  [phone]   [phone]        OUTPATIENT PHYSICAL THERAPY:Daily Note 3/17/2017      ICD-10: Treatment Diagnosis: Difficulty in walking, not elsewhere classified (R26.2)  Pain in right knee (M25.561)  Precautions/Allergies:   Prednisone   Fall Risk Score: 2 (? 5 = High Risk)  MD Orders: Evaluate and Treat MEDICAL/REFERRING DIAGNOSIS:  Status post total right knee replacement [Z96.651]   DATE OF ONSET: 17  REFERRING PHYSICIAN: Mark Stephen MD  RETURN PHYSICIAN APPOINTMENT: 17     INITIAL ASSESSMENT:  Mr. Manju Monteiro is a 76year old white male seen for physical therapy per MD orders with complaint of right knee pain following R TKA. Pt evaluated for outpatient physical therapy today with the following deficits: right knee pain, decreased ROM/ strength R knee complex, decreased static, dynamic standing balance, antalgic gait. Pt would benefit from physical therapy to address the above deficits in order to return to increased independence with functional mobility with decreased pain. Pt would benefit from initially working in aquatic setting to off load R knee while addressing goals. As patient progresses, plan to transition to gravity challenging, land based exercises/ activities. Plan of care and goals reviewed with patient who verbalizes agreement. PROBLEM LIST (Impacting functional limitations):  1. Decreased Strength  2. Decreased ADL/Functional Activities  3. Decreased Ambulation Ability/Technique  4. Decreased Balance  5. Increased Pain  6. Decreased Flexibility/Joint Mobility  7. Edema/Girth INTERVENTIONS PLANNED:  1. Balance Exercise  2. Gait Training  3. Home Exercise Program (HEP)  4. Manual Therapy  5. Range of Motion (ROM)  6. Therapeutic Activites  7. Therapeutic Exercise/Strengthening  8. aquatics   9.  Modalities   TREATMENT PLAN:  Effective Dates: 02/22/17 TO 05/17/17. Frequency/Duration: 2- 3 times a week for 12 weeks    GOALS: (Goals have been discussed and agreed upon with patient.)  Short-Term Functional Goals: Time Frame: 2 weeks  Patient will demonstrate independence and compliance with home exercise program for management of symptoms. (MET)  Pt will demonstrate increase in Lower Extremity Functional Scale by 3-4  points for improved functional mobility. (MET 3/10/17)  Pt will demonstrate increased active range of motion for right knee  from 0 ° (extension) to 115 ° (flexion). (MET)  Pt will tolerate 35 to 40 minutes of aquatic therapy with pain of 2-3 /10 following intervention.  (MET)  Discharge Goals: Time Frame: 6 weeks  Pt will report Lower Extremity Functional Scale score of 60/80 for improved function. (Progressing 3/10/17)  Pt will demonstrate active range of motion of R knee from 0 ° (extension) to 125 °(flexion). (Met 3/10/17)  Pt will ambulate independently >/= 1500 feet with normal gait pattern with even stance time/ step length in bilateral lower extremities for safe community entry for grocery shopping. (progressing 3/10/17)  Pt will demonstrate reciprocal gait up/ down 12 steps independently with use of unilateral handrail. (progressing 3/10/17)    Rehabilitation Potential For Stated Goals: Good                    HISTORY:   History of Present Injury/Illness (Reason for Referral):  Pt is 76year old white male seen for physical therapy following R TKA 01/23/17. Prior to that most recent surgery pt had L TKA 10/28/16 with rehab as well as L1 kyphoplasty 01/13/17. Pt reports minimal pain throughout the day, reporting that most pain noted with stiffness when he doesn't move. Pt enjoys yard work, gardening and performing at nursing homes. Pt reports his goal is to get back to these activities.     Past Medical History/Comorbidities:   Mr. Muriel Acosta  has a past medical history of Diabetes ([address] GERD (gastroesophageal reflux disease); and Hypertension. He also has no past medical history of Difficult intubation; Malignant hyperthermia due to anesthesia; Nausea & vomiting; or Pseudocholinesterase deficiency. Mr. Deberah Meigs  has a past surgical history that includes colonoscopy; endoscopy; and orthopaedic (Left). Social History/Living Environment:     Pt lives alone in one story home with 2 to 4 step to enter. Prior Level of Function/Work/Activity:  Pt enjoys gardening, yard work, singing/ music  Dominant Side:         RIGHT  Current Medications:    Current Outpatient Prescriptions:     HYDROcodone-acetaminophen (NORCO) 5-325 mg per tablet, Take 1-2 Tabs by mouth every six (6) hours as needed for Pain. Max Daily Amount: 8 Tabs., Disp: 50 Tab, Rfl: 0    amLODIPine (NORVASC) 2.5 mg tablet, , Disp: , Rfl:     metFORMIN (GLUCOPHAGE) 500 mg tablet, , Disp: , Rfl:     omeprazole (PRILOSEC) 40 mg capsule, , Disp: , Rfl:     triamcinolone acetonide (KENALOG) 0.1 % topical cream, , Disp: , Rfl:     zolpidem (AMBIEN) 5 mg tablet, Take  by mouth nightly as needed for Sleep., Disp: , Rfl:     sucralfate (CARAFATE) 1 gram tablet, Take 1 g by mouth four (4) times daily. , Disp: , Rfl:    Date Last Reviewed:  3/17/2017 pt brought list in chart--see paper copy in chart   EXAMINATION:   Observation/Orthostatic Postural Assessment:          Pt with noted edema in R knee complex- soft and boggy feel. In standing pt with noted thoracic kyphosis/ forward head.     Palpation:          Palpable tenderness in medial R knee (along hamstring insertion region)  ROM:  BUE WFL    BLE WFL with knee ROM as follows:  R knee:  0 to 108 °  L knee 0 to 115 °    3/6/17  R knee 0-120 °    3/10/17 (PN)  R knee  0-125 ° AAROM 03/17/17  R knee AROM 0-129 deg       Strength:     Date:  02/22/17 Date: (PN)  3/10/17 Date:     LE MMT Left Right Left Right Left Right   Hip Flex (L1/L2) 4+/5 4/5       Hip Abd (glut med) 4/5 4/5       Hip Ext 4/5 4/5       Quad    ( L3/4) 4/5 2+/5**  4/5 Hamstring 4/5 2+/5**  4/5     Anterior Tib (L4/L5) WFL WFL       Gastroc (S1/2) WFL WFL       EHL (L5)                           ** indicates limited strength based on limited ROM  Special Tests: deferred  Neurological Screen:        Sensation: Intact for light touch  Functional Mobility:         Gait/Ambulation:  Pt amb. without A.D however decreased heel strike RLE and decreased R knee flexion in swing phase of gait pattern. Transfers:  Pt demonstrates sit to stand to sit from standard seat without use of UE. Bed Mobility:  I with bed mobility        Stairs:  Pt demonstrates step to gait pattern for up and down stairs relying heavily on handrail. Balance:          Single leg stance:  R) 2 sec; L) 7 sec   Body Structures Involved:  1. Bones  2. Joints  3. Muscles  4. Ligaments Body Functions Affected:  1. Sensory/Pain  2. Neuromusculoskeletal  3. Movement Related Activities and Participation Affected:  1. General Tasks and Demands  2. Mobility  3. Self Care  4. Domestic Life  5. Community, Social and Civic Life   CLINICAL DECISION MAKING:   Outcome Measure: Tool Used: Lower Extremity Functional Scale (LEFS)  Score:  Initial: 40/80 Most Recent: 55/80 (Date: 3/10/17)   Interpretation of Score: 20 questions each scored on a 5 point scale with 0 representing \"extreme difficulty or unable to perform\" and 4 representing \"no difficulty\". The lower the score, the greater the functional disability. 80/80 represents no disability. Minimal detectable change is 9 points. Score 80 79-63 62-48 47-32 31-16 15-1 0   Modifier CH CI CJ CK CL CM CN     ?  Mobility - Walking and Moving Around:     - CURRENT STATUS: CJ - 20%-39% impaired, limited or restricted    - GOAL STATUS: CI - 1%-19% impaired, limited or restricted    - D/C STATUS:  ---------------To be determined---------------    Medical Necessity:   · Patient is expected to demonstrate progress in strength, range of motion and balance to increase independence with functional mobility safely. Reason for Services/Other Comments:  · Patient continues to require modification of therapeutic interventions to increase complexity of exercises. TREATMENT:   (In addition to Assessment/Re-Assessment sessions the following treatments were rendered)      Therapeutic Exercise: (see flow sheet below for minutes):  Exercises per grid below to improve mobility, strength and balance. Required minimal visual and verbal cues to promote proper body alignment and promote proper body mechanics. Progressed resistance, range and repetitions as indicated. Aquatic Therapy (see flow sheet below for minutes): Aquatic treatment performed per flow grid for Decreased muscle strength, Decreased static/dynamic balance and reactive control, Decreased range of motion and Ease of movement. Cues provided for technique. Assistance by therapist provided for progression of activities. Patient has difficulty with R knee pain. Date: 02/22/17 2/23/17 02/27/17 03/01/17 3/3/17 3/6/17 3/8/17 3/10/17  (PN) 3/12/17 03/15/17 03/17/17   Modalities: 12 min 12 min            IFC with ice  To R knee in long sitting To R knee long sitting                                        Manual Therapy:  12 minutes            Soft tissue mobilization distal quad/hamstrings, ITB, proximal gastroc  5 min            Scar mobilization  5 min            Patella mobilizations  2x20 each direction            Aquatic Exercise:  1.5#/  40 min  60 mins  2.5# 60 min 2.5# 2.5# 55 min 2.5# 60 min 3.75# 60 min 2.5#  45 min 3.75# 55 min      Gait F/B/S/M  x4 Laps forward, backward  x2 L marches and side steps x4L ea x4L each x4L x4L closed paddles x4L closed paddles 4L x4L closed paddles     Heel/Toe   x20 raises x30 x2L x2L ea x2L ea x2L ea  x2L ea     4-way  x15 BLE x20 B x20 BLE          PF/DF  x20            Hamstring Curls  x15 BLE x2L x4L x4L x4L x4L 4L x4L     Hip Flexion with knee ext.   (rockette)   x20 x20 BLE    2L x2L     Squats    x20 x30 x20 BLE x20H3 x20 H3 Single leg x15 X15 Single Leg x20 single leg     SLR march    x4L x4L x4L x4L 4L x4L     Lunges    x20 BLE x4L x4L x4L 4L x4L     Hip circles   Fig 4 x10 H5 x20 BLE          Hip horizontal abduction/adduction              Core:                Core:              Deep well bike   7 mins 3 min 3 min 4 min 4 min 5 min 4 min     Deep well jumping david   2min 2 min 2 min 2 min 2 min 2 min 2 min     Deep well scissors   2min 2 min 2 min 2 min 2 min 2 min 2 min     Deep well:  traction   6 min           Step ups       x15 BLE Stairs 4 steps x4 in pool 4x recip gait 4x recip gait     Hamstring Stretch  3 H 30 2xH30 B 2H30 BLE 2h30 2H30  2H30 B LE      Step Lunge  x10 H 3-5 R knee x10H5 B 10H5 BLE 10H5 10H5  2H30 B LE x10H5 BLE     Piriformis stretch              PF Stretch  3 H 30 off edge of steps            Balance: Single leg Stance    2H30 BLE          Balance: Tandem    2H30 BLE  x2L gait x2L  x4L     Step ups    x10 BLE   VMO step down 2x10  VMO step down 2x15                     Therapeutic Exercise: 5  min         50 mins  60 mins   Quad sets X15 BLE             Heel slides X15 BLE          x10   LAQ X15 BLE at EOB         3# X20 b 3# 2x10 B   Mini-Squat          x20    4-way hip          Red TB x20B extn red TB   Hip abd - side stepping           Red x15 ft c close SBA   Marching for hip flexion          3# x20 B Seated 3$ x20   Calf raises          x20 x20   slantboard stretch           1 min   HSC          3# X20 B grn TB seated 2x10 B   Bridge c add ball sqz          x20 x20   Bridge c abd tb hold          Red TB x20 Red x20   Sit to stand          unable At table c cushion x10   Step 4\" and 6'          x15 B ea    Step ups onto Bosu           x10 B   Reciprocating stairs           1 flight (18 stairs)   Bike           10 mins 10 mins   F=forward  B=Backward  S=sidesteps  M=marches    H=hold    Manual: (see flow sheet above for minutes)   Modalities: (see flow sheet above for minutes)     HEP: Pt instructed to continue HEP per home health intervention. Treatment/Session Assessment: . Pt was able to transition from sit-to-stand from elevated chair, c hands on knees, he noted B LE fatigue after 10 reps. Pt was able to use reciprocating gait pattern to ascend/descend 1 flight of 18 stairs, using single handrail, however used UE to assist in ascending. Pt will benefit from continue TherEx with focus on LE strengthening.  ist.  Pt was instructed with and performed TherEx per flow sheet above without exacerbation of knee pain. Verbal cuing to assure proper execution and body mechanics. · Pain/ Symptoms: Initial:   0/10 R knee  Pt reports no significant changes concerning R knee. Says has felt \"good\" after previous treatment session. Cate [facility] Post Session:  0/10  ·     · Compliance with Program/Exercises: Will assess as treatment progresses. · Recommendations/Intent for next treatment session: \"Next visit will focus on advancements to more challenging activities\". Continue with TherEx tx session, maximizing effort to improve functional strength, flexibility and mobility, prior to discharge.      Total Treatment Duration:   PT Patient Time In/Time Out  Time In: 1015  Time Out: 1500 [facility]

## 2017-03-20 ENCOUNTER — HOSPITAL ENCOUNTER (OUTPATIENT)
Dept: PHYSICAL THERAPY | Age: 77
Discharge: HOME | End: 2017-03-20
Payer: MEDICARE

## 2017-03-20 PROCEDURE — 97110 THERAPEUTIC EXERCISES: CPT

## 2017-03-20 NOTE — PROGRESS NOTES
Stan Merino  : 1940 [address] @ [place] [address].  [phone]   [phone]        OUTPATIENT PHYSICAL THERAPY:Daily Note 3/20/2017      ICD-10: Treatment Diagnosis: Difficulty in walking, not elsewhere classified (R26.2)  Pain in right knee (M25.561)  Precautions/Allergies:   Prednisone   Fall Risk Score: 2 (? 5 = High Risk)  MD Orders: Evaluate and Treat MEDICAL/REFERRING DIAGNOSIS:  Status post total right knee replacement [Z96.651]   DATE OF ONSET: 17  REFERRING PHYSICIAN: Robbin Cee MD  RETURN PHYSICIAN APPOINTMENT: 17     INITIAL ASSESSMENT:  Mr. Ama Cameron is a 76year old white male seen for physical therapy per MD orders with complaint of right knee pain following R TKA. Pt evaluated for outpatient physical therapy today with the following deficits: right knee pain, decreased ROM/ strength R knee complex, decreased static, dynamic standing balance, antalgic gait. Pt would benefit from physical therapy to address the above deficits in order to return to increased independence with functional mobility with decreased pain. Pt would benefit from initially working in aquatic setting to off load R knee while addressing goals. As patient progresses, plan to transition to gravity challenging, land based exercises/ activities. Plan of care and goals reviewed with patient who verbalizes agreement. PROBLEM LIST (Impacting functional limitations):  1. Decreased Strength  2. Decreased ADL/Functional Activities  3. Decreased Ambulation Ability/Technique  4. Decreased Balance  5. Increased Pain  6. Decreased Flexibility/Joint Mobility  7. Edema/Girth INTERVENTIONS PLANNED:  1. Balance Exercise  2. Gait Training  3. Home Exercise Program (HEP)  4. Manual Therapy  5. Range of Motion (ROM)  6. Therapeutic Activites  7. Therapeutic Exercise/Strengthening  8. aquatics   9.  Modalities   TREATMENT PLAN:  Effective Dates: 02/22/17 TO 05/17/17. Frequency/Duration: 2- 3 times a week for 12 weeks    GOALS: (Goals have been discussed and agreed upon with patient.)  Short-Term Functional Goals: Time Frame: 2 weeks  Patient will demonstrate independence and compliance with home exercise program for management of symptoms. (MET)  Pt will demonstrate increase in Lower Extremity Functional Scale by 3-4  points for improved functional mobility. (MET 3/10/17)  Pt will demonstrate increased active range of motion for right knee  from 0 ° (extension) to 115 ° (flexion). (MET)  Pt will tolerate 35 to 40 minutes of aquatic therapy with pain of 2-3 /10 following intervention.  (MET)  Discharge Goals: Time Frame: 6 weeks  Pt will report Lower Extremity Functional Scale score of 60/80 for improved function. (Progressing 3/10/17)  Pt will demonstrate active range of motion of R knee from 0 ° (extension) to 125 °(flexion). (Met 3/10/17)  Pt will ambulate independently >/= 1500 feet with normal gait pattern with even stance time/ step length in bilateral lower extremities for safe community entry for grocery shopping. (progressing 3/10/17)  Pt will demonstrate reciprocal gait up/ down 12 steps independently with use of unilateral handrail. (progressing 3/10/17)    Rehabilitation Potential For Stated Goals: Good                    HISTORY:   History of Present Injury/Illness (Reason for Referral):  Pt is 76year old white male seen for physical therapy following R TKA 01/23/17. Prior to that most recent surgery pt had L TKA 10/28/16 with rehab as well as L1 kyphoplasty 01/13/17. Pt reports minimal pain throughout the day, reporting that most pain noted with stiffness when he doesn't move. Pt enjoys yard work, gardening and performing at nursing homes. Pt reports his goal is to get back to these activities.     Past Medical History/Comorbidities:   Mr. Manuel Welch  has a past medical history of Diabetes ([address] GERD (gastroesophageal reflux disease); and Hypertension. He also has no past medical history of Difficult intubation; Malignant hyperthermia due to anesthesia; Nausea & vomiting; or Pseudocholinesterase deficiency. Mr. Laci Benito  has a past surgical history that includes colonoscopy; endoscopy; and orthopaedic (Left). Social History/Living Environment:     Pt lives alone in one story home with 2 to 4 step to enter. Prior Level of Function/Work/Activity:  Pt enjoys gardening, yard work, singing/ music  Dominant Side:         RIGHT  Current Medications:    Current Outpatient Prescriptions:     HYDROcodone-acetaminophen (NORCO) 5-325 mg per tablet, Take 1-2 Tabs by mouth every six (6) hours as needed for Pain. Max Daily Amount: 8 Tabs., Disp: 50 Tab, Rfl: 0    amLODIPine (NORVASC) 2.5 mg tablet, , Disp: , Rfl:     metFORMIN (GLUCOPHAGE) 500 mg tablet, , Disp: , Rfl:     omeprazole (PRILOSEC) 40 mg capsule, , Disp: , Rfl:     triamcinolone acetonide (KENALOG) 0.1 % topical cream, , Disp: , Rfl:     zolpidem (AMBIEN) 5 mg tablet, Take  by mouth nightly as needed for Sleep., Disp: , Rfl:     sucralfate (CARAFATE) 1 gram tablet, Take 1 g by mouth four (4) times daily. , Disp: , Rfl:    Date Last Reviewed:  3/20/2017 pt brought list in chart--see paper copy in chart   EXAMINATION:   Observation/Orthostatic Postural Assessment:          Pt with noted edema in R knee complex- soft and boggy feel. In standing pt with noted thoracic kyphosis/ forward head.     Palpation:          Palpable tenderness in medial R knee (along hamstring insertion region)  ROM:  BUE WFL    BLE WFL with knee ROM as follows:  R knee:  0 to 108 °  L knee 0 to 115 °    3/6/17  R knee 0-120 °    3/10/17 (PN)  R knee  0-125 ° AAROM 03/17/17  R knee AROM 0-129 deg       Strength:     Date:  02/22/17 Date: (PN)  3/10/17 Date:     LE MMT Left Right Left Right Left Right   Hip Flex (L1/L2) 4+/5 4/5       Hip Abd (glut med) 4/5 4/5       Hip Ext 4/5 4/5       Quad    ( L3/4) 4/5 2+/5**  4/5 Hamstring 4/5 2+/5**  4/5     Anterior Tib (L4/L5) WFL WFL       Gastroc (S1/2) WFL WFL       EHL (L5)                           ** indicates limited strength based on limited ROM  Special Tests: deferred  Neurological Screen:        Sensation: Intact for light touch  Functional Mobility:         Gait/Ambulation:  Pt amb. without A.D however decreased heel strike RLE and decreased R knee flexion in swing phase of gait pattern. Transfers:  Pt demonstrates sit to stand to sit from standard seat without use of UE. Bed Mobility:  I with bed mobility        Stairs:  Pt demonstrates step to gait pattern for up and down stairs relying heavily on handrail. Balance:          Single leg stance:  R) 2 sec; L) 7 sec   Body Structures Involved:  1. Bones  2. Joints  3. Muscles  4. Ligaments Body Functions Affected:  1. Sensory/Pain  2. Neuromusculoskeletal  3. Movement Related Activities and Participation Affected:  1. General Tasks and Demands  2. Mobility  3. Self Care  4. Domestic Life  5. Community, Social and Civic Life   CLINICAL DECISION MAKING:   Outcome Measure: Tool Used: Lower Extremity Functional Scale (LEFS)  Score:  Initial: 40/80 Most Recent: 55/80 (Date: 3/10/17)   Interpretation of Score: 20 questions each scored on a 5 point scale with 0 representing \"extreme difficulty or unable to perform\" and 4 representing \"no difficulty\". The lower the score, the greater the functional disability. 80/80 represents no disability. Minimal detectable change is 9 points. Score 80 79-63 62-48 47-32 31-16 15-1 0   Modifier CH CI CJ CK CL CM CN     ?  Mobility - Walking and Moving Around:     - CURRENT STATUS: CJ - 20%-39% impaired, limited or restricted    - GOAL STATUS: CI - 1%-19% impaired, limited or restricted    - D/C STATUS:  ---------------To be determined---------------    Medical Necessity:   · Patient is expected to demonstrate progress in strength, range of motion and balance to increase independence with functional mobility safely. Reason for Services/Other Comments:  · Patient continues to require modification of therapeutic interventions to increase complexity of exercises. TREATMENT:   (In addition to Assessment/Re-Assessment sessions the following treatments were rendered)      Therapeutic Exercise: (see flow sheet below for minutes):  Exercises per grid below to improve mobility, strength and balance. Required minimal visual and verbal cues to promote proper body alignment and promote proper body mechanics. Progressed resistance, range and repetitions as indicated. Aquatic Therapy (see flow sheet below for minutes): Aquatic treatment performed per flow grid for Decreased muscle strength, Decreased static/dynamic balance and reactive control, Decreased range of motion and Ease of movement. Cues provided for technique. Assistance by therapist provided for progression of activities. Patient has difficulty with R knee pain.      Date: 02/22/17 2/23/17 02/27/17 03/01/17 3/3/17 3/6/17 3/8/17 3/10/17  (PN) 3/12/17 03/15/17 03/17/17 03/20/17     Modalities: 12 min 12 min               IFC with ice  To R knee in long sitting To R knee long sitting                                                 Manual Therapy:  12 minutes               Soft tissue mobilization distal quad/hamstrings, ITB, proximal gastroc  5 min               Scar mobilization  5 min               Patella mobilizations  2x20 each direction               Aquatic Exercise:  1.5#/  40 min  60 mins  2.5# 60 min 2.5# 2.5# 55 min 2.5# 60 min 3.75# 60 min 2.5#  45 min 3.75# 55 min         Gait F/B/S/M  x4 Laps forward, backward  x2 L marches and side steps x4L ea x4L each x4L x4L closed paddles x4L closed paddles 4L x4L closed paddles        Heel/Toe   x20 raises x30 x2L x2L ea x2L ea x2L ea  x2L ea        4-way  x15 BLE x20 B x20 BLE             PF/DF  x20               Hamstring Curls  x15 BLE x2L x4L x4L x4L x4L 4L x4L Hip Flexion with knee ext.   (rockette)   x20 x20 BLE    2L x2L        Squats    x20 x30 x20 BLE x20H3 x20 H3 Single leg x15 X15 Single Leg x20 single leg        SLR march    x4L x4L x4L x4L 4L x4L        Lunges    x20 BLE x4L x4L x4L 4L x4L        Hip circles   Fig 4 x10 H5 x20 BLE             Hip horizontal abduction/adduction                 Core:                   Core:                 Deep well bike   7 mins 3 min 3 min 4 min 4 min 5 min 4 min        Deep well jumping david   2min 2 min 2 min 2 min 2 min 2 min 2 min        Deep well scissors   2min 2 min 2 min 2 min 2 min 2 min 2 min        Deep well:  traction   6 min              Step ups       x15 BLE Stairs 4 steps x4 in pool 4x recip gait 4x recip gait        Hamstring Stretch  3 H 30 2xH30 B 2H30 BLE 2h30 2H30  2H30 B LE         Step Lunge  x10 H 3-5 R knee x10H5 B 10H5 BLE 10H5 10H5  2H30 B LE x10H5 BLE        Piriformis stretch                 PF Stretch  3 H 30 off edge of steps               Balance: Single leg Stance    2H30 BLE             Balance: Tandem    2H30 BLE  x2L gait x2L  x4L        Step ups    x10 BLE   VMO step down 2x10  VMO step down 2x15                           Therapeutic Exercise: 5  min         50 mins  60 mins  50  mins     Quad sets X15 BLE                Heel slides X15 BLE          x10      LAQ X15 BLE at EOB         3# X20 b 3# 2x10 B 3# 2x10 B     LAQ c turnout            3# 2x10 B     Mini-Squat          x20       4-way hip          Red TB x20B extn red TB      Hip abd - side stepping           Red x15 ft c close SBA Shawanda TB  x5ft x6     Marching for hip flexion          3# x20 B Seated 3$ x20      Calf raises          x20 x20 x20     slantboard stretch           1 min 1 min     HSC          3# X20 B grn TB seated 2x10 B grn TB 2x10B     Bridge c add ball sqz          x20 x20 x20     Bridge c abd tb hold          Red TB x20 Red x20 x20     Sit to stand          unable At table c cushion x10 Hi-lo table 2x10     Step 4\" and 6'          x15 B ea  Lateral up &   x20     Step ups onto Bosu           x10 B x15 B     Reciprocating stairs           1 flight (18 stairs)      Bike           10 mins 10 mins 7 mins     F=forward  B=Backward  S=sidesteps  M=marches    H=hold    Manual: (see flow sheet above for minutes)   Modalities: (see flow sheet above for minutes)     HEP: Pt instructed to continue HEP per home health intervention. Treatment/Session Assessment: . Pt continues to struggle with sit to stand from lower chairs without UE assist.  Quad mm fatigue quickly with light resistance strengthening. Pt uses UE to pull up with 6\" forward and lateral step-ups. Pt will benefit from continue TherEx with focus on LE strengthening. Pt was instructed with and performed TherEx per flow sheet above without exacerbation of knee pain. Verbal cuing to assure proper execution and body mechanics. · Pain/ Symptoms: Initial:   0/10 R knee  Pt reports no significant changes concerning R knee. Pt is able to perform ADLs without knee pain. Pt is pleased with progress at this time. Post Session:  0/10  ·     · Compliance with Program/Exercises: Will assess as treatment progresses. · Recommendations/Intent for next treatment session: \"Next visit will focus on advancements to more challenging activities\". Continue with TherEx tx session, maximizing effort to improve functional strength, flexibility and mobility, prior to discharge.      Total Treatment Duration:   PT Patient Time In/Time Out  Time In: 1015  Time Out: [address] [facility]

## 2017-03-22 ENCOUNTER — HOSPITAL ENCOUNTER (OUTPATIENT)
Dept: PHYSICAL THERAPY | Age: 77
Discharge: HOME | End: 2017-03-22
Payer: MEDICARE

## 2017-03-22 PROCEDURE — 97110 THERAPEUTIC EXERCISES: CPT

## 2017-03-22 NOTE — PROGRESS NOTES
Freddy Garcia  : 1940 [address] @ [address]  [address] [address].  [phone]   [phone]        OUTPATIENT PHYSICAL THERAPY:Daily Note 3/22/2017      ICD-10: Treatment Diagnosis: Difficulty in walking, not elsewhere classified (R26.2)  Pain in right knee (M25.561)  Precautions/Allergies:   Prednisone   Fall Risk Score: 2 (? 5 = High Risk)  MD Orders: Evaluate and Treat MEDICAL/REFERRING DIAGNOSIS:  Status post total right knee replacement [Z96.651]   DATE OF ONSET: 17  REFERRING PHYSICIAN: Tricia Perez MD  RETURN PHYSICIAN APPOINTMENT: 17     INITIAL ASSESSMENT:  Mr. Mona Healy is a 76year old white male seen for physical therapy per MD orders with complaint of right knee pain following R TKA. Pt evaluated for outpatient physical therapy today with the following deficits: right knee pain, decreased ROM/ strength R knee complex, decreased static, dynamic standing balance, antalgic gait. Pt would benefit from physical therapy to address the above deficits in order to return to increased independence with functional mobility with decreased pain. Pt would benefit from initially working in aquatic setting to off load R knee while addressing goals. As patient progresses, plan to transition to gravity challenging, land based exercises/ activities. Plan of care and goals reviewed with patient who verbalizes agreement. PROBLEM LIST (Impacting functional limitations):  1. Decreased Strength  2. Decreased ADL/Functional Activities  3. Decreased Ambulation Ability/Technique  4. Decreased Balance  5. Increased Pain  6. Decreased Flexibility/Joint Mobility  7. Edema/Girth INTERVENTIONS PLANNED:  1. Balance Exercise  2. Gait Training  3. Home Exercise Program (HEP)  4. Manual Therapy  5. Range of Motion (ROM)  6. Therapeutic Activites  7. Therapeutic Exercise/Strengthening  8. aquatics   9.  Modalities   TREATMENT PLAN:  Effective Dates: 02/22/17 TO 05/17/17. Frequency/Duration: 2- 3 times a week for 12 weeks    GOALS: (Goals have been discussed and agreed upon with patient.)  Short-Term Functional Goals: Time Frame: 2 weeks  Patient will demonstrate independence and compliance with home exercise program for management of symptoms. (MET)  Pt will demonstrate increase in Lower Extremity Functional Scale by 3-4  points for improved functional mobility. (MET 3/10/17)  Pt will demonstrate increased active range of motion for right knee  from 0 ° (extension) to 115 ° (flexion). (MET)  Pt will tolerate 35 to 40 minutes of aquatic therapy with pain of 2-3 /10 following intervention.  (MET)  Discharge Goals: Time Frame: 6 weeks  Pt will report Lower Extremity Functional Scale score of 60/80 for improved function. (Progressing 3/10/17)  Pt will demonstrate active range of motion of R knee from 0 ° (extension) to 125 °(flexion). (Met 3/10/17)  Pt will ambulate independently >/= 1500 feet with normal gait pattern with even stance time/ step length in bilateral lower extremities for safe community entry for grocery shopping. (progressing 3/10/17)  Pt will demonstrate reciprocal gait up/ down 12 steps independently with use of unilateral handrail. (progressing 3/10/17)    Rehabilitation Potential For Stated Goals: Good                    HISTORY:   History of Present Injury/Illness (Reason for Referral):  Pt is 76year old white male seen for physical therapy following R TKA 01/23/17. Prior to that most recent surgery pt had L TKA 10/28/16 with rehab as well as L1 kyphoplasty 01/13/17. Pt reports minimal pain throughout the day, reporting that most pain noted with stiffness when he doesn't move. Pt enjoys yard work, gardening and performing at nursing homes. Pt reports his goal is to get back to these activities.     Past Medical History/Comorbidities:   Mr. Shakira Wang  has a past medical history of Diabetes ([address] GERD (gastroesophageal reflux disease); and Hypertension. He also has no past medical history of Difficult intubation; Malignant hyperthermia due to anesthesia; Nausea & vomiting; or Pseudocholinesterase deficiency. Mr. Mona Healy  has a past surgical history that includes colonoscopy; endoscopy; and orthopaedic (Left). Social History/Living Environment:     Pt lives alone in one story home with 2 to 4 step to enter. Prior Level of Function/Work/Activity:  Pt enjoys gardening, yard work, singing/ music  Dominant Side:         RIGHT  Current Medications:    Current Outpatient Prescriptions:     HYDROcodone-acetaminophen (NORCO) 5-325 mg per tablet, Take 1-2 Tabs by mouth every six (6) hours as needed for Pain. Max Daily Amount: 8 Tabs., Disp: 50 Tab, Rfl: 0    amLODIPine (NORVASC) 2.5 mg tablet, , Disp: , Rfl:     metFORMIN (GLUCOPHAGE) 500 mg tablet, , Disp: , Rfl:     omeprazole (PRILOSEC) 40 mg capsule, , Disp: , Rfl:     triamcinolone acetonide (KENALOG) 0.1 % topical cream, , Disp: , Rfl:     zolpidem (AMBIEN) 5 mg tablet, Take  by mouth nightly as needed for Sleep., Disp: , Rfl:     sucralfate (CARAFATE) 1 gram tablet, Take 1 g by mouth four (4) times daily. , Disp: , Rfl:    Date Last Reviewed:  3/22/2017 pt brought list in chart--see paper copy in chart   EXAMINATION:   Observation/Orthostatic Postural Assessment:          Pt with noted edema in R knee complex- soft and boggy feel. In standing pt with noted thoracic kyphosis/ forward head.     Palpation:          Palpable tenderness in medial R knee (along hamstring insertion region)  ROM:  BUE WFL    BLE WFL with knee ROM as follows:  R knee:  0 to 108 °  L knee 0 to 115 °    3/6/17  R knee 0-120 °    3/10/17 (PN)  R knee  0-125 ° AAROM 03/17/17  R knee AROM 0-129 deg       Strength:     Date:  02/22/17 Date: (PN)  3/10/17 Date:     LE MMT Left Right Left Right Left Right   Hip Flex (L1/L2) 4+/5 4/5       Hip Abd (glut med) 4/5 4/5       Hip Ext 4/5 4/5       Quad    ( L3/4) 4/5 2+/5**  4/5 Hamstring 4/5 2+/5**  4/5     Anterior Tib (L4/L5) WFL WFL       Gastroc (S1/2) WFL WFL       EHL (L5)                           ** indicates limited strength based on limited ROM  Special Tests: deferred  Neurological Screen:        Sensation: Intact for light touch  Functional Mobility:         Gait/Ambulation:  Pt amb. without A.D however decreased heel strike RLE and decreased R knee flexion in swing phase of gait pattern. Transfers:  Pt demonstrates sit to stand to sit from standard seat without use of UE. Bed Mobility:  I with bed mobility        Stairs:  Pt demonstrates step to gait pattern for up and down stairs relying heavily on handrail. Balance:          Single leg stance:  R) 2 sec; L) 7 sec   Body Structures Involved:  1. Bones  2. Joints  3. Muscles  4. Ligaments Body Functions Affected:  1. Sensory/Pain  2. Neuromusculoskeletal  3. Movement Related Activities and Participation Affected:  1. General Tasks and Demands  2. Mobility  3. Self Care  4. Domestic Life  5. Community, Social and Civic Life   CLINICAL DECISION MAKING:   Outcome Measure: Tool Used: Lower Extremity Functional Scale (LEFS)  Score:  Initial: 40/80 Most Recent: 55/80 (Date: 3/10/17)   Interpretation of Score: 20 questions each scored on a 5 point scale with 0 representing \"extreme difficulty or unable to perform\" and 4 representing \"no difficulty\". The lower the score, the greater the functional disability. 80/80 represents no disability. Minimal detectable change is 9 points. Score 80 79-63 62-48 47-32 31-16 15-1 0   Modifier CH CI CJ CK CL CM CN     ?  Mobility - Walking and Moving Around:     - CURRENT STATUS: CJ - 20%-39% impaired, limited or restricted    - GOAL STATUS: CI - 1%-19% impaired, limited or restricted    - D/C STATUS:  ---------------To be determined---------------    Medical Necessity:   · Patient is expected to demonstrate progress in strength, range of motion and balance to increase independence with functional mobility safely. Reason for Services/Other Comments:  · Patient continues to require modification of therapeutic interventions to increase complexity of exercises. TREATMENT:   (In addition to Assessment/Re-Assessment sessions the following treatments were rendered)      Therapeutic Exercise: (see flow sheet below for minutes):  Exercises per grid below to improve mobility, strength and balance. Required minimal visual and verbal cues to promote proper body alignment and promote proper body mechanics. Progressed resistance, range and repetitions as indicated. Aquatic Therapy (see flow sheet below for minutes): Aquatic treatment performed per flow grid for Decreased muscle strength, Decreased static/dynamic balance and reactive control, Decreased range of motion and Ease of movement. Cues provided for technique. Assistance by therapist provided for progression of activities. Patient has difficulty with R knee pain.      Date: 02/22/17 2/23/17 02/27/17 03/01/17 3/3/17 3/6/17 3/8/17 3/10/17  (PN) 3/12/17 03/15/17 03/17/17 03/20/17 03/22/17    Modalities: 12 min 12 min               IFC with ice  To R knee in long sitting To R knee long sitting                                                 Manual Therapy:  12 minutes               Soft tissue mobilization distal quad/hamstrings, ITB, proximal gastroc  5 min               Scar mobilization  5 min               Patella mobilizations  2x20 each direction               Aquatic Exercise:  1.5#/  40 min  60 mins  2.5# 60 min 2.5# 2.5# 55 min 2.5# 60 min 3.75# 60 min 2.5#  45 min 3.75# 55 min         Gait F/B/S/M  x4 Laps forward, backward  x2 L marches and side steps x4L ea x4L each x4L x4L closed paddles x4L closed paddles 4L x4L closed paddles        Heel/Toe   x20 raises x30 x2L x2L ea x2L ea x2L ea  x2L ea        4-way  x15 BLE x20 B x20 BLE             PF/DF  x20               Hamstring Curls  x15 BLE x2L x4L x4L x4L x4L 4L x4L        Hip Flexion with knee ext.   (rockette)   x20 x20 BLE    2L x2L        Squats    x20 x30 x20 BLE x20H3 x20 H3 Single leg x15 X15 Single Leg x20 single leg        SLR march    x4L x4L x4L x4L 4L x4L        Lunges    x20 BLE x4L x4L x4L 4L x4L        Hip circles   Fig 4 x10 H5 x20 BLE             Hip horizontal abduction/adduction                 Core:                   Core:                 Deep well bike   7 mins 3 min 3 min 4 min 4 min 5 min 4 min        Deep well jumping david   2min 2 min 2 min 2 min 2 min 2 min 2 min        Deep well scissors   2min 2 min 2 min 2 min 2 min 2 min 2 min        Deep well:  traction   6 min              Step ups       x15 BLE Stairs 4 steps x4 in pool 4x recip gait 4x recip gait        Hamstring Stretch  3 H 30 2xH30 B 2H30 BLE 2h30 2H30  2H30 B LE         Step Lunge  x10 H 3-5 R knee x10H5 B 10H5 BLE 10H5 10H5  2H30 B LE x10H5 BLE        Piriformis stretch                 PF Stretch  3 H 30 off edge of steps               Balance: Single leg Stance    2H30 BLE             Balance: Tandem    2H30 BLE  x2L gait x2L  x4L        Step ups    x10 BLE   VMO step down 2x10  VMO step down 2x15                           Therapeutic Exercise: 5  min         50 mins  60 mins  50  mins 50 mins    Quad sets X15 BLE                Heel slides X15 BLE          x10      LAQ X15 BLE at EOB         3# X20 b 3# 2x10 B 3# 2x10 B 4# 2x10 B    LAQ c turnout            3# 2x10 B 4# 2x10 B    Mini-Squat          x20       4-way hip          Red TB x20B extn red TB  extn red x20    Hip abd - side stepping           Red x15 ft c close SBA Shawanda TB  x5ft x6 Red TB 4x20 ft    Marching for hip flexion          3# x20 B Seated 3$ x20  SLR 3# 2x10 B    Calf raises          x20 x20 x20 x20    slantboard stretch           1 min 1 min 1 min    HSC          3# X20 B grn TB seated 2x10 B grn TB 2x10B Blue TB x20    Bridge c add ball sqz          x20 x20 x20 x20    Bridge c abd tb hold          Red TB x20 Red x20 x20 x20    Sit to stand          unable At table c cushion x10 Hi-lo table 2x10 Low mat table c 1 cushion 2x10    Step 4\" and 6'          x15 B ea  Lateral up &   x20 1 riser 2x10 B    Step ups onto Bosu           x10 B x15 B 2x10 B    Reciprocating stairs           1 flight (18 stairs)      Bike           10 mins 10 mins 7 mins 6 mins    F=forward  B=Backward  S=sidesteps  M=marches    H=hold    Manual: (see flow sheet above for minutes)   Modalities: (see flow sheet above for minutes)     HEP: Pt instructed to continue HEP per home health intervention. Treatment/Session Assessment: . Pt continues to struggle with sit to stand from lower chairs without UE assist.  Quad mm fatigue quickly with light resistance strengthening. Pt demonstrates mild to moderate difficulty ascending stairs with reciprocating pattern due to R LE weakness. Pt puts forth good effort with all TherEx activities. He reports compliance with HEP, including sit to stand. Pt will benefit from continue TherEx with focus on LE strengthening. · Pain/ Symptoms: Initial:   0/10 R knee  Pt denies knee pain at time of treatment today. Pt primary complaint of continued weakness with R>LE mm. Post Session:  0/10  ·     · Compliance with Program/Exercises: Will assess as treatment progresses. · Recommendations/Intent for next treatment session: \"Next visit will focus on advancements to more challenging activities\". Continue with TherEx tx session, maximizing effort to improve functional strength, flexibility and mobility, prior to discharge.      Total Treatment Duration:   PT Patient Time In/Time Out  Time In: 1010  Time Out: [facility]

## 2017-03-24 ENCOUNTER — HOSPITAL ENCOUNTER (OUTPATIENT)
Dept: PHYSICAL THERAPY | Age: 77
Discharge: HOME | End: 2017-03-24
Payer: MEDICARE

## 2017-03-24 PROCEDURE — 97110 THERAPEUTIC EXERCISES: CPT

## 2017-03-24 NOTE — PROGRESS NOTES
Chidi Patiño  : 1940 [address] [facility] @ [address], [address] [place] [address]  [phone]   [phone]        OUTPATIENT PHYSICAL THERAPY:Daily Note 3/24/2017      ICD-10: Treatment Diagnosis: Difficulty in walking, not elsewhere classified (R26.2)  Pain in right knee (M25.561)  Precautions/Allergies:   Prednisone   Fall Risk Score: 2 (? 5 = High Risk)  MD Orders: Evaluate and Treat MEDICAL/REFERRING DIAGNOSIS:  Status post total right knee replacement [Z96.651]   DATE OF ONSET: 17  REFERRING PHYSICIAN: Renee Baker MD  RETURN PHYSICIAN APPOINTMENT: 17     INITIAL ASSESSMENT:  Mr. Laci Benito is a 76year old white male seen for physical therapy per MD orders with complaint of right knee pain following R TKA. Pt evaluated for outpatient physical therapy today with the following deficits: right knee pain, decreased ROM/ strength R knee complex, decreased static, dynamic standing balance, antalgic gait. Pt would benefit from physical therapy to address the above deficits in order to return to increased independence with functional mobility with decreased pain. Pt would benefit from initially working in aquatic setting to off load R knee while addressing goals. As patient progresses, plan to transition to gravity challenging, land based exercises/ activities. Plan of care and goals reviewed with patient who verbalizes agreement. PROBLEM LIST (Impacting functional limitations):  1. Decreased Strength  2. Decreased ADL/Functional Activities  3. Decreased Ambulation Ability/Technique  4. Decreased Balance  5. Increased Pain  6. Decreased Flexibility/Joint Mobility  7. Edema/Girth INTERVENTIONS PLANNED:  1. Balance Exercise  2. Gait Training  3. Home Exercise Program (HEP)  4. Manual Therapy  5. Range of Motion (ROM)  6. Therapeutic Activites  7. Therapeutic Exercise/Strengthening  8. aquatics   9.  Modalities   TREATMENT PLAN:  Effective Dates: 02/22/17 TO 05/17/17. Frequency/Duration: 2- 3 times a week for 12 weeks    GOALS: (Goals have been discussed and agreed upon with patient.)  Short-Term Functional Goals: Time Frame: 2 weeks  Patient will demonstrate independence and compliance with home exercise program for management of symptoms. (MET)  Pt will demonstrate increase in Lower Extremity Functional Scale by 3-4  points for improved functional mobility. (MET 3/10/17)  Pt will demonstrate increased active range of motion for right knee  from 0 ° (extension) to 115 ° (flexion). (MET)  Pt will tolerate 35 to 40 minutes of aquatic therapy with pain of 2-3 /10 following intervention.  (MET)  Discharge Goals: Time Frame: 6 weeks  Pt will report Lower Extremity Functional Scale score of 60/80 for improved function. (Progressing 3/10/17)  Pt will demonstrate active range of motion of R knee from 0 ° (extension) to 125 °(flexion). (Met 3/10/17)  Pt will ambulate independently >/= 1500 feet with normal gait pattern with even stance time/ step length in bilateral lower extremities for safe community entry for grocery shopping. (progressing 3/10/17)  Pt will demonstrate reciprocal gait up/ down 12 steps independently with use of unilateral handrail. (progressing 3/10/17)    Rehabilitation Potential For Stated Goals: Good                    HISTORY:   History of Present Injury/Illness (Reason for Referral):  Pt is 76year old white male seen for physical therapy following R TKA 01/23/17. Prior to that most recent surgery pt had L TKA 10/28/16 with rehab as well as L1 kyphoplasty 01/13/17. Pt reports minimal pain throughout the day, reporting that most pain noted with stiffness when he doesn't move. Pt enjoys yard work, gardening and performing at nursing homes. Pt reports his goal is to get back to these activities.     Past Medical History/Comorbidities:   Mr. Deberah Meigs  has a past medical history of Diabetes ([address] GERD (gastroesophageal reflux disease); and Hypertension. He also has no past medical history of Difficult intubation; Malignant hyperthermia due to anesthesia; Nausea & vomiting; or Pseudocholinesterase deficiency. Mr. Laci Benito  has a past surgical history that includes colonoscopy; endoscopy; and orthopaedic (Left). Social History/Living Environment:     Pt lives alone in one story home with 2 to 4 step to enter. Prior Level of Function/Work/Activity:  Pt enjoys gardening, yard work, singing/ music  Dominant Side:         RIGHT  Current Medications:    Current Outpatient Prescriptions:     HYDROcodone-acetaminophen (NORCO) 5-325 mg per tablet, Take 1-2 Tabs by mouth every six (6) hours as needed for Pain. Max Daily Amount: 8 Tabs., Disp: 50 Tab, Rfl: 0    amLODIPine (NORVASC) 2.5 mg tablet, , Disp: , Rfl:     metFORMIN (GLUCOPHAGE) 500 mg tablet, , Disp: , Rfl:     omeprazole (PRILOSEC) 40 mg capsule, , Disp: , Rfl:     triamcinolone acetonide (KENALOG) 0.1 % topical cream, , Disp: , Rfl:     zolpidem (AMBIEN) 5 mg tablet, Take  by mouth nightly as needed for Sleep., Disp: , Rfl:     sucralfate (CARAFATE) 1 gram tablet, Take 1 g by mouth four (4) times daily. , Disp: , Rfl:    Date Last Reviewed:  3/24/2017 pt brought list in chart--see paper copy in chart   EXAMINATION:   Observation/Orthostatic Postural Assessment:          Pt with noted edema in R knee complex- soft and boggy feel. In standing pt with noted thoracic kyphosis/ forward head.     Palpation:          Palpable tenderness in medial R knee (along hamstring insertion region)  ROM:  BUE WFL    BLE WFL with knee ROM as follows:  R knee:  0 to 108 °  L knee 0 to 115 °    3/6/17  R knee 0-120 °    3/10/17 (PN)  R knee  0-125 ° AAROM 03/17/17  R knee AROM 0-129 deg       Strength:     Date:  02/22/17 Date: (PN)  3/10/17 Date:     LE MMT Left Right Left Right Left Right   Hip Flex (L1/L2) 4+/5 4/5       Hip Abd (glut med) 4/5 4/5       Hip Ext 4/5 4/5       Quad    ( L3/4) 4/5 2+/5**  4/5 Hamstring 4/5 2+/5**  4/5     Anterior Tib (L4/L5) WFL WFL       Gastroc (S1/2) WFL WFL       EHL (L5)                           ** indicates limited strength based on limited ROM  Special Tests: deferred  Neurological Screen:        Sensation: Intact for light touch  Functional Mobility:         Gait/Ambulation:  Pt amb. without A.D however decreased heel strike RLE and decreased R knee flexion in swing phase of gait pattern. Transfers:  Pt demonstrates sit to stand to sit from standard seat without use of UE. Bed Mobility:  I with bed mobility        Stairs:  Pt demonstrates step to gait pattern for up and down stairs relying heavily on handrail. Balance:          Single leg stance:  R) 2 sec; L) 7 sec   Body Structures Involved:  1. Bones  2. Joints  3. Muscles  4. Ligaments Body Functions Affected:  1. Sensory/Pain  2. Neuromusculoskeletal  3. Movement Related Activities and Participation Affected:  1. General Tasks and Demands  2. Mobility  3. Self Care  4. Domestic Life  5. Community, Social and Civic Life   CLINICAL DECISION MAKING:   Outcome Measure: Tool Used: Lower Extremity Functional Scale (LEFS)  Score:  Initial: 40/80 Most Recent: 55/80 (Date: 3/10/17)   Interpretation of Score: 20 questions each scored on a 5 point scale with 0 representing \"extreme difficulty or unable to perform\" and 4 representing \"no difficulty\". The lower the score, the greater the functional disability. 80/80 represents no disability. Minimal detectable change is 9 points. Score 80 79-63 62-48 47-32 31-16 15-1 0   Modifier CH CI CJ CK CL CM CN     ?  Mobility - Walking and Moving Around:     - CURRENT STATUS: CJ - 20%-39% impaired, limited or restricted    - GOAL STATUS: CI - 1%-19% impaired, limited or restricted    - D/C STATUS:  ---------------To be determined---------------    Medical Necessity:   · Patient is expected to demonstrate progress in strength, range of motion and balance to increase independence with functional mobility safely. Reason for Services/Other Comments:  · Patient continues to require modification of therapeutic interventions to increase complexity of exercises. TREATMENT:   (In addition to Assessment/Re-Assessment sessions the following treatments were rendered)      Therapeutic Exercise: (see flow sheet below for minutes):  Exercises per grid below to improve mobility, strength and balance. Required minimal visual and verbal cues to promote proper body alignment and promote proper body mechanics. Progressed resistance, range and repetitions as indicated. Aquatic Therapy (see flow sheet below for minutes): Aquatic treatment performed per flow grid for Decreased muscle strength, Decreased static/dynamic balance and reactive control, Decreased range of motion and Ease of movement. Cues provided for technique. Assistance by therapist provided for progression of activities. Patient has difficulty with R knee pain. Date: 02/22/17 3/10/17  (PN) 3/12/17 03/15/17 03/17/17 03/20/17 03/22/17 03/24/17   Modalities: 12 min          IFC with ice  To R knee in long sitting                                Manual Therapy:           Soft tissue mobilization distal quad/hamstrings, ITB, proximal gastroc           Scar mobilization           Patella mobilizations           Aquatic Exercise:  2.5#  45 min 3.75# 55 min         Gait F/B/S/M  4L x4L closed paddles        Heel/Toe    x2L ea        4-way           PF/DF           Hamstring Curls  4L x4L        Hip Flexion with knee ext.   (rockette)  2L x2L        Squats    X15 Single Leg x20 single leg        SLR march  4L x4L        Lunges  4L x4L        Hip circles           Hip horizontal abduction/adduction           Core:             Core:           Deep well bike  5 min 4 min        Deep well jumping david  2 min 2 min        Deep well scissors  2 min 2 min        Deep well:  traction           Step ups   4x recip gait 4x recip gait        Hamstring Stretch  2H30 B LE         Step Lunge  2H30 B LE x10H5 BLE        Piriformis stretch           PF Stretch           Balance: Single leg Stance           Balance: Tandem   x4L        Step ups   VMO step down 2x15                     Therapeutic Exercise: 5  min   50 mins  60 mins  50  mins 50 mins 60 min   Quad sets X15 BLE          Heel slides X15 BLE    x10      Seated Marching        3# x20 BLE seated   LAQ X15 BLE at EOB   3# X20 b 3# 2x10 B 3# 2x10 B 4# 2x10 B 3# x20 BLE seated   LAQ c turnout      3# 2x10 B 4# 2x10 B    Mini-Squat    x20    x20 BLE   4-way hip    Red TB x20B extn red TB  extn red x20 x20 BLE 3#   Hamstring curls        x20 BLE 3#   Hip abd - side stepping     Red x15 ft c close SBA Shawanda TB  x5ft x6 Red TB 4x20 ft    Marching for hip flexion    3# x20 B Seated 3$ x20  SLR 3# 2x10 B    Calf raises    x20 x20 x20 x20    slantboard stretch     1 min 1 min 1 min 2H30 BLE   HSC    3# X20 B grn TB seated 2x10 B grn TB 2x10B Blue TB x20    Bridge c add ball sqz    x20 x20 x20 x20 x20   Bridge c abd tb hold    Red TB x20 Red x20 x20 x20    SLR        2x10 BLE 3#   SLR with ER        2x10 BLE 2#   Sidelying Hip abduction        x20 BLE 2#   Clamshells        x20 BLE 3#   Sit to stand    unable At table c cushion x10 Hi-lo table 2x10 Low mat table c 1 cushion 2x10 x12 from mat table to lowering ht   Step 4\" and 6'    x15 B ea  Lateral up &   x20 1 riser 2x10 B x20 BLE 8\"   Lateral step up        x20 BLE 6\"   Step ups onto Bos[address]     x10 B x15 B 2x10 B    Reciprocating stairs     1 flight (18 stairs)   1 flight- 18 stairs   Bike     10 mins 10 mins 7 mins 6 mins 6 min   F=forward  B=Backward  S=sidesteps  M=marches    H=hold    Manual: (see flow sheet above for minutes)   Modalities: (see flow sheet above for minutes)     HEP: Pt instructed to continue HEP per home health intervention.       Treatment/Session Assessment:  Pt participated in land based exercises with focus on strength RLE as well as proprioception. Pt continues to struggle with sit to stand from lower chairs without UE assist.  Quad  fatigue quickly with light resistance strengthening. Pt demonstrates mild to moderate difficulty ascending and descending stairs with reciprocating pattern due to R LE weakness- appears to be easier ascending than descending. Pt continues to work hard with plan to continue to address land based therapy and pending D/C in next couple of visits. · Pain/ Symptoms: Initial:   0/10 R knee  Pt denies knee pain at time of treatment today. Pt primary complaint of continued weakness with R>LE mm. Post Session:  0/10  ·     · Compliance with Program/Exercises: Will assess as treatment progresses. · Recommendations/Intent for next treatment session: \"Next visit will focus on advancements to more challenging activities\". Continue with TherEx tx session, maximizing effort to improve functional strength, flexibility and mobility, prior to discharge- pending next 2 to 3 visits.      Total Treatment Duration:   PT Patient Time In/Time Out  Time In: 0925  Time Out: KALPANA Garcia

## 2017-03-27 ENCOUNTER — HOSPITAL ENCOUNTER (OUTPATIENT)
Dept: PHYSICAL THERAPY | Age: 77
Discharge: HOME | End: 2017-03-27
Payer: MEDICARE

## 2017-03-27 PROCEDURE — 97110 THERAPEUTIC EXERCISES: CPT

## 2017-03-27 NOTE — PROGRESS NOTES
Rhonda Davis  : 1940 [address][birth year]  @ [address]  [address] [place] [address]  [phone]   [phone]        OUTPATIENT PHYSICAL THERAPY:Daily Note 3/27/2017      ICD-10: Treatment Diagnosis: Difficulty in walking, not elsewhere classified (R26.2)  Pain in right knee (M25.561)  Precautions/Allergies:   Prednisone   Fall Risk Score: 2 (? 5 = High Risk)  MD Orders: Evaluate and Treat MEDICAL/REFERRING DIAGNOSIS:  Status post total right knee replacement [Z96.651]   DATE OF ONSET: 17  REFERRING PHYSICIAN: Hung Mauricio MD  RETURN PHYSICIAN APPOINTMENT: 17     INITIAL ASSESSMENT:  Mr. Muriel Acosta is a 76year old white male seen for physical therapy per MD orders with complaint of right knee pain following R TKA. Pt evaluated for outpatient physical therapy today with the following deficits: right knee pain, decreased ROM/ strength R knee complex, decreased static, dynamic standing balance, antalgic gait. Pt would benefit from physical therapy to address the above deficits in order to return to increased independence with functional mobility with decreased pain. Pt would benefit from initially working in aquatic setting to off load R knee while addressing goals. As patient progresses, plan to transition to gravity challenging, land based exercises/ activities. Plan of care and goals reviewed with patient who verbalizes agreement. PROBLEM LIST (Impacting functional limitations):  1. Decreased Strength  2. Decreased ADL/Functional Activities  3. Decreased Ambulation Ability/Technique  4. Decreased Balance  5. Increased Pain  6. Decreased Flexibility/Joint Mobility  7. Edema/Girth INTERVENTIONS PLANNED:  1. Balance Exercise  2. Gait Training  3. Home Exercise Program (HEP)  4. Manual Therapy  5. Range of Motion (ROM)  6. Therapeutic Activites  7. Therapeutic Exercise/Strengthening  8. aquatics   9.  Modalities   TREATMENT PLAN:  Effective Dates: 02/22/17 TO 05/17/17. Frequency/Duration: 2- 3 times a week for 12 weeks    GOALS: (Goals have been discussed and agreed upon with patient.)  Short-Term Functional Goals: Time Frame: 2 weeks  Patient will demonstrate independence and compliance with home exercise program for management of symptoms. (MET)  Pt will demonstrate increase in Lower Extremity Functional Scale by 3-4  points for improved functional mobility. (MET 3/10/17)  Pt will demonstrate increased active range of motion for right knee  from 0 ° (extension) to 115 ° (flexion). (MET)  Pt will tolerate 35 to 40 minutes of aquatic therapy with pain of 2-3 /10 following intervention.  (MET)  Discharge Goals: Time Frame: 6 weeks  Pt will report Lower Extremity Functional Scale score of 60/80 for improved function. (Progressing 3/10/17)  Pt will demonstrate active range of motion of R knee from 0 ° (extension) to 125 °(flexion). (Met 3/10/17)  Pt will ambulate independently >/= 1500 feet with normal gait pattern with even stance time/ step length in bilateral lower extremities for safe community entry for grocery shopping. (MET 3/27/17)  Pt will demonstrate reciprocal gait up/ down 12 steps independently with use of unilateral handrail. (MET 3/27/17)    Rehabilitation Potential For Stated Goals: Good                    HISTORY:   History of Present Injury/Illness (Reason for Referral):  Pt is 76year old white male seen for physical therapy following R TKA 01/23/17. Prior to that most recent surgery pt had L TKA 10/28/16 with rehab as well as L1 kyphoplasty 01/13/17. Pt reports minimal pain throughout the day, reporting that most pain noted with stiffness when he doesn't move. Pt enjoys yard work, gardening and performing at nursing homes. Pt reports his goal is to get back to these activities. Past Medical History/Comorbidities:   Mr. Benita Abreu  has a past medical history of Diabetes ([address] GERD (gastroesophageal reflux disease); and Hypertension.  He also has no past medical history of Difficult intubation; Malignant hyperthermia due to anesthesia; Nausea & vomiting; or Pseudocholinesterase deficiency. Mr. Mona Healy  has a past surgical history that includes colonoscopy; endoscopy; and orthopaedic (Left). Social History/Living Environment:     Pt lives alone in one story home with 2 to 4 step to enter. Prior Level of Function/Work/Activity:  Pt enjoys gardening, yard work, singing/ music  Dominant Side:         RIGHT  Current Medications:    Current Outpatient Prescriptions:     HYDROcodone-acetaminophen (NORCO) 5-325 mg per tablet, Take 1-2 Tabs by mouth every six (6) hours as needed for Pain. Max Daily Amount: 8 Tabs., Disp: 50 Tab, Rfl: 0    amLODIPine (NORVASC) 2.5 mg tablet, , Disp: , Rfl:     metFORMIN (GLUCOPHAGE) 500 mg tablet, , Disp: , Rfl:     omeprazole (PRILOSEC) 40 mg capsule, , Disp: , Rfl:     triamcinolone acetonide (KENALOG) 0.1 % topical cream, , Disp: , Rfl:     zolpidem (AMBIEN) 5 mg tablet, Take  by mouth nightly as needed for Sleep., Disp: , Rfl:     sucralfate (CARAFATE) 1 gram tablet, Take 1 g by mouth four (4) times daily. , Disp: , Rfl:    Date Last Reviewed:  3/27/2017 pt brought list in chart--see paper copy in chart   EXAMINATION:   Observation/Orthostatic Postural Assessment:          Pt with noted edema in R knee complex- soft and boggy feel. In standing pt with noted thoracic kyphosis/ forward head.     Palpation:          Palpable tenderness in medial R knee (along hamstring insertion region)  ROM:  BUE WFL    BLE WFL with knee ROM as follows:  R knee:  0 to 108 °  L knee 0 to 115 °    3/6/17  R knee 0-120 °    3/10/17 (PN)  R knee  0-125 ° AAROM 03/17/17  R knee AROM 0-129 deg       Strength:     Date:  02/22/17 Date: (PN)  3/10/17 Date:     LE MMT Left Right Left Right Left Right   Hip Flex (L1/L2) 4+/5 4/5       Hip Abd (glut med) 4/5 4/5       Hip Ext 4/5 4/5       Quad    ( L3/4) 4/5 2+/5**  4/5     Hamstring 4/5 2+/5**  4/5     Anterior Tib (L4/L5) WFL WFL       Gastroc (S1/2) WFL WFL       EHL (L5)                           ** indicates limited strength based on limited ROM  Special Tests: deferred  Neurological Screen:        Sensation: Intact for light touch  Functional Mobility:         Gait/Ambulation:  Pt amb. without A.D however decreased heel strike RLE and decreased R knee flexion in swing phase of gait pattern. Transfers:  Pt demonstrates sit to stand to sit from standard seat without use of UE. Bed Mobility:  I with bed mobility        Stairs:  Pt demonstrates step to gait pattern for up and down stairs relying heavily on handrail. Balance:          Single leg stance:  R) 2 sec; L) 7 sec   Body Structures Involved:  1. Bones  2. Joints  3. Muscles  4. Ligaments Body Functions Affected:  1. Sensory/Pain  2. Neuromusculoskeletal  3. Movement Related Activities and Participation Affected:  1. General Tasks and Demands  2. Mobility  3. Self Care  4. Domestic Life  5. Community, Social and Civic Life   CLINICAL DECISION MAKING:   Outcome Measure: Tool Used: Lower Extremity Functional Scale (LEFS)  Score:  Initial: 40/80 Most Recent: 55/80 (Date: 3/10/17)   Interpretation of Score: 20 questions each scored on a 5 point scale with 0 representing \"extreme difficulty or unable to perform\" and 4 representing \"no difficulty\". The lower the score, the greater the functional disability. 80/80 represents no disability. Minimal detectable change is 9 points. Score 80 79-63 62-48 47-32 31-16 15-1 0   Modifier CH CI CJ CK CL CM CN     ?  Mobility - Walking and Moving Around:     - CURRENT STATUS: CJ - 20%-39% impaired, limited or restricted    - GOAL STATUS: CI - 1%-19% impaired, limited or restricted    - D/C STATUS:  ---------------To be determined---------------    Medical Necessity:   · Patient is expected to demonstrate progress in strength, range of motion and balance to increase independence with functional mobility safely. Reason for Services/Other Comments:  · Patient continues to require modification of therapeutic interventions to increase complexity of exercises. TREATMENT:   (In addition to Assessment/Re-Assessment sessions the following treatments were rendered)      Therapeutic Exercise: (see flow sheet below for minutes):  Exercises per grid below to improve mobility, strength and balance. Required minimal visual and verbal cues to promote proper body alignment and promote proper body mechanics. Progressed resistance, range and repetitions as indicated. Aquatic Therapy (see flow sheet below for minutes): Aquatic treatment performed per flow grid for Decreased muscle strength, Decreased static/dynamic balance and reactive control, Decreased range of motion and Ease of movement. Cues provided for technique. Assistance by therapist provided for progression of activities. Patient has difficulty with R knee pain. Date: 02/22/17 3/10/17  (PN) 3/12/17 03/15/17 03/17/17 03/20/17 03/22/17 03/24/17 03/27/17 03/29/18   ( DC )    Modalities: 12 min             IFC with ice  To R knee in long sitting                                         Manual Therapy:              Soft tissue mobilization distal quad/hamstrings, ITB, proximal gastroc              Scar mobilization              Patella mobilizations              Aquatic Exercise:  2.5#  45 min 3.75# 55 min            Gait F/B/S/M  4L x4L closed paddles           Heel/Toe    x2L ea           4-way              PF/DF              Hamstring Curls  4L x4L           Hip Flexion with knee ext.   (rockette)  2L x2L           Squats    X15 Single Leg x20 single leg           SLR march  4L x4L           Lunges  4L x4L           Hip circles              Hip horizontal abduction/adduction              Core:                Core:              Deep well bike  5 min 4 min           Deep well jumping david  2 min 2 min           Deep well scissors  2 min 2 min           Deep well:  traction              Step ups   4x recip gait 4x recip gait           Hamstring Stretch  2H30 B LE            Step Lunge  2H30 B LE x10H5 BLE           Piriformis stretch              PF Stretch              Balance: Single leg Stance              Balance: Tandem   x4L           Step ups   VMO step down 2x15                           Therapeutic Exercise: 5  min   50 mins  60 mins  50  mins 50 mins 60 min 60 mins     Quad sets X15 BLE             Heel slides X15 BLE    x10         Seated Marching        3# x20 BLE seated      LAQ X15 BLE at EOB   3# X20 b 3# 2x10 B 3# 2x10 B 4# 2x10 B 3# x20 B  3# 2x15  B     LAQ c turnout      3# 2x10 B 4# 2x10 B       Mini-Squat    x20    x20 BLE x20     4-way hip    Red TB x20B extn red TB  extn red x20 x20 BLE 3#      Hamstring curls        x20 BLE 3# 3# 2x10 B     Hip abd - side stepping     Red x15 ft c close SBA Shawanda TB  x5ft x6 Red TB 4x20 ft  Red TB 4x20 ft     Marching for hip flexion    3# x20 B Seated 3$ x20  SLR 3# 2x10 B  x20 alt ea B      Calf raises    x20 x20 x20 x20  x20     slantboard stretch     1 min 1 min 1 min 2H30 BLE 1min     HSC    3# X20 B grn TB seated 2x10 B grn TB 2x10B Blue TB x20       Bridge c add ball sqz    x20 x20 x20 x20 x20 x20     Bridge c abd tb hold    Red TB x20 Red x20 x20 x20  x20     SLR        2x10 BLE 3# 3# 2x10 B     SLR with ER        2x10 BLE 2#      Sidelying Hip abduction        x20 BLE 2# Seated add ball sqz     Clamshells        x20 BLE 3# grn TB  x20B     Sit to stand    unable At table c cushion x10 Hi-lo table 2x10 Low mat table c 1 cushion 2x10 x12 from mat table to lowering ht 2x10 mat table c cushion     Step 4\" and 6'    x15 B ea  Lateral up &   x20 1 riser 2x10 B x20 BLE 8\"      Lateral step up        x20 BLE 6\" x20 B     Step ups onto Bosu     x10 B x15 B 2x10 B  x20 B     Reciprocating stairs     1 flight (18 stairs)   1 flight- 18 stairs 1 flight- 18 stairs     Bike     10 mins 10 mins 7 mins 6 mins 6 min 5 mins     F=forward  B=Backward  S=sidesteps  M=marches    H=hold    Manual: (see flow sheet above for minutes)   Modalities: (see flow sheet above for minutes)     HEP: Pt instructed to continue HEP per home health intervention. Treatment/Session Assessment:  Pt participated in land based exercises with focus on strength RLE as well as proprioception. Pt continues to struggle with sit to stand from lower chairs without UE assist, can perform with B UE on thighs. Pt says is practicing this at home, needs to sit on cushion on kitchen chair. Quad  fatigue quickly with light resistance strengthening, however, pt demonstrates improved tolerance for repetitions. Pt demonstrates mild to moderate difficulty ascending and descending stairs with reciprocating pattern due to R LE weakness, uses 1 hand rail, tend to mildly \"pull\" when ascending. Pt continues to work hard with plan to continue to address land based therapy and pending D/C with next visit. · Pain/ Symptoms: Initial:   0/10 R knee  Pt primary complaint of continued weakness with R>LE mm. He reports compliance with HEP. Post Session:  0/10  ·     · Compliance with Program/Exercises: Will assess as treatment progresses. ·   · Recommendations/Intent for next treatment session: \"Next visit will focus on advancements to more challenging activities\". Continue with TherEx tx session, maximizing effort to improve functional strength, flexibility and mobility, prior to discharge. Anticipate DC at end of week.  (3/29/17)      Total Treatment Duration:   PT Patient Time In/Time Out  Time In: 1015  Time Out: 1500 [facility]

## 2017-03-29 ENCOUNTER — HOSPITAL ENCOUNTER (OUTPATIENT)
Dept: PHYSICAL THERAPY | Age: 77
Discharge: HOME | End: 2017-03-29
Payer: MEDICARE

## 2017-03-29 PROCEDURE — 97110 THERAPEUTIC EXERCISES: CPT

## 2017-03-29 PROCEDURE — G8979 MOBILITY GOAL STATUS: HCPCS

## 2017-03-29 PROCEDURE — G8978 MOBILITY CURRENT STATUS: HCPCS

## 2017-03-29 PROCEDURE — G8980 MOBILITY D/C STATUS: HCPCS

## 2017-03-29 NOTE — PROGRESS NOTES
Celena Saravia  : 1940 [id number] [facility],[address] @ [address]  [address] [place] [address].  [phone]   [phone]        OUTPATIENT PHYSICAL THERAPY:Daily Note and Discharge 3/29/2017      ICD-10: Treatment Diagnosis: Difficulty in walking, not elsewhere classified (R26.2)  Pain in right knee (M25.561)  Precautions/Allergies:   Prednisone   Fall Risk Score: 2 (? 5 = High Risk)  MD Orders: Evaluate and Treat MEDICAL/REFERRING DIAGNOSIS:  Status post total right knee replacement [Z96.651]   DATE OF ONSET: 17  REFERRING PHYSICIAN: Manjula Legih MD  RETURN PHYSICIAN APPOINTMENT: 17     INITIAL ASSESSMENT:  Mr. Angel Paniagua is a 76year old white male seen for physical therapy per MD orders with complaint of right knee pain following R TKA. Pt evaluated for outpatient physical therapy today with the following deficits: right knee pain, decreased ROM/ strength R knee complex, decreased static, dynamic standing balance, antalgic gait. Pt would benefit from physical therapy to address the above deficits in order to return to increased independence with functional mobility with decreased pain. Pt would benefit from initially working in aquatic setting to off load R knee while addressing goals. As patient progresses, plan to transition to gravity challenging, land based exercises/ activities. Plan of care and goals reviewed with patient who verbalizes agreement. PROBLEM LIST (Impacting functional limitations):  1. Decreased Strength  2. Decreased ADL/Functional Activities  3. Decreased Ambulation Ability/Technique  4. Decreased Balance  5. Increased Pain  6. Decreased Flexibility/Joint Mobility  7. Edema/Girth INTERVENTIONS PLANNED:  1. Balance Exercise  2. Gait Training  3. Home Exercise Program (HEP)  4. Manual Therapy  5. Range of Motion (ROM)  6. Therapeutic Activites  7. Therapeutic Exercise/Strengthening  8. aquatics   9.  Modalities   TREATMENT PLAN:  Effective Dates: 02/22/17 TO 05/17/17. Frequency/Duration: 2- 3 times a week for 12 weeks    GOALS: (Goals have been discussed and agreed upon with patient.)  Short-Term Functional Goals: Time Frame: 2 weeks  Patient will demonstrate independence and compliance with home exercise program for management of symptoms. (MET)  Pt will demonstrate increase in Lower Extremity Functional Scale by 3-4  points for improved functional mobility. (MET 3/10/17)  Pt will demonstrate increased active range of motion for right knee  from 0 ° (extension) to 115 ° (flexion). (MET)  Pt will tolerate 35 to 40 minutes of aquatic therapy with pain of 2-3 /10 following intervention.  (MET)    Discharge Goals: Time Frame: 6 weeks  Pt will report Lower Extremity Functional Scale score of 60/80 for improved function. [will progress as part of HEP (57/60)  3/29/17]  Pt will demonstrate active range of motion of R knee from 0 ° (extension) to 125 °(flexion). (Met 3/10/17)  Pt will ambulate independently >/= 1500 feet with normal gait pattern with even stance time/ step length in bilateral lower extremities for safe community entry for grocery shopping. (MET 3/27/17)  Pt will demonstrate reciprocal gait up/ down 12 steps independently with use of unilateral handrail. (MET 3/27/17)    Rehabilitation Potential For Stated Goals: Good                    HISTORY:   History of Present Injury/Illness (Reason for Referral):  Pt is 76year old white male seen for physical therapy following R TKA 01/23/17. Prior to that most recent surgery pt had L TKA 10/28/16 with rehab as well as L1 kyphoplasty 01/13/17. Pt reports minimal pain throughout the day, reporting that most pain noted with stiffness when he doesn't move. Pt enjoys yard work, gardening and performing at nursing homes. Pt reports his goal is to get back to these activities.     Past Medical History/Comorbidities:   Mr. Jesus Gonzalez  has a past medical history of Diabetes ([facility] [address] GERD (gastroesophageal reflux disease); and Hypertension. He also has no past medical history of Difficult intubation; Malignant hyperthermia due to anesthesia; Nausea & vomiting; or Pseudocholinesterase deficiency. Mr. Melody Taylor  has a past surgical history that includes colonoscopy; endoscopy; and orthopaedic (Left). Social History/Living Environment:     Pt lives alone in one story home with 2 to 4 step to enter. Prior Level of Function/Work/Activity:  Pt enjoys gardening, yard work, singing/ music  Dominant Side:         RIGHT  Current Medications:    Current Outpatient Prescriptions:     HYDROcodone-acetaminophen (NORCO) 5-325 mg per tablet, Take 1-2 Tabs by mouth every six (6) hours as needed for Pain. Max Daily Amount: 8 Tabs., Disp: 50 Tab, Rfl: 0    amLODIPine (NORVASC) 2.5 mg tablet, , Disp: , Rfl:     metFORMIN (GLUCOPHAGE) 500 mg tablet, , Disp: , Rfl:     omeprazole (PRILOSEC) 40 mg capsule, , Disp: , Rfl:     triamcinolone acetonide (KENALOG) 0.1 % topical cream, , Disp: , Rfl:     zolpidem (AMBIEN) 5 mg tablet, Take  by mouth nightly as needed for Sleep., Disp: , Rfl:     sucralfate (CARAFATE) 1 gram tablet, Take 1 g by mouth four (4) times daily. , Disp: , Rfl:    Date Last Reviewed:  3/29/2017 pt brought list in chart--see paper copy in chart   EXAMINATION:   Observation/Orthostatic Postural Assessment:          Pt with noted edema in R knee complex- soft and boggy feel. In standing pt with noted thoracic kyphosis/ forward head.     Palpation:          Palpable tenderness in medial R knee (along hamstring insertion region)  ROM:  BUE WFL    BLE WFL with knee ROM as follows:  R knee:  0 to 108 °  L knee 0 to 115 °    3/6/17  R knee 0-120 °    3/10/17 (PN)  R knee  0-125 ° AAROM 03/17/17  R knee AROM 0-129 deg    03/29/17 R knee 0 to 129 °       Strength:     Date:  02/22/17 Date: (PN)  3/10/17 Date:  03/29/17   LE MMT Left Right Left Right Left Right   Hip Flex (L1/L2) 4+/5 4/5       Hip Abd (glut med) 4/5 4/5       Hip Ext 4/5 4/5       Quad    ( L3/4) 4/5 2+/5**  4/5  4/5   Hamstring 4/5 2+/5**  4/5  4/5   Anterior Tib (L4/L5) WFL WFL       Gastroc (S1/2) WFL WFL       EHL (L5)                           ** indicates limited strength based on limited ROM  Special Tests: deferred  Neurological Screen:        Sensation: Intact for light touch  Functional Mobility:         Gait/Ambulation:  Pt amb. without A.D however decreased heel strike RLE and decreased R knee flexion in swing phase of gait pattern. Transfers:  Pt demonstrates sit to stand to sit from standard seat without use of UE. Bed Mobility:  I with bed mobility        Stairs:  Pt demonstrates step to gait pattern for up and down stairs relying heavily on handrail. Balance:          Single leg stance:  R) 2 sec; L) 7 sec   Body Structures Involved:  1. Bones  2. Joints  3. Muscles  4. Ligaments Body Functions Affected:  1. Sensory/Pain  2. Neuromusculoskeletal  3. Movement Related Activities and Participation Affected:  1. General Tasks and Demands  2. Mobility  3. Self Care  4. Domestic Life  5. Community, Social and Civic Life   CLINICAL DECISION MAKING:   Outcome Measure: Tool Used: Lower Extremity Functional Scale (LEFS)  Score:  Initial: 40/80 Most Recent: 57/80 (Date: 3/29/17)   Interpretation of Score: 20 questions each scored on a 5 point scale with 0 representing \"extreme difficulty or unable to perform\" and 4 representing \"no difficulty\". The lower the score, the greater the functional disability. 80/80 represents no disability. Minimal detectable change is 9 points. Score 80 79-63 62-48 47-32 31-16 15-1 0   Modifier CH CI CJ CK CL CM CN     ?  Mobility - Walking and Moving Around:     - CURRENT STATUS: CJ - 20%-39% impaired, limited or restricted    - GOAL STATUS: CI - 1%-19% impaired, limited or restricted    - D/C STATUS:  CJ - 20%-39% impaired, limited or restricted    Medical Necessity:   · Patient is expected to demonstrate progress in strength, range of motion and balance to increase independence with functional mobility safely. Reason for Services/Other Comments:  · Patient continues to require modification of therapeutic interventions to increase complexity of exercises. TREATMENT:   (In addition to Assessment/Re-Assessment sessions the following treatments were rendered)      Therapeutic Exercise: (see flow sheet below for minutes):  Exercises per grid below to improve mobility, strength and balance. Required minimal visual and verbal cues to promote proper body alignment and promote proper body mechanics. Progressed resistance, range and repetitions as indicated. Aquatic Therapy (see flow sheet below for minutes): Aquatic treatment performed per flow grid for Decreased muscle strength, Decreased static/dynamic balance and reactive control, Decreased range of motion and Ease of movement. Cues provided for technique. Assistance by therapist provided for progression of activities. Patient has difficulty with R knee pain. Date: 02/22/17 3/10/17  (PN) 3/12/17 03/15/17 03/17/17 03/20/17 03/22/17 03/24/17 03/27/17 03/29/18   ( DC )    Modalities: 12 min             IFC with ice  To R knee in long sitting                                         Manual Therapy:              Soft tissue mobilization distal quad/hamstrings, ITB, proximal gastroc              Scar mobilization              Patella mobilizations              Aquatic Exercise:  2.5#  45 min 3.75# 55 min            Gait F/B/S/M  4L x4L closed paddles           Heel/Toe    x2L ea           4-way              PF/DF              Hamstring Curls  4L x4L           Hip Flexion with knee ext.   (rockette)  2L x2L           Squats    X15 Single Leg x20 single leg           SLR march  4L x4L           Lunges  4L x4L           Hip circles              Hip horizontal abduction/adduction              Core:                Core:              Deep well bike  5 min 4 min           Deep well jumping david  2 min 2 min           Deep well scissors  2 min 2 min           Deep well:  traction              Step ups   4x recip gait 4x recip gait           Hamstring Stretch  2H30 B LE            Step Lunge  2H30 B LE x10H5 BLE           Piriformis stretch              PF Stretch              Balance: Single leg Stance              Balance: Tandem   x4L           Step ups   VMO step down 2x15                           Therapeutic Exercise: 5  min   50 mins  60 mins  50  mins 50 mins 60 min 60 mins   50 mins    Quad sets X15 BLE             Heel slides X15 BLE    x10         Seated Marching        3# x20 BLE seated      LAQ X15 BLE at EOB   3# X20 b 3# 2x10 B 3# 2x10 B 4# 2x10 B 3# x20 B  3# 2x15  B grn TB for HEP x20 B    LAQ c turnout      3# 2x10 B 4# 2x10 B       Mini-Squat    x20    x20 BLE x20     4-way hip    Red TB x20B extn red TB  extn red x20 x20 BLE 3#      Hamstring curls        x20 BLE 3# 3# 2x10 B grn TB for HEP x20 B    Hip abd - side stepping     Red x15 ft c close SBA Shawanda TB  x5ft x6 Red TB 4x20 ft  Red TB 4x20 ft Red TB   4x20 ft    Marching for hip flexion    3# x20 B Seated 3$ x20  SLR 3# 2x10 B  x20 alt ea B      Calf raises    x20 x20 x20 x20  x20 x20     slantboard stretch     1 min 1 min 1 min 2H30 BLE 1min 1 min    HSC    3# X20 B grn TB seated 2x10 B grn TB 2x10B Blue TB x20   Same as above    Bridge c add ball sqz    x20 x20 x20 x20 x20 x20 x20    Bridge c abd tb hold    Red TB x20 Red x20 x20 x20  x20 20    SLR        2x10 BLE 3# 3# 2x10 B     SLR with ER        2x10 BLE 2#      Sidelying Hip abduction        x20 BLE 2# Seated add ball sqz     Clamshells        x20 BLE 3# grn TB  x20B grn TB x20 B    Sit to stand    unable At table c cushion x10 Hi-lo table 2x10 Low mat table c 1 cushion 2x10 x12 from mat table to lowering ht 2x10 mat table c cushion 2x1grn TB x20B0    Step 4\" and 6'    x15 B ea  Lateral up &   x20 1 riser 2x10 B x20 BLE 8\" Lateral step up        x20 BLE 6\" x20 B x20 B    Step ups onto Bosu     x10 B x15 B 2x10 B  x20 B x20 B    Reciprocating stairs     1 flight (18 stairs)   1 flight- 18 stairs 1 flight- 18 stairs     Bike     10 mins 10 mins 7 mins 6 mins 6 min 5 mins 5 mins    F=forward  B=Backward  S=sidesteps  M=marches    H=hold    Manual: (see flow sheet above for minutes)   Modalities: (see flow sheet above for minutes)     HEP: Pt instructed to continue HEP per home health intervention. Treatment/Session Assessment:  With today's treatment session pt review and performed TherEx as noted above. Today is his final treatment session, he will continue independently with HEP and seek new referral is sx recur. All STG and LTG met except LTG for functional tool in which he has not met LEFS score however functionally, pt is independent in home and community. · Pain/ Symptoms: Initial:   0/10 R knee  Pt notes weakness with R>L LE, most notable with flights of stairs and sit to stand from low chair. Shayne Rios Post Session:  0/10  ·     · Compliance with Program/Exercises: Will assess as treatment progresses. ·   · Recommendations/Intent for next treatment session:  Today is patient final treatment session. He has met all STG goals and will continue to progress with all Discharge goals at part of HEP.    Total Treatment Duration:   PT Patient Time In/Time Out  Time In: 1015  Time Out: [facility]

## 2017-03-31 ENCOUNTER — APPOINTMENT (OUTPATIENT)
Dept: PHYSICAL THERAPY | Age: 77
End: 2017-03-31
Payer: MEDICARE

## 2017-04-03 ENCOUNTER — APPOINTMENT (OUTPATIENT)
Dept: PHYSICAL THERAPY | Age: 77
End: 2017-04-03

## 2017-04-05 ENCOUNTER — APPOINTMENT (OUTPATIENT)
Dept: PHYSICAL THERAPY | Age: 77
End: 2017-04-05

## 2017-04-07 ENCOUNTER — APPOINTMENT (OUTPATIENT)
Dept: PHYSICAL THERAPY | Age: 77
End: 2017-04-07

## 2017-04-19 ENCOUNTER — APPOINTMENT (OUTPATIENT)
Dept: PHYSICAL THERAPY | Age: 77
End: 2017-04-19

## 2018-05-04 ENCOUNTER — APPOINTMENT (OUTPATIENT)
Dept: CT IMAGING | Age: 78
End: 2018-05-04
Attending: EMERGENCY MEDICINE
Payer: MEDICARE

## 2018-05-04 ENCOUNTER — HOSPITAL ENCOUNTER (EMERGENCY)
Age: 78
Discharge: HOME | End: 2018-05-04
Attending: EMERGENCY MEDICINE
Payer: MEDICARE

## 2018-05-04 VITALS
SYSTOLIC BLOOD PRESSURE: 180 MMHG | OXYGEN SATURATION: 96 % | DIASTOLIC BLOOD PRESSURE: 75 MMHG | HEART RATE: 71 BPM | HEIGHT: 70 IN | TEMPERATURE: 98.5 F | WEIGHT: 180 LBS | RESPIRATION RATE: 18 BRPM | BODY MASS INDEX: 25.77 KG/M2

## 2018-05-04 DIAGNOSIS — S00.81XA ABRASION OF FOREHEAD, INITIAL ENCOUNTER: ICD-10-CM

## 2018-05-04 DIAGNOSIS — S02.2XXA CLOSED FRACTURE OF NASAL BONE, INITIAL ENCOUNTER: ICD-10-CM

## 2018-05-04 DIAGNOSIS — S01.21XA LACERATION OF NOSE, INITIAL ENCOUNTER: ICD-10-CM
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
## 2018-05-04 PROCEDURE — 90715 TDAP VACCINE 7 YRS/> IM: CPT | Performed by: EMERGENCY MEDICINE

## 2018-05-04 PROCEDURE — 72125 CT NECK SPINE W/O DYE: CPT

## 2018-05-04 PROCEDURE — 70450 CT HEAD/BRAIN W/O DYE: CPT

## 2018-05-04 PROCEDURE — 75810000293 HC SIMP/SUPERF WND  RPR: Performed by: EMERGENCY MEDICINE

## 2018-05-04 PROCEDURE — 74011250637 HC RX REV CODE- 250/637: Performed by: EMERGENCY MEDICINE

## 2018-05-04 PROCEDURE — 90471 IMMUNIZATION ADMIN: CPT | Performed by: EMERGENCY MEDICINE

## 2018-05-04 PROCEDURE — 70486 CT MAXILLOFACIAL W/O DYE: CPT

## 2018-05-04 PROCEDURE — 77030002916 HC SUT ETHLN J&J -A

## 2018-05-04 PROCEDURE — 74011250636 HC RX REV CODE- 250/636: Performed by: EMERGENCY MEDICINE

## 2018-05-04 PROCEDURE — 99284 EMERGENCY DEPT VISIT MOD MDM: CPT | Performed by: EMERGENCY MEDICINE

## 2018-05-04 RX ORDER — ACETAMINOPHEN 325 MG/1
650 TABLET ORAL
Status: COMPLETED | OUTPATIENT
Start: 2018-05-04 | End: 2018-05-04

## 2018-05-04 RX ORDER — AMOXICILLIN AND CLAVULANATE POTASSIUM 875; 125 MG/1; MG/1
1 TABLET, FILM COATED ORAL 2 TIMES DAILY
Qty: 14 TAB | Refills: 0 | Status: SHIPPED | OUTPATIENT
Start: 2018-05-04 | End: 2018-05-11

## 2018-05-04 RX ORDER — HYDROCODONE BITARTRATE AND ACETAMINOPHEN 5; 325 MG/1; MG/1
1 TABLET ORAL
Qty: 8 TAB | Refills: 0 | Status: SHIPPED | OUTPATIENT
Start: 2018-05-04 | End: 2018-05-08

## 2018-05-04 RX ADMIN — ACETAMINOPHEN 650 MG: 325 TABLET ORAL at 16:45

## 2018-05-04 RX ADMIN — TETANUS TOXOID, REDUCED DIPHTHERIA TOXOID AND ACELLULAR PERTUSSIS VACCINE, ADSORBED 0.5 ML: 5; 2.5; 8; 8; 2.5 SUSPENSION INTRAMUSCULAR at 18:44

## 2018-05-04 NOTE — DISCHARGE INSTRUCTIONS
Facial Fracture: Care Instructions  Your Care Instructions  You have broken (fractured) one or more bones in your face. Swelling and bruising from the injury are likely to get worse over the first couple of days. After that, the swelling should steadily improve until it is gone. If you have bruises on your face, they may change as they heal. The skin may turn from black and blue to green to yellow or brown before it returns to its normal color. It may take several weeks for your injury to heal.  It is very important that you get follow-up care as directed so that the injury heals properly and does not lead to problems. The kind of care and treatment you will need depends on the specific type of break (or breaks) you have. You heal best when you take good care of yourself. Eat a variety of healthy foods, and don't smoke. Follow-up care is a key part of your treatment and safety. Be sure to make and go to all appointments, and call your doctor if you are having problems. It's also a good idea to know your test results and keep a list of the medicines you take. How can you care for yourself at home? · Put ice or a cold pack on your injury for 10 to 20 minutes at a time. Try to do this every 1 to 2 hours for the next 3 days (when you are awake) or until the swelling goes down. Put a thin cloth between the ice pack and your skin. · Go to all follow-up appointments with your doctor. Your doctor will determine whether you need further treatment, including surgery. · Take your medicines exactly as prescribed. Call your doctor if you think you are having a problem with your medicine. You will get more details on the specific medicines your doctor prescribes. · If your doctor prescribed antibiotics, take them exactly as directed. Do not stop taking them just because you feel better. You need to take the full course of antibiotics. · Be safe with medicines. Read and follow all instructions on the label.   ¨ If the doctor gave you a prescription medicine for pain, take it as prescribed. ¨ If you are not taking a prescription pain medicine, ask your doctor if you can take an over-the-counter medicine. · Keep your head elevated when you sleep. · Eat soft food to decrease jaw pain. · Do not blow your nose. Dab it with a tissue if you need to. When should you call for help? Call 911 anytime you think you may need emergency care. For example, call if:  ? · You have a seizure. ? · You passed out (lost consciousness). ? · You have tingling, weakness, or numbness on one side of your body. ?Call your doctor now or seek immediate medical care if:  ? · You have a severe headache. ? · You develop double vision. ? · You have a fever and stiff neck. ? · Clear, watery fluid drains from your nose. ? · You feel dizzy or lightheaded. ? · You have new eye pain or changes in your vision, such as blurring. ? · You have new ear pain, ringing in your ears, or trouble hearing. ? · You are confused, irritable, or not acting normally. ? · You have a hard time standing, walking, or talking. ? · You have new mouth or tooth pain, or you have trouble chewing. ? · You have increasing pain even after you have taken your pain medicine. ? Watch closely for changes in your health, and be sure to contact your doctor if:  ? · You develop a cough, cold, or sinus infection. ? · The symptoms from your injury are not steadily improving. Where can you learn more? Go to http://tabitha-yazmin.info/. Enter [id number] in the search box to learn more about \"Facial Fracture: Care Instructions. \"  Current as of: October 14, 2016  Content Version: 11.4  © 9950-2082 [employer]. Care instructions adapted under license by [employer] (which disclaims liability or warranty for this information).  If you have questions about a medical condition or this instruction, always ask your healthcare professional. [employer], [facility] disclaims any warranty or liability for your use of this information. Preventing Falls: Care Instructions  Your Care Instructions    Getting around your home safely can be a challenge if you have injuries or health problems that make it easy for you to fall. Loose rugs and furniture in walkways are among the dangers for many older people who have problems walking or who have poor eyesight. People who have conditions such as arthritis, osteoporosis, or dementia also have to be careful not to fall. You can make your home safer with a few simple measures. Follow-up care is a key part of your treatment and safety. Be sure to make and go to all appointments, and call your doctor if you are having problems. It's also a good idea to know your test results and keep a list of the medicines you take. How can you care for yourself at home? Taking care of yourself  · You may get dizzy if you do not drink enough water. To prevent dehydration, drink plenty of fluids, enough so that your urine is light yellow or clear like water. Choose water and other caffeine-free clear liquids. If you have kidney, heart, or liver disease and have to limit fluids, talk with your doctor before you increase the amount of fluids you drink. · Exercise regularly to improve your strength, muscle tone, and balance. Walk if you can. Swimming may be a good choice if you cannot walk easily. · Have your vision and hearing checked each year or any time you notice a change. If you have trouble seeing and hearing, you might not be able to avoid objects and could lose your balance. · Know the side effects of the medicines you take. Ask your doctor or pharmacist whether the medicines you take can affect your balance. Sleeping pills or sedatives can affect your balance. · Limit the amount of alcohol you drink. Alcohol can impair your balance and other senses.   · Ask your doctor whether calluses or corns on your feet need to be removed. If you wear loose-fitting shoes because of calluses or corns, you can lose your balance and fall. · Talk to your doctor if you have numbness in your feet. Preventing falls at home  · Remove raised doorway thresholds, throw rugs, and clutter. Repair loose carpet or raised areas in the floor. · Move furniture and electrical cords to keep them out of walking paths. · Use nonskid floor wax, and wipe up spills right away, especially on ceramic tile floors. · If you use a walker or cane, put rubber tips on it. If you use crutches, clean the bottoms of them regularly with an abrasive pad, such as steel wool. · Keep your house well lit, especially [place], and outside walkways. Use night-lights in areas such as hallways and bathrooms. Add extra light switches or use remote switches (such as switches that go on or off when you clap your hands) to make it easier to turn lights on if you have to get up during the night. · Install sturdy handrails on stairways. · Move items in your cabinets so that the things you use a lot are on the lower shelves (about waist level). · Keep a cordless phone and a flashlight with new batteries by your bed. If possible, put a phone in each of the main rooms of your house, or carry a cell phone in case you fall and cannot reach a phone. Or, you can wear a device around your neck or wrist. You push a button that sends a signal for help. · Wear low-heeled shoes that fit well and give your feet good support. Use footwear with nonskid soles. Check the heels and soles of your shoes for wear. Repair or replace worn heels or soles. · Do not wear socks without shoes on wood floors. · Walk on the grass when the sidewalks are slippery. If you live in an area that gets snow and ice in the winter, sprinkle salt on slippery steps and sidewalks.   Preventing falls in the bath  · Install grab bars and nonskid mats inside and outside your shower or tub and near the toilet and sinks.  · Use shower chairs and bath benches. · Use a hand-held shower head that will allow you to sit while showering. · Get into a tub or shower by putting the weaker leg in first. Get out of a tub or shower with your strong side first.  · Repair loose toilet seats and consider installing a raised toilet seat to make getting on and off the toilet easier. · Keep your bathroom door unlocked while you are in the shower. Where can you learn more? Go to http://tabitha-yazmin.info/. Enter [id number] in the search box to learn more about \"Preventing Falls: Care Instructions. \"  Current as of: May 12, 2017  Content Version: 11.4  © 3423-1295 [employer]. Care instructions adapted under license by [employer] (which disclaims liability or warranty for this information). If you have questions about a medical condition or this instruction, always ask your healthcare professional. [place] [address] any warranty or liability for your use of this information. Cuts: Care Instructions  Your Care Instructions  A cut can happen anywhere on your body. Stitches, staples, skin adhesives, or pieces of tape called Steri-Strips are sometimes used to keep the edges of a cut together and help it heal. Steri-Strips can be used by themselves or with stitches or staples. Sometimes cuts are left open. If the cut went deep and through the skin, the doctor may have closed the cut in two layers. A deeper layer of stitches brings the deep part of the cut together. These stitches will dissolve and don't need to be removed. The upper layer closure, which could be stitches, staples, Steri-Strips, or adhesive, is what you see on the cut. A cut is often covered by a bandage. The doctor has checked you carefully, but problems can develop later. If you notice any problems or new symptoms, get medical treatment right away. Follow-up care is a key part of your treatment and safety.  Be sure to make and go to all appointments, and call your doctor if you are having problems. It's also a good idea to know your test results and keep a list of the medicines you take. How can you care for yourself at home? If a cut is open or closed  · Prop up the sore area on a pillow anytime you sit or lie down during the next 3 days. Try to keep it above the level of your heart. This will help reduce swelling. · Keep the cut dry for the first 24 to 48 hours. After this, you can shower if your doctor okays it. Pat the cut dry. · Don't soak the cut, such as in a bathtub. Your doctor will tell you when it's safe to get the cut wet. · After the first 24 to 48 hours, clean the cut with soap and water 2 times a day unless your doctor gives you different instructions. ¨ Don't use hydrogen peroxide or alcohol, which can slow healing. ¨ You may cover the cut with a thin layer of petroleum jelly and a nonstick bandage. ¨ If the doctor put a bandage over the cut, put on a new bandage after cleaning the cut or if the bandage gets wet or dirty. · Avoid any activity that could cause your cut to reopen. · Be safe with medicines. Read and follow all instructions on the label. ¨ If the doctor gave you a prescription medicine for pain, take it as prescribed. ¨ If you are not taking a prescription pain medicine, ask your doctor if you can take an over-the-counter medicine. If the cut is closed with stitches, staples, or Steri-Strips  · Follow the above instructions for open or closed cuts. · Do not remove the stitches or staples on your own. Your doctor will tell you when to come back to have the stitches or staples removed. · Leave Steri-Strips on until they fall off. If the cut is closed with a skin adhesive  · Follow the above instructions for open or closed cuts. · Leave the skin adhesive on your skin until it falls off on its own. This may take 5 to 10 days. · Do not scratch, rub, or pick at the adhesive.   · Do not put the sticky part of a bandage directly on the adhesive. · Do not put any kind of ointment, cream, or lotion over the area. This can make the adhesive fall off too soon. Do not use hydrogen peroxide or alcohol, which can slow healing. When should you call for help? Call your doctor now or seek immediate medical care if:  ? · You have new pain, or your pain gets worse. ? · The skin near the cut is cold or pale or changes color. ? · You have tingling, weakness, or numbness near the cut.   ? · The cut starts to bleed, and blood soaks through the bandage. Oozing small amounts of blood is normal.   ? · You have trouble moving the area near the cut.   ? · You have symptoms of infection, such as:  ¨ Increased pain, swelling, warmth, or redness around the cut. ¨ Red streaks leading from the cut. ¨ Pus draining from the cut. ¨ A fever. ? Watch closely for changes in your health, and be sure to contact your doctor if:  ? · The cut reopens. ? · You do not get better as expected. Where can you learn more? Go to http://tabitha-yazmin.info/. Enter M735 in the search box to learn more about \"Cuts: Care Instructions. \"  Current as of: March 20, 2017  Content Version: 11.4  © 0467-0990 [email or web address]. Care instructions adapted under license by [employer] (which disclaims liability or warranty for this information). If you have questions about a medical condition or this instruction, always ask your healthcare professional. [place] [address] any warranty or liability for your use of this information. Cuts Closed With Stitches: Care Instructions  Your Care Instructions  A cut can happen anywhere on your body. The doctor used stitches to close the cut. Using stitches also helps the cut heal and reduces scarring. Sometimes pieces of tape called Steri-Strips are put over the stitches.   If the cut went deep and through the skin, the doctor may have put in two layers of stitches. The deeper layer brings the deep part of the cut together. These stitches will dissolve and don't need to be removed. The stitches in the upper layer are the ones you see on the cut. You will probably have a bandage over the stitches. You will need to have the stitches removed, usually in 7 to 14 days. The doctor has checked you carefully, but problems can develop later. If you notice any problems or new symptoms, get medical treatment right away. Follow-up care is a key part of your treatment and safety. Be sure to make and go to all appointments, and call your doctor if you are having problems. It's also a good idea to know your test results and keep a list of the medicines you take. How can you care for yourself at home? · Keep the cut dry for the first 24 to 48 hours. After this, you can shower if your doctor okays it. Pat the cut dry. · Don't soak the cut, such as in a bathtub. Your doctor will tell you when it's safe to get the cut wet. · If your doctor told you how to care for your cut, follow your doctor's instructions. If you did not get instructions, follow this general advice:  ¨ After the first 24 to 48 hours, wash around the cut with clean water 2 times a day. Don't use hydrogen peroxide or alcohol, which can slow healing. ¨ You may cover the cut with a thin layer of petroleum jelly, such as Vaseline, and a nonstick bandage. ¨ Apply more petroleum jelly and replace the bandage as needed. · Prop up the sore area on a pillow anytime you sit or lie down during the next 3 days. Try to keep it above the level of your heart. This will help reduce swelling. · Avoid any activity that could cause your cut to reopen. · Do not remove the stitches on your own. Your doctor will tell you when to come back to have the stitches removed. · Leave Steri-Strips on until they fall off. · Be safe with medicines. Read and follow all instructions on the label.   ¨ If the doctor gave you a prescription medicine for pain, take it as prescribed. ¨ If you are not taking a prescription pain medicine, ask your doctor if you can take an over-the-counter medicine. When should you call for help? Call your doctor now or seek immediate medical care if:  ? · You have new pain, or your pain gets worse. ? · The skin near the cut is cold or pale or changes color. ? · You have tingling, weakness, or numbness near the cut.   ? · The cut starts to bleed, and blood soaks through the bandage. Oozing small amounts of blood is normal.   ? · You have trouble moving the area near the cut.   ? · You have symptoms of infection, such as:  ¨ Increased pain, swelling, warmth, or redness around the cut. ¨ Red streaks leading from the cut. ¨ Pus draining from the cut. ¨ A fever. ? Watch closely for changes in your health, and be sure to contact your doctor if:  ? · The cut reopens. ? · You do not get better as expected. Where can you learn more? Go to http://tabitha-yazmin.info/. Enter R217 in the search box to learn more about \"Cuts Closed With Stitches: Care Instructions. \"  Current as of: March 20, 2017  Content Version: 11.4  © 5135-8445 [employer]. Care instructions adapted under license by [employer] (which disclaims liability or warranty for this information). If you have questions about a medical condition or this instruction, always ask your healthcare professional. [place] [address] any warranty or liability for your use of this information. Broken Nose: Care Instructions  Your Care Instructions    A broken nose is a break, or fracture, of the bone or cartilage. Most broken noses need only home care and a follow-up visit with a doctor. The swelling should go down in a few days. Bruises around your eyes and nose should go away in 2 to 3 weeks. You heal best when you take good care of yourself.  Eat a variety of healthy foods, and don't smoke.  Follow-up care is a key part of your treatment and safety. Be sure to make and go to all appointments, and call your doctor if you are having problems. It's also a good idea to know your test results and keep a list of the medicines you take. How can you care for yourself at home? · If you have a nasal splint or packing, leave it in place until a doctor removes it. · If your doctor prescribed antibiotics, take them as directed. Do not stop taking them just because you feel better. You need to take the full course of antibiotics. · Take decongestants as directed to help you breathe after the splint or packing is removed. Your doctor may give you a prescription or suggest over-the-counter medicine. · Be safe with medicines. Take pain medicines exactly as directed. ¨ If the doctor gave you a prescription medicine for pain, take it as prescribed. ¨ If you are not taking a prescription pain medicine, ask your doctor if you can take an over-the-counter medicine. · Put ice or a cold pack on your nose for 10 to 20 minutes at a time. Try to do this every 1 to 2 hours for the first 3 days (when you are awake) or until the swelling goes down. Put a thin cloth between the ice pack and your skin. · Sleep with your head slightly raised until the swelling goes down. Prop up your head and shoulders on pillows. · Do not play contact sports for 6 weeks. When should you call for help? Call 911 anytime you think you may need emergency care. For example, call if:  ? · You have trouble breathing. ? · You passed out (lost consciousness). ?Call your doctor now or seek immediate medical care if:  ? · You have signs of infection, such as:  ¨ Increased pain, swelling, warmth, or redness. ¨ Red streaks leading from the area. ¨ Pus draining from the area. ¨ A fever. ? · You have clear fluid draining from your nose. ? · You have vision changes. ? · Your nose is bleeding. ? · You have new or worse pain. ? Watch closely for changes in your health, and be sure to contact your doctor if:  ? · You do not get better as expected. Where can you learn more? Go to http://tabitha-yazmin.info/. Enter Y345 in the search box to learn more about \"Broken Nose: Care Instructions. \"  Current as of: March 21, 2017  Content Version: 11.4  © 6282-2237 [employer]. Care instructions adapted under license by [employer] (which disclaims liability or warranty for this information). If you have questions about a medical condition or this instruction, always ask your healthcare professional. [place] [address] any warranty or liability for your use of this information. Wound Check: Care Instructions  Your Care Instructions  People have wounds that need care for many reasons. You may have a cut that needs care after surgery. You may have a cut or puncture wound from an accident. Or you may have a wound because of a condition like diabetes. Whatever the cause of your wound, there are things you can do to care for it at home. Your doctor may also want you to come back for a wound check. The wound check lets the doctor know how your wound is healing and if you need more treatment. Follow-up care is a key part of your treatment and safety. Be sure to make and go to all appointments, and call your doctor if you are having problems. It's also a good idea to know your test results and keep a list of the medicines you take. How can you care for yourself at home? · If your doctor told you how to care for your wound, follow your doctor's instructions. If you did not get instructions, follow this general advice:  ¨ You may cover the wound with a thin layer of petroleum jelly, such as Vaseline, and a nonstick bandage. ¨ Apply more petroleum jelly and replace the bandage as needed. · Keep the wound dry for the first 24 to 48 hours. After this, you can shower if your doctor okays it.  Dominick Love the wound dry. · Be safe with medicines. Read and follow all instructions on the label. ¨ If the doctor gave you a prescription medicine for pain, take it as prescribed. ¨ If you are not taking a prescription pain medicine, ask your doctor if you can take an over-the-counter medicine. · If your doctor prescribed antibiotics, take them as directed. Do not stop taking them just because you feel better. You need to take the full course of antibiotics. · If you have stitches, do not remove them on your own. Your doctor will tell you when to come back to have them removed. · If you have Steri-Strips, leave them on until they fall off. · If possible, prop up the injured area on a pillow anytime you sit or lie down during the next 3 days. Try to keep it above the level of your heart. This will help reduce swelling. When should you call for help? Call your doctor now or seek immediate medical care if:  ? · You have new pain, or the pain gets worse. ? · The skin near the wound is cold or pale or changes color. ? · You have tingling, weakness, or numbness near the wound. ? · The wound starts to bleed, and blood soaks through the bandage. Oozing small amounts of blood is normal.   ? · You have symptoms of infection, such as:  ¨ Increased pain, swelling, warmth, or redness. ¨ Red streaks leading from the wound. ¨ Pus draining from the wound. ¨ A fever. ? Watch closely for changes in your health, and be sure to contact your doctor if:  ? · You do not get better as expected. Where can you learn more? Go to http://tabitha-yazmin.info/. Enter P342 in the search box to learn more about \"Wound Check: Care Instructions. \"  Current as of: March 20, 2017  Content Version: 11.4  © 9193-1607 [employer]. Care instructions adapted under license by [employer] (which disclaims liability or warranty for this information).  If you have questions about a medical condition or this instruction, always ask your healthcare professional. [place] [address] any warranty or liability for your use of this information.

## 2018-05-04 NOTE — ED PROVIDER NOTES
HPI Comments: 78-year-old male presents via EMS status post mechanical trip and fall at [facility] just prior to arrival.  Patient reports laceration to his nose and bruising and abrasions to his forehead. Patient denies loss of consciousness, numbness, tingling, weakness, bowel or bladder incontinence, trouble ambulating. Patient states he is on baby aspirin daily but denies being on any other for anticoagulation. Patient is a [de-identified] y.o. male presenting with fall. The history is provided by the patient. No  was used. Fall   The accident occurred less than 1 hour ago. The fall occurred while walking. He fell from a height of ground level. He landed on concrete. The volume of blood lost was minimal. The point of impact was the head. The pain is present in the head. The pain is at a severity of 3/10. The pain is mild. He was ambulatory at the scene. There was no entrapment after the fall. There was no drug use involved in the accident. There was no alcohol use involved in the accident. Associated symptoms include headaches and laceration. Pertinent negatives include no visual change, no fever, no numbness, no abdominal pain, no bowel incontinence, no nausea, no vomiting, no hematuria, no extremity weakness, no loss of consciousness and no tingling. He has tried nothing for the symptoms. The treatment provided no relief. It is unknown when the patient last had a tetanus shot.         Past Medical History:   Diagnosis Date    Diabetes ([facility] [address]     normal 100, checks QD, no hyposymptoms     GERD (gastroesophageal reflux disease)     Hypertension        Past Surgical History:   Procedure Laterality Date    HX COLONOSCOPY      HX ENDOSCOPY      HX ORTHOPAEDIC Left     knee, R hip          Family History:   Problem Relation Age of Onset    No Known Problems Mother     No Known Problems Father        Social History     Social History    Marital status:      Spouse name: N/A    Number of children: N/A    Years of education: N/A     Occupational History    Not on file. Social History Main Topics    Smoking status: Never Smoker    Smokeless tobacco: Not on file    Alcohol use No    Drug use: Not on file    Sexual activity: Not on file     Other Topics Concern    Not on file     Social History Narrative         ALLERGIES: Prednisone    Review of Systems   Constitutional: Negative for fever. HENT: Negative for dental problem, nosebleeds, sinus pressure, sneezing, tinnitus and trouble swallowing. Eyes: Negative for visual disturbance. Respiratory: Negative for cough and shortness of breath. Gastrointestinal: Negative for abdominal pain, bowel incontinence, nausea and vomiting. Genitourinary: Negative for hematuria. Musculoskeletal: Negative for extremity weakness, neck pain and neck stiffness. Skin: Positive for wound. Neurological: Positive for headaches. Negative for dizziness, tingling, loss of consciousness, syncope, speech difficulty, weakness and numbness. Vitals:    05/04/18 1234   BP: (!) 179/98   Pulse: 72   Resp: 18   Temp: 98.3 °F (36.8 °C)   SpO2: 93%   Weight: 81.6 kg (180 lb)   Height: 5' 10\" (1.778 m)            Physical Exam   Constitutional: He is oriented to person, place, and time. He appears well-developed and well-nourished. HENT:   Head: Normocephalic. Head is with abrasion and with laceration. Head is without raccoon's eyes, without Gutiérrez's sign, without right periorbital erythema and without left periorbital erythema. Right Ear: External ear normal.   Left Ear: External ear normal.   Nose: Nose lacerations and nasal deformity present. No nasal septal hematoma. No epistaxis. No foreign bodies. Mouth/Throat: Oropharynx is clear and moist. No oropharyngeal exudate. <2 cm superficial laceration noted to nose. No active bleeding. No foreign body.  No evidence of basilar skull fracture   Eyes: Conjunctivae and EOM are normal. Pupils are equal, round, and reactive to light. Neck: Normal range of motion. Neck supple. No JVD present. No tracheal deviation present. No midline C-spine tenderness. FROM   Cardiovascular: Normal rate, regular rhythm, normal heart sounds and intact distal pulses. Radial pulses 2+ and equal    Pulmonary/Chest: Effort normal and breath sounds normal. He exhibits no tenderness. CTAB. No chest wall tenderness to palpation. Abdominal: Soft. Bowel sounds are normal. He exhibits no distension and no mass. There is no tenderness. There is no rebound and no guarding. Musculoskeletal: Normal range of motion. He exhibits no edema, tenderness or deformity. No midline T-spine or L-spine tenderness to palpation. No deformity. No step-off. Full range of motion of all extremities   Neurological: He is alert and oriented to person, place, and time. No cranial nerve deficit. Coordination normal.   Strength 5/5 throughout. Normal sensory exam. No saddle anesthesia    Skin: Skin is warm and dry. Laceration noted. No rash noted. No erythema. Psychiatric: He has a normal mood and affect. His behavior is normal.   Nursing note and vitals reviewed. MDM  Number of Diagnoses or Management Options  Abrasion of forehead, initial encounter:   Closed fracture of nasal bone, initial encounter: new and requires workup  Fall, initial encounter: new and requires workup  Laceration of nose, initial encounter: new and requires workup  Diagnosis management comments: Vital signs stable. Patient awake alert and oriented ×4. CT max face with evidence of nasal bone fractures. Patient tolerated laceration repair well. Boostrix updated. CT cervical spine w/ no acute or concerning findings. Neuro intact. Facial trauma consulted. Dr. Rachel Berry recommends laceration closure follow-up within 7 days. Will discharge home with antibiotic, [place], and pain control. Patient given nasal/sinus precautions.         ED Course Comment By Time   CT max/face IMPRESSION: Nasal bone fractures. Susan Carlos MD 05/04 1521   CT head deviation of the nasal septum seen. Please see above comments. Víctor Zamorano MD 05/04 1521   CT cervical spine IMPRESSION: No acute osseous abnormality. Susan Carlos MD 05/04 1846       Wound Repair  Date/Time: 5/4/2018 6:08 PM  Performed by: attendingPreparation: skin prepped with Betadine  Location details: nose  Wound length:2.5 cm or less  Anesthesia: local infiltration    Anesthesia:  Local Anesthetic: lidocaine 1% without epinephrine  Foreign bodies: no foreign bodies  Irrigation solution: saline  Irrigation method: syringe  Debridement: none  Wound skin closure material used: 6-0 Ethilon   Number of sutures: 5  Technique: running and interrupted  Approximation: close  Dressing: 4x4  Patient tolerance: Patient tolerated the procedure well with no immediate complications  My total time at bedside, performing this procedure was 1-15 minutes.

## 2018-05-04 NOTE — ED NOTES
I have reviewed discharge instructions with the patient. The patient and daughter verbalized understanding. Patient left ED via Discharge Method: ambulatory to Home with daughter. Opportunity for questions and clarification provided. Patient given 3 scripts. To continue your aftercare when you leave the hospital, you may receive an automated call from our care team to check in on how you are doing. This is a free service and part of our promise to provide the best care and service to meet your aftercare needs.  If you have questions, or wish to unsubscribe from this service please call [phone]. Thank you for Choosing our \[facility] [place] [facility]\"" Emergency Department.

## 2018-05-04 NOTE — ED TRIAGE NOTES
Pt arrived via ems. Pt states he tripped and fell at [facility]. Pt has a laceration to his nose and bruising to the forehead. Pt denies any loc.

## 2021-02-04 ENCOUNTER — HOSPITAL ENCOUNTER (OUTPATIENT)
Dept: PHYSICAL THERAPY | Age: 81
Discharge: HOME | End: 2021-02-04
Payer: MEDICARE

## 2021-02-04 ENCOUNTER — HOSPITAL ENCOUNTER (OUTPATIENT)
Dept: PHYSICAL THERAPY | Age: 81
End: 2021-02-04
Payer: MEDICARE

## 2021-02-04 PROCEDURE — 97110 THERAPEUTIC EXERCISES: CPT

## 2021-02-04 PROCEDURE — 97161 PT EVAL LOW COMPLEX 20 MIN: CPT

## 2021-02-04 NOTE — THERAPY EVALUATION
Nehemiah Hodge  : 1940 [facility]  [address] [address]  [phone]   [phone]        OUTPATIENT PHYSICAL THERAPY:Initial Assessment 2021         ICD-10: Treatment Diagnosis: Difficulty in walking, not elsewhere classified (R26.2) and Other abnormalities of gait and mobility (R26.89)  Precautions/Allergies:   Prednisone   Fall Risk Score:     Ambulatory/Rehab Services H2 Model Falls Risk Assessment    Risk Factors:       (1)  Gender [Male] Ability to Rise from Chair:       (1)  Pushes up, successful in one attempt    Falls Prevention Plan:       No modifications necessary   Total: (5 or greater = High Risk): 2     [employer]. All Rights Reserved. [place] Patent #[id number]. Federal Law prohibits the replication, distribution or use without written permission from [facility]     MD Orders: evaluate and treat MEDICAL/REFERRING DIAGNOSIS:  Cerebrovascular accident (CVA), unspecified mechanism (HCC) [I63.9]  Balance disorder [R26.89]   DATE OF ONSET: 2020  REFERRING PHYSICIAN: Cristiane Bauer, *  RETURN PHYSICIAN APPOINTMENT: 3/5/20     INITIAL ASSESSMENT:  Mr. Hodge presents to therapy with impaired balance, weakness of the lower extremities, and difficulty walking. He specifically has decreased proprioception, diminished ankle righting reactions, and delayed stepping reactions leading to increased risk of falling. He has weakness through the quadriceps, calf musculature, and glute musculature contributing to decreased gait speed and decreased mobility. Impairments cause difficulty with walking and impaired safety with community mobility. Skilled therapy is required to return to prior level of function.    PROBLEM LIST (Impacting functional limitations):  1. Decreased Strength  2. Decreased ADL/Functional Activities  3. Decreased Transfer Abilities  4. Decreased Ambulation  Ability/Technique  5. Decreased Balance  6. Decreased Activity Tolerance  7. Decreased Flexibility/Joint Mobility INTERVENTIONS PLANNED:  1. Electrical Stimulation  2. Family Education  3. Gait Training  4. Home Exercise Program (HEP)  5. Manual Therapy  6. Neuromuscular Re-education/Strengthening  7. Range of Motion (ROM)  8. Therapeutic Activites  9. Therapeutic Exercise/Strengthening  10. modalities    TREATMENT PLAN:  Effective Dates: 2/4/2021 TO 5/6/2021 (90 days). Frequency/Duration: 2 times a week for 90 Days  GOALS: (Goals have been discussed and agreed upon with patient.)  Short-Term Functional Goals: Time Frame: 2 weeks  1. Patient will be independent with HEP. 2. Patient will be able to maintain tandem stance for 5 seconds to improve ankle righting reactions for balance. 3. Patient will improve 5x sit to stand to <20 seconds to decrease risk of falling. Discharge Goals: Time Frame: 8 weeks  1. Patient will be able to perform 5x sit to  <15 seconds to decrease risk of falling. 2. Patient will be able maintain single leg stance for >5 seconds to improve safety with mobility on all surfaces. 3. Patient will be able to walk for >10 minutes to improve community mobility. Rehabilitation Potential For Stated Goals: Excellent  Regarding Deacon Hodge's therapy, I certify that the treatment plan above will be carried out by a therapist or under their direction. Thank you for this referral,  Monica Fortune DPT       Referring Physician Signature: Yair Alonzo, *              Date                      HISTORY:   History of Present Injury/Illness (Reason for Referral):  Patient presents to therapy with primary complaint of impaired balance, decreased strength, and decreased energy. He had a pacemaker implanted in June of 2020 and notes he has continued to feel \"shaky and wobbly\" since.  He also notes increased difficulty with getting up and down from a chair, and that his energy is significantly decreased. He has fallen 2x in the past year. His daughter lives with him. He denies any specific pain, but does report some swelling in the left lower leg along with mild numbness in the left foot. He also has a history of TKA on the left from approximately 2 years ago that has done very well. Past Medical History/Comorbidities:   Mr. Josie Garland  has a past medical history of Diabetes ([address] GERD (gastroesophageal reflux disease), and Hypertension. He also has no past medical history of Difficult intubation, Malignant hyperthermia due to anesthesia, Nausea & vomiting, or Pseudocholinesterase deficiency. Mr. Josie Garland  has a past surgical history that includes hx colonoscopy; hx endoscopy; and hx orthopaedic (Left). Social History/Living Environment:     Pt lives at home with his daughter. Prior Level of Function/Work/Activity:  Patient is retired. He previously owned a body shop. Dominant Side:         RIGHT  Current Medications:    Current Outpatient Medications:     amLODIPine (NORVASC) 2.5 mg tablet, , Disp: , Rfl:     metFORMIN (GLUCOPHAGE) 500 mg tablet, , Disp: , Rfl:     omeprazole (PRILOSEC) 40 mg capsule, , Disp: , Rfl:     triamcinolone acetonide (KENALOG) 0.1 % topical cream, , Disp: , Rfl:     zolpidem (AMBIEN) 5 mg tablet, Take  by mouth nightly as needed for Sleep., Disp: , Rfl:     sucralfate (CARAFATE) 1 gram tablet, Take 1 g by mouth four (4) times daily. , Disp: , Rfl:    Date Last Reviewed:  2/4/2021   # of Personal Factors/Comorbidities that affect the Plan of Care: 0: LOW COMPLEXITY   EXAMINATION:   Observation/Orthostatic Postural Assessment:    No deformity or atrophy is noted. Ambulates with delayed gait speed. Palpation:    No specific palpable tenderness. 1+ edema present to the left shin.    ROM:    Ankle ROM is WNL  Knee ROM is WNL  Hip rotation is WNL  Hip extension is 0 ° B   Strength:   KNee extension: 4/5 B  Knee flexion: 4/5 B   SLR: 4/5 B  Hip abduction: 4/5 B  5x sit to stand: 25seconds  Special Tests:    Not applicable  Neurological Screen:  Grossly intact. Functional Mobility:   Ambulates with SPC in the RUE. Balance:    rhomberg with EO is normal, with EC increased postural sway. Maintains tandem stance for 3 seconds B; unable to perform single leg stance; 30second stool tap is 8x. Body Structures Involved:  1. Nerves  2. Bones  3. Joints  4. Muscles  5. Ligaments Body Functions Affected:  1. Sensory/Pain  2. Neuromusculoskeletal  3. Movement Related Activities and Participation Affected:  1. General Tasks and Demands  2. Mobility  3. Self Care  4. Domestic Life  5. Interpersonal Interactions and Relationships  6. Community, Social and [place]   # of elements that affect the Plan of Care: 1-2: LOW COMPLEXITY   CLINICAL PRESENTATION:   Presentation: Stable and uncomplicated: LOW COMPLEXITY   CLINICAL DECISION MAKING:   Tool Used: 5x sit to stand  Score:  Initial: 25seconds Most Recent: X (Date: -- )   Interpretation of Score: >13seconds indicates increased risk of falling. Outcome Measure Conversion Site            Medical Necessity:   · Patient is expected to demonstrate progress in strength, range of motion, balance and coordination  ·  to increase independence with community mobility and return to prior level of function  · .  Reason for Services/Other Comments:  · Patient has demonstrated an improvement in functional level by independent performance of HEP.    · .   Use of outcome tool(s) and clinical judgement create a POC that gives a: Clear prediction of patient's progress: LOW COMPLEXITY     Total Treatment Duration:  PT Patient Time In/Time Out  Time In: 1530  Time Out: [address], [facility]

## 2021-02-04 NOTE — PROGRESS NOTES
Carson Goodman  : 1940  [place]  [place]: [place] [facility] [address]  [phone]   [phone]      OUTPATIENT PHYSICAL THERAPY: Daily Treatment Note  2021    ICD-10: Treatment Diagnosis: Difficulty in walking, not elsewhere classified (R26.2) and Other abnormalities of gait and mobility (R26.89)  Effective Dates: 2021 TO 2021 (90 days). Frequency/Duration: 2 times a week for 90 Days  GOALS: (Goals have been discussed and agreed upon with patient.)  Short-Term Functional Goals: Time Frame: 2 weeks  1. Patient will be independent with HEP. 2. Patient will be able to maintain tandem stance for 5 seconds to improve ankle righting reactions for balance. 3. Patient will improve 5x sit to stand to <20 seconds to decrease risk of falling. Discharge Goals: Time Frame: 8 weeks  1. Patient will be able to perform 5x sit to  <15 seconds to decrease risk of falling. 2. Patient will be able maintain single leg stance for >5 seconds to improve safety with mobility on all surfaces. 3. Patient will be able to walk for >10 minutes to improve community mobility.    _________________________________________________________________________  Pre-treatment Symptoms/Complaints:  See initial evaluation  Pain: Initial: 0/10    md follow up 3/5/20 Post Session:  No increase/10   Medications Last Reviewed:  2021  Updated Objective Findings:  Below measures from initial evaluation unless otherwise noted. Observation/Orthostatic Postural Assessment:    No deformity or atrophy is noted. Ambulates with delayed gait speed. Palpation:    No specific palpable tenderness. 1+ edema present to the left shin.    ROM:    Ankle ROM is WNL  Knee ROM is WNL  Hip rotation is WNL  Hip extension is 0 ° B   Strength:   KNee extension: 4/5 B  Knee flexion: 4/5 B   SLR: 4/5 B  Hip abduction: 4/5 B  5x sit to stand: 25seconds  Special Tests:    Not applicable  Neurological Screen:  Grossly intact. Functional Mobility:   Ambulates with SPC in the RUE. Balance:    rhomberg with EO is normal, with EC increased postural sway. Maintains tandem stance for 3 seconds B; unable to perform single leg stance; 30second stool tap is 8x. TREATMENT:   THERAPEUTIC ACTIVITY: ( see below for minutes): Therapeutic activities per grid below to improve mobility, strength, balance and coordination. Required minimal visual, verbal, manual and tactile cues to improve independence and safety with daily activities . THERAPEUTIC EXERCISE: (see below for minutes):  Exercises per grid below to improve mobility, strength, balance and coordination. Required minimal verbal and manual cues to promote proper body alignment, promote proper body posture and promote proper body mechanics. Progressed resistance, range, repetitions and complexity of movement as indicated. MANUAL THERAPY: (see below for minutes): Joint mobilization and Soft tissue mobilization was utilized and necessary because of the patient's restricted joint motion, painful spasm, loss of articular motion and restricted motion of soft tissue. MODALITIES: (see below for minutes):      to decrease pain    SELF CARE: (see below for minutes): Procedure(s) (per grid) utilized to improve and/or restore self-care/home management as related to dressing, bathing and grooming. Required minimal verbal cueing to facilitate activities of daily living skills and compensatory activities.      Date: 2/4/21       Modalities:                                Therapeutic Exercise: 25 min        Sit to stand 30x       Standing marching 3x10 B       Calf raises 20x       nustep Next visit       biosway Next visit       Lateral band walks Next visit       Proprioceptive Activities:                                Manual Therapy:                        Therapeutic Activities:                                  HEP: see 2/4/21 flow sheet for specifics. [facility]  Treatment/Session Summary:    · Response to Treatment:  Patient is independent with performance of above described home program.  · Communication/Consultation:  None today  · Equipment provided today:  None today  · Recommendations/Intent for next treatment session: Next visit will focus on progression of strength and return to prior level of function.     Total Treatment Billable Duration:  45 minutes  PT Patient Time In/Time Out  Time In: 3840  Time Out: [address], DPT    Future Appointments   Date Time Provider Myla Li   2/10/2021 11:00 AM Dileep Hernandez DPT [facility]   2/12/2021 11:45 AM [facility] Longs, [facility]   2/15/2021  3:30 PM Media Longs, PTA [facility]   2/17/2021  2:45 PM Media Longs, PTA [facility]   2/23/2021  3:30 PM Media Longs, PTA [facility]   2/25/2021 11:45 AM Media Longs, PTA [facility]

## 2021-02-10 ENCOUNTER — HOSPITAL ENCOUNTER (OUTPATIENT)
Dept: PHYSICAL THERAPY | Age: 81
Discharge: HOME | End: 2021-02-10
Payer: MEDICARE

## 2021-02-10 PROCEDURE — 97110 THERAPEUTIC EXERCISES: CPT

## 2021-02-10 NOTE — PROGRESS NOTES
Josephine Joshi  : 1940  [place]  Secondary: [place] @ [address], [address] [place] [address]  [phone]   [phone]      OUTPATIENT PHYSICAL THERAPY: Daily Treatment Note  2/10/2021    ICD-10: Treatment Diagnosis: Difficulty in walking, not elsewhere classified (R26.2) and Other abnormalities of gait and mobility (R26.89)  Effective Dates: 2021 TO 2021 (90 days). Frequency/Duration: 2 times a week for 90 Days  GOALS: (Goals have been discussed and agreed upon with patient.)  Short-Term Functional Goals: Time Frame: 2 weeks  1. Patient will be independent with HEP. 2. Patient will be able to maintain tandem stance for 5 seconds to improve ankle righting reactions for balance. 3. Patient will improve 5x sit to stand to <20 seconds to decrease risk of falling. Discharge Goals: Time Frame: 8 weeks  1. Patient will be able to perform 5x sit to  <15 seconds to decrease risk of falling. 2. Patient will be able maintain single leg stance for >5 seconds to improve safety with mobility on all surfaces. 3. Patient will be able to walk for >10 minutes to improve community mobility.    _________________________________________________________________________  Pre-treatment Symptoms/Complaints: \"The exercises are going well. I've been trying to do more at home. I think I'm getting a little stronger. \"  Pain: Initial: 0/10    md follow up 3/5/20 Post Session:  No increase/10   Medications Last Reviewed:  2/10/2021  Updated Objective Findings:  Below measures from initial evaluation unless otherwise noted. Observation/Orthostatic Postural Assessment:    No deformity or atrophy is noted. Ambulates with delayed gait speed. Palpation:    No specific palpable tenderness. 1+ edema present to the left shin.    ROM:    Ankle ROM is WNL  Knee ROM is WNL  Hip rotation is WNL  Hip extension is 0 ° B   Strength:   KNee extension: 4/5 B  Knee flexion: 4/5 B   SLR: 4/5 B  Hip abduction: 4/5 B  5x sit to stand: 25seconds  Special Tests:    Not applicable  Neurological Screen:  Grossly intact. Functional Mobility:   Ambulates with SPC in the RUE. Balance:    rhomberg with EO is normal, with EC increased postural sway. Maintains tandem stance for 3 seconds B; unable to perform single leg stance; 30second stool tap is 8x. TREATMENT:   THERAPEUTIC ACTIVITY: ( see below for minutes): Therapeutic activities per grid below to improve mobility, strength, balance and coordination. Required minimal visual, verbal, manual and tactile cues to improve independence and safety with daily activities . THERAPEUTIC EXERCISE: (see below for minutes):  Exercises per grid below to improve mobility, strength, balance and coordination. Required minimal verbal and manual cues to promote proper body alignment, promote proper body posture and promote proper body mechanics. Progressed resistance, range, repetitions and complexity of movement as indicated. MANUAL THERAPY: (see below for minutes): Joint mobilization and Soft tissue mobilization was utilized and necessary because of the patient's restricted joint motion, painful spasm, loss of articular motion and restricted motion of soft tissue. MODALITIES: (see below for minutes):      to decrease pain    SELF CARE: (see below for minutes): Procedure(s) (per grid) utilized to improve and/or restore self-care/home management as related to dressing, bathing and grooming. Required minimal verbal cueing to facilitate activities of daily living skills and compensatory activities.      Date: 2/4/21 2/10/21 (visit 2)      Modalities:                                Therapeutic Exercise: 25 min  45 min       Sit to stand 30x 30x (final 2 sets with airex in chair)      Standing marching 3x10 B [id number]      Calf raises 20x       nustep Next visit 5 min with intermittent rest      biosway Next visit Fwd/back wt shift, lateral wt shift x3 min each      Tandem walking  2x6 laps      airex  Fwd step on/off 2x10 B                              Lateral band walks Next visit       Proprioceptive Activities:                                Manual Therapy:                        Therapeutic Activities:                                  HEP: see 2/4/21 flow sheet for specifics. [employer]  Treatment/Session Summary:    · Response to Treatment:  With tandem walking and balance drills he continues to require use of UE support. Continues to require use of hands for assistance with sit to stand. · Communication/Consultation:  None today  · Equipment provided today:  None today  · Recommendations/Intent for next treatment session: Next visit will focus on progression of strength and return to prior level of function.     Total Treatment Billable Duration:  45 minutes  PT Patient Time In/Time Out  Time In: 1100  Time Out: Priyanka 36, DPT    Future Appointments   Date Time Provider Myla Li   2/12/2021 11:45 AM Vale Leep, PTA [facility]   2/15/2021  3:30 PM Allan Leep, PTA [facility]   2/17/2021  2:45 PM Vale Leep, PTA [facility]   2/23/2021  3:30 PM Vale Leep, PTA [facility]   2/25/2021 11:45 AM Allan Leep, PTA [facility]

## 2021-02-12 ENCOUNTER — HOSPITAL ENCOUNTER (OUTPATIENT)
Dept: PHYSICAL THERAPY | Age: 81
Discharge: HOME | End: 2021-02-12
Payer: MEDICARE

## 2021-02-12 PROCEDURE — 97110 THERAPEUTIC EXERCISES: CPT

## 2021-02-12 NOTE — PROGRESS NOTES
Tomasa Yoon  : 1940  [place]  Secondary: [place] @ [address], [address] [place] [address]  [phone]   [phone]      OUTPATIENT PHYSICAL THERAPY: Daily Treatment Note  2021    ICD-10: Treatment Diagnosis: Difficulty in walking, not elsewhere classified (R26.2) and Other abnormalities of gait and mobility (R26.89)  Effective Dates: 2021 TO 2021 (90 days). Frequency/Duration: 2 times a week for 90 Days  GOALS: (Goals have been discussed and agreed upon with patient.)  Short-Term Functional Goals: Time Frame: 2 weeks  1. Patient will be independent with HEP. 2. Patient will be able to maintain tandem stance for 5 seconds to improve ankle righting reactions for balance. 3. Patient will improve 5x sit to stand to <20 seconds to decrease risk of falling. Discharge Goals: Time Frame: 8 weeks  1. Patient will be able to perform 5x sit to  <15 seconds to decrease risk of falling. 2. Patient will be able maintain single leg stance for >5 seconds to improve safety with mobility on all surfaces. 3. Patient will be able to walk for >10 minutes to improve community mobility.    _________________________________________________________________________  Pre-treatment Symptoms/Complaints:  No reports of pain prior to treatment. Pain: Initial: 0/10    md follow up 3/5/20 Post Session:  No increase/10   Medications Last Reviewed:  2021  Updated Objective Findings:  Below measures from initial evaluation unless otherwise noted. Observation/Orthostatic Postural Assessment:    No deformity or atrophy is noted. Ambulates with delayed gait speed. Palpation:    No specific palpable tenderness. 1+ edema present to the left shin.    ROM:    Ankle ROM is WNL  Knee ROM is WNL  Hip rotation is WNL  Hip extension is 0 ° B   Strength:   KNee extension: 4/5 B  Knee flexion: 4/5 B SLR: 4/5 B  Hip abduction: 4/5 B  5x sit to stand: 25seconds  Special Tests:    Not applicable  Neurological Screen:  Grossly intact. Functional Mobility:   Ambulates with SPC in the RUE. Balance:    rhomberg with EO is normal, with EC increased postural sway. Maintains tandem stance for 3 seconds B; unable to perform single leg stance; 30second stool tap is 8x. TREATMENT:   THERAPEUTIC ACTIVITY: ( see below for minutes): Therapeutic activities per grid below to improve mobility, strength, balance and coordination. Required minimal visual, verbal, manual and tactile cues to improve independence and safety with daily activities . THERAPEUTIC EXERCISE: (see below for minutes):  Exercises per grid below to improve mobility, strength, balance and coordination. Required minimal verbal and manual cues to promote proper body alignment, promote proper body posture and promote proper body mechanics. Progressed resistance, range, repetitions and complexity of movement as indicated. MANUAL THERAPY: (see below for minutes): Joint mobilization and Soft tissue mobilization was utilized and necessary because of the patient's restricted joint motion, painful spasm, loss of articular motion and restricted motion of soft tissue. MODALITIES: (see below for minutes):      to decrease pain    SELF CARE: (see below for minutes): Procedure(s) (per grid) utilized to improve and/or restore self-care/home management as related to dressing, bathing and grooming. Required minimal verbal cueing to facilitate activities of daily living skills and compensatory activities. Date: 2/4/21 2/10/21 (visit 2) 2/12/21 (visit 3)     Modalities: PACEMAKER!                                 Therapeutic Exercise: 25 min  45 min  45 min     Sit to stand 30x 30x (final 2 sets with airex in chair) 3x10 with one pad and UE support     Standing marching 3x10 B [id number] 4 laps with hold in // bars     Calf raises 20x  x30     nustep Next visit 5 min with intermittent rest L5 6 min with rest breaks     biosway Next visit Fwd/back wt shift, lateral wt shift x3 min each      Tandem walking  2x6 laps 4 laps in // bars     airex  Fwd step on/off 2x10 B Standing eyes open and eyes closed: 1 min each and head turns x10. Intermittent UE support                             Lateral band walks Next visit  4 laps in // bars no resistance     Proprioceptive Activities:                                Manual Therapy:                        Therapeutic Activities:                                  HEP: see 2/4/21 flow sheet for specifics. [employer] [facility]  Treatment/Session Summary:    · Response to Treatment: Pt continues to have difficulty transferring from sitting to standing without UE support. He also reports dizziness with head turns while standing. Continue to progress balance activities. · Communication/Consultation:  None today  · Equipment provided today:  None today  · Recommendations/Intent for next treatment session: Next visit will focus on progression of strength and return to prior level of function.     Total Treatment Billable Duration:  45 minutes  PT Patient Time In/Time Out  Time In: 4145  Time Out: 1330 [facility] 231, PTA    Future Appointments   Date Time Provider Myla Li   2/15/2021  3:30 PM Cori German [facility]   2/17/2021  2:45 PM Gege Wall PTA [facility]   2/23/2021  3:30 PM Gege Wall PTA [facility]   2/25/2021 11:45 AM Cristobal Wagner [facility]

## 2021-02-15 ENCOUNTER — HOSPITAL ENCOUNTER (OUTPATIENT)
Dept: PHYSICAL THERAPY | Age: 81
Discharge: HOME | End: 2021-02-15
Payer: MEDICARE

## 2021-02-15 PROCEDURE — 97110 THERAPEUTIC EXERCISES: CPT

## 2021-02-15 NOTE — PROGRESS NOTES
Prakash Josue  : 1940  [place]  Secondary: [place] @ [address], [address] [place] [address]  [phone]   [phone]      OUTPATIENT PHYSICAL THERAPY: Daily Treatment Note  2/15/2021    ICD-10: Treatment Diagnosis: Difficulty in walking, not elsewhere classified (R26.2) and Other abnormalities of gait and mobility (R26.89)  Effective Dates: 2021 TO 2021 (90 days). Frequency/Duration: 2 times a week for 90 Days  GOALS: (Goals have been discussed and agreed upon with patient.)  Short-Term Functional Goals: Time Frame: 2 weeks  1. Patient will be independent with HEP. 2. Patient will be able to maintain tandem stance for 5 seconds to improve ankle righting reactions for balance. 3. Patient will improve 5x sit to stand to <20 seconds to decrease risk of falling. Discharge Goals: Time Frame: 8 weeks  1. Patient will be able to perform 5x sit to  <15 seconds to decrease risk of falling. 2. Patient will be able maintain single leg stance for >5 seconds to improve safety with mobility on all surfaces. 3. Patient will be able to walk for >10 minutes to improve community mobility.    _________________________________________________________________________  Pre-treatment Symptoms/Complaints:  No reports of pain prior to treatment. Pain: Initial: 0/10    md follow up 3/5/20 Post Session:  No increase/10   Medications Last Reviewed:  2/15/2021  Updated Objective Findings:  Below measures from initial evaluation unless otherwise noted. Observation/Orthostatic Postural Assessment:    No deformity or atrophy is noted. Ambulates with delayed gait speed. Palpation:    No specific palpable tenderness. 1+ edema present to the left shin.    ROM:    Ankle ROM is WNL  Knee ROM is WNL  Hip rotation is WNL  Hip extension is 0 ° B   Strength:   KNee extension: 4/5 B  Knee flexion: 4/5 B SLR: 4/5 B  Hip abduction: 4/5 B  5x sit to stand: 25seconds  Special Tests:    Not applicable  Neurological Screen:  Grossly intact. Functional Mobility:   Ambulates with SPC in the RUE. Balance:    rhomberg with EO is normal, with EC increased postural sway. Maintains tandem stance for 3 seconds B; unable to perform single leg stance; 30second stool tap is 8x. TREATMENT:   THERAPEUTIC ACTIVITY: ( see below for minutes): Therapeutic activities per grid below to improve mobility, strength, balance and coordination. Required minimal visual, verbal, manual and tactile cues to improve independence and safety with daily activities . THERAPEUTIC EXERCISE: (see below for minutes):  Exercises per grid below to improve mobility, strength, balance and coordination. Required minimal verbal and manual cues to promote proper body alignment, promote proper body posture and promote proper body mechanics. Progressed resistance, range, repetitions and complexity of movement as indicated. MANUAL THERAPY: (see below for minutes): Joint mobilization and Soft tissue mobilization was utilized and necessary because of the patient's restricted joint motion, painful spasm, loss of articular motion and restricted motion of soft tissue. MODALITIES: (see below for minutes):      to decrease pain    SELF CARE: (see below for minutes): Procedure(s) (per grid) utilized to improve and/or restore self-care/home management as related to dressing, bathing and grooming. Required minimal verbal cueing to facilitate activities of daily living skills and compensatory activities. Date: 2/4/21 2/10/21 (visit 2) 2/12/21 (visit 3) 2/15/21 (visit 4)    Modalities: PACEMAKER!                                 Therapeutic Exercise: 25 min  45 min  45 min 45 min    Sit to stand 30x 30x (final 2 sets with airex in chair) 3x10 with one pad and UE support 3x10 one pad and UE support    Standing marching 3x10 B [id number] 4 laps with hold in // bars 4 laps with UE support    Calf raises 20x  x30     nustep Next visit 5 min with intermittent rest L5 6 min with rest breaks L4 5 min with one rest break    biosway Next visit Fwd/back wt shift, lateral wt shift x3 min each      Tandem walking  2x6 laps 4 laps in // bars 4 laps in // bars    airex  Fwd step on/off 2x10 B Standing eyes open and eyes closed: 1 min each and head turns x10. Intermittent UE support     Step ups    Up and over (lateral) and step up and down x15 each with UE support, 6 in                    Lateral band walks Next visit  4 laps in // bars no resistance 4 laps black CLX band    Proprioceptive Activities:                                Manual Therapy:                        Therapeutic Activities:                                  HEP: see 2/4/21 flow sheet for specifics. [employer] Portal  Treatment/Session Summary:    · Response to Treatment: Pt reported shortness of breath and took one rest break during the step ups. Pt requires the use of his UEs for balance. Continue to progress balance activities. · Communication/Consultation:  None today  · Equipment provided today:  None today  · Recommendations/Intent for next treatment session: Next visit will focus on progression of strength and return to prior level of function.     Total Treatment Billable Duration:  45 minutes  PT Patient Time In/Time Out  Time In: 0330  Time Out: Jesus Quiroga PTA    Future Appointments   Date Time Provider Myla Li   2/17/2021  2:45 PM Lona Sos, [place] [facility]   2/23/2021  3:30 PM Lnoa Sos, PTA [facility]   2/25/2021 11:45 AM CH[facility]T [facility]   3/1/2021  2:45 PM Lona Sos, PTA [facility]   3/3/2021  2:45 PM Lona Sos, PTA [facility]   3/8/2021  2:45 PM Lona Sos, PTA [facility]   3/10/2021  2:45 PM Lona Sos, PTA [facility]   3/16/2021 11:00 AM Gage Healy Bertha Johnson, DPT [facility]   3/18/2021 11:00 AM [facility]iga, [facility]   3/22/2021  2:45 PM Claudean Darby, PTA [facility]   3/24/2021  2:45 PM Claudean Darby, [facility]   3/29/2021  2:45 PM Claudean Darby, [facility]   3/31/2021  2:45 PM Claudean Darby, PTA [facility]

## 2021-02-17 ENCOUNTER — HOSPITAL ENCOUNTER (OUTPATIENT)
Dept: PHYSICAL THERAPY | Age: 81
Discharge: HOME | End: 2021-02-17
Payer: MEDICARE

## 2021-02-17 PROCEDURE — 97110 THERAPEUTIC EXERCISES: CPT

## 2021-02-17 NOTE — PROGRESS NOTES
Rehan Manriquez  : 1940  [place]  Secondary: [place] [facility] [address]  [phone]   [phone]      OUTPATIENT PHYSICAL THERAPY: Daily Treatment Note  2021    ICD-10: Treatment Diagnosis: Difficulty in walking, not elsewhere classified (R26.2) and Other abnormalities of gait and mobility (R26.89)  Effective Dates: 2021 TO 2021 (90 days). Frequency/Duration: 2 times a week for 90 Days  GOALS: (Goals have been discussed and agreed upon with patient.)  Short-Term Functional Goals: Time Frame: 2 weeks  1. Patient will be independent with HEP. 2. Patient will be able to maintain tandem stance for 5 seconds to improve ankle righting reactions for balance. 3. Patient will improve 5x sit to stand to <20 seconds to decrease risk of falling. Discharge Goals: Time Frame: 8 weeks  1. Patient will be able to perform 5x sit to  <15 seconds to decrease risk of falling. 2. Patient will be able maintain single leg stance for >5 seconds to improve safety with mobility on all surfaces. 3. Patient will be able to walk for >10 minutes to improve community mobility.    _________________________________________________________________________  Pre-treatment Symptoms/Complaints:  No reports of pain prior to treatment. Pain: Initial: 0/10    md follow up 3/5/20 Post Session:  No increase/10   Medications Last Reviewed:  2021  Updated Objective Findings:  Below measures from initial evaluation unless otherwise noted. Observation/Orthostatic Postural Assessment:    No deformity or atrophy is noted. Ambulates with delayed gait speed. Palpation:    No specific palpable tenderness. 1+ edema present to the left shin.    ROM:    Ankle ROM is WNL  Knee ROM is WNL  Hip rotation is WNL  Hip extension is 0 ° B   Strength:   KNee extension: 4/5 B  Knee flexion: 4/5 B SLR: 4/5 B  Hip abduction: 4/5 B  5x sit to stand: 25seconds  Special Tests:    Not applicable  Neurological Screen:  Grossly intact. Functional Mobility:   Ambulates with SPC in the RUE. Balance:    rhomberg with EO is normal, with EC increased postural sway. Maintains tandem stance for 3 seconds B; unable to perform single leg stance; 30second stool tap is 8x. TREATMENT:   THERAPEUTIC ACTIVITY: ( see below for minutes): Therapeutic activities per grid below to improve mobility, strength, balance and coordination. Required minimal visual, verbal, manual and tactile cues to improve independence and safety with daily activities . THERAPEUTIC EXERCISE: (see below for minutes):  Exercises per grid below to improve mobility, strength, balance and coordination. Required minimal verbal and manual cues to promote proper body alignment, promote proper body posture and promote proper body mechanics. Progressed resistance, range, repetitions and complexity of movement as indicated. MANUAL THERAPY: (see below for minutes): Joint mobilization and Soft tissue mobilization was utilized and necessary because of the patient's restricted joint motion, painful spasm, loss of articular motion and restricted motion of soft tissue. MODALITIES: (see below for minutes):      to decrease pain    SELF CARE: (see below for minutes): Procedure(s) (per grid) utilized to improve and/or restore self-care/home management as related to dressing, bathing and grooming. Required minimal verbal cueing to facilitate activities of daily living skills and compensatory activities. Date: 2/4/21 2/10/21 (visit 2) 2/12/21 (visit 3) 2/15/21 (visit 4) 2/17/21 (visit 5)   Modalities: PACEMAKER!                                 Therapeutic Exercise: 25 min  45 min  45 min 45 min 45 min   Sit to stand 30x 30x (final 2 sets with airex in chair) 3x10 with one pad and UE support 3x10 one pad and UE support 3x10 with UE support   Standing marching 3x10 B [id number] 4 laps with hold in // bars 4 laps with UE support    Calf raises 20x  x30  x30 and DF 3x10   nustep Next visit 5 min with intermittent rest L5 6 min with rest breaks L4 5 min with one rest break L2 5 min with one rest break   biosway Next visit Fwd/back wt shift, lateral wt shift x3 min each      Tandem walking  2x6 laps 4 laps in // bars 4 laps in // bars    airex  Fwd step on/off 2x10 B Standing eyes open and eyes closed: 1 min each and head turns x10. Intermittent UE support  EC 1 min x2 on foam with one finger on bar   Step ups    Up and over (lateral) and step up and down x15 each with UE support, 6 in Forward 2x15 and lateral up and over x15 on 6 in box with UE support   Split stance with trunk rotation     Next visit   Obstacle course     Next visit   Hip 3 way with band     x15 ea direction B LE, green    Lateral band walks Next visit  4 laps in // bars no resistance 4 laps black CLX band 4 laps with green band   Proprioceptive Activities:                                Manual Therapy:                        Therapeutic Activities:                                  HEP: see 2/4/21 flow sheet for specifics. [employer] Portal  Treatment/Session Summary:    · Response to Treatment: Pt reported shortness of breath while riding the bike. Pt demonstrates good balance and will progress to dynamic balance activities next visit. Continue to progress balance activities. · Communication/Consultation:  None today  · Equipment provided today:  None today  · Recommendations/Intent for next treatment session: Next visit will focus on progression of strength and return to prior level of function.     Total Treatment Billable Duration:  45 minutes  PT Patient Time In/Time Out  Time In: 7705  Time Out: [address], PTA    Future Appointments   Date Time Provider Myla Li   2/17/2021  2:45 PM Cori Proctor [facility]   2/23/2021  3:30 PM Johnathon Gutierrez PTA [facility]   2/25/2021 11:45 AM Dileep Hernandez, [facility]   3/1/2021  3:30 PM Media Longs, PTA [facility]   3/3/2021  3:30 PM Media Longs, PTA [facility]   3/8/2021  3:30 PM Media Longs, PTA [facility]   3/10/2021  2:45 PM Media Longs, PTA [facility]   3/16/2021 11:00 AM Dileep Hernandez, [facility]   3/18/2021 11:00 AM Dileep Hernandez, [facility]   3/22/2021  3:30 PM Media Longs, PTA [facility]   3/24/2021  3:30 PM Media Longs, PTA [facility]   3/29/2021  3:30 PM Media Longs, PTA [facility]   3/31/2021  3:30 PM Media Longs, PTA [facility]

## 2021-02-23 ENCOUNTER — HOSPITAL ENCOUNTER (OUTPATIENT)
Dept: PHYSICAL THERAPY | Age: 81
Discharge: HOME | End: 2021-02-23
Payer: MEDICARE

## 2021-02-23 PROCEDURE — 97110 THERAPEUTIC EXERCISES: CPT

## 2021-02-23 NOTE — PROGRESS NOTES
Michael Acevedo  : 1940  [place]  Secondary: [place] @ [address], [address] [place] [address]  [phone]   [phone]      OUTPATIENT PHYSICAL THERAPY: Daily Treatment Note  2021    ICD-10: Treatment Diagnosis: Difficulty in walking, not elsewhere classified (R26.2) and Other abnormalities of gait and mobility (R26.89)  Effective Dates: 2021 TO 2021 (90 days). Frequency/Duration: 2 times a week for 90 Days  GOALS: (Goals have been discussed and agreed upon with patient.)  Short-Term Functional Goals: Time Frame: 2 weeks  1. Patient will be independent with HEP. 2. Patient will be able to maintain tandem stance for 5 seconds to improve ankle righting reactions for balance. 3. Patient will improve 5x sit to stand to <20 seconds to decrease risk of falling. Discharge Goals: Time Frame: 8 weeks  1. Patient will be able to perform 5x sit to  <15 seconds to decrease risk of falling. 2. Patient will be able maintain single leg stance for >5 seconds to improve safety with mobility on all surfaces. 3. Patient will be able to walk for >10 minutes to improve community mobility.    _________________________________________________________________________  Pre-treatment Symptoms/Complaints:  No reports of pain prior to treatment. Pain: Initial: 0/10    md follow up 3/5/20 Post Session:  No increase/10   Medications Last Reviewed:  2021  Updated Objective Findings:  Below measures from initial evaluation unless otherwise noted. Observation/Orthostatic Postural Assessment:    No deformity or atrophy is noted. Ambulates with delayed gait speed. Palpation:    No specific palpable tenderness. 1+ edema present to the left shin.    ROM:    Ankle ROM is WNL  Knee ROM is WNL  Hip rotation is WNL  Hip extension is 0 ° B   Strength:   KNee extension: 4/5 B  Knee flexion: 4/5 B SLR: 4/5 B  Hip abduction: 4/5 B  5x sit to stand: 25seconds  Special Tests:    Not applicable  Neurological Screen:  Grossly intact. Functional Mobility:   Ambulates with SPC in the RUE. Balance:    rhomberg with EO is normal, with EC increased postural sway. Maintains tandem stance for 3 seconds B; unable to perform single leg stance; 30second stool tap is 8x. TREATMENT:   THERAPEUTIC ACTIVITY: ( see below for minutes): Therapeutic activities per grid below to improve mobility, strength, balance and coordination. Required minimal visual, verbal, manual and tactile cues to improve independence and safety with daily activities . THERAPEUTIC EXERCISE: (see below for minutes):  Exercises per grid below to improve mobility, strength, balance and coordination. Required minimal verbal and manual cues to promote proper body alignment, promote proper body posture and promote proper body mechanics. Progressed resistance, range, repetitions and complexity of movement as indicated. MANUAL THERAPY: (see below for minutes): Joint mobilization and Soft tissue mobilization was utilized and necessary because of the patient's restricted joint motion, painful spasm, loss of articular motion and restricted motion of soft tissue. MODALITIES: (see below for minutes):      to decrease pain    SELF CARE: (see below for minutes): Procedure(s) (per grid) utilized to improve and/or restore self-care/home management as related to dressing, bathing and grooming. Required minimal verbal cueing to facilitate activities of daily living skills and compensatory activities. Date: 2/4/21 2/10/21 (visit 2) 2/12/21 (visit 3) 2/15/21 (visit 4) 2/17/21 (visit 5) 2/23/21 (visit 6)   Modalities: PACEMAKER!                                     Therapeutic Exercise: 25 min  45 min  45 min 45 min 45 min 45 min   Sit to stand 30x 30x (final 2 sets with airex in chair) 3x10 with one pad and UE support 3x10 one pad and UE support 3x10 with UE support x10 with UE support   Standing marching 3x10 B [id number] 4 laps with hold in // bars 4 laps with UE support     Calf raises 20x  x30  x30 and DF 3x10    nustep Next visit 5 min with intermittent rest L5 6 min with rest breaks L4 5 min with one rest break L2 5 min with one rest break 5 min L4 with one rest break   biosway Next visit Fwd/back wt shift, lateral wt shift x3 min each       Tandem walking  2x6 laps 4 laps in // bars 4 laps in // bars  4 laps in // bars   airex  Fwd step on/off 2x10 B Standing eyes open and eyes closed: 1 min each and head turns x10. Intermittent UE support  EC 1 min x2 on foam with one finger on bar Single leg stance 30 sec hold x 6 B with UE support   Step ups    Up and over (lateral) and step up and down x15 each with UE support, 6 in Forward 2x15 and lateral up and over x15 on 6 in box with UE support    Split stance with trunk rotation     Next visit With B trunk rotation x20 with 1 kg ball and biceps curls 2x10, 1#   Obstacle course     Next visit 6 hurdles, 4 in box and foam pad x3 laps each forward and lateral with CGA to supervision   Hip 3 way with band     x15 ea direction B LE, green     Lateral band walks Next visit  4 laps in // bars no resistance 4 laps black CLX band 4 laps with green band 4 laps in // bars with thick green band   Proprioceptive Activities:                                    Manual Therapy:                           Therapeutic Activities:                                      HEP: see 2/4/21 flow sheet for specifics. [facility]  Treatment/Session Summary:    · Response to Treatment: Pt was able to perform dynamic and static balance activities safely. Continue to progress balance activities. Pt only took one rest break.   · Communication/Consultation:  None today  · Equipment provided today:  None today  · Recommendations/Intent for next treatment session: Next visit will focus on progression of strength and return to prior level of function.     Total Treatment Billable Duration:  45 minutes  PT Patient Time In/Time Out  Time In: 0330  Time Out: Jesus Quiroga, PTA    Future Appointments   Date Time Provider Myla Li   2/25/2021 11:45 AM Cristobal Wagner, DPT [facility]   3/1/2021  3:30 PM Deloras Mooreland, PTA [facility]   3/3/2021  3:30 PM Deloras Mooreland, PTA [facility]   3/8/2021  3:30 PM Deloras Mooreland, PTA [facility]   3/10/2021  2:45 PM Deloras Mooreland, PTA [facility]   3/16/2021 11:00 AM Cristobal Wagner, DPT [facility]   3/18/2021 11:00 AM Cristobal Wagner, DPT [facility]   3/22/2021  3:30 PM Deloras Mooreland, PTA [facility]   3/24/2021  3:30 PM Deloras Mooreland, PTA [facility]   3/29/2021  3:30 PM Deloras Mooreland, PTA [facility]   3/31/2021  3:30 PM Deloras Mooreland, PTA [facility]

## 2021-02-25 ENCOUNTER — HOSPITAL ENCOUNTER (OUTPATIENT)
Dept: PHYSICAL THERAPY | Age: 81
Discharge: HOME | End: 2021-02-25
Payer: MEDICARE

## 2021-02-25 PROCEDURE — 97110 THERAPEUTIC EXERCISES: CPT

## 2021-02-25 NOTE — PROGRESS NOTES
Kentrell Richey  : 1940  [place]  Secondary: [place] [facility] [address]  [phone]   [phone]      OUTPATIENT PHYSICAL THERAPY: Daily Treatment Note  2021    ICD-10: Treatment Diagnosis: Difficulty in walking, not elsewhere classified (R26.2) and Other abnormalities of gait and mobility (R26.89)  Effective Dates: 2021 TO 2021 (90 days). Frequency/Duration: 2 times a week for 90 Days  GOALS: (Goals have been discussed and agreed upon with patient.)  Short-Term Functional Goals: Time Frame: 2 weeks  1. Patient will be independent with HEP. 2. Patient will be able to maintain tandem stance for 5 seconds to improve ankle righting reactions for balance. 3. Patient will improve 5x sit to stand to <20 seconds to decrease risk of falling. Discharge Goals: Time Frame: 8 weeks  1. Patient will be able to perform 5x sit to  <15 seconds to decrease risk of falling. 2. Patient will be able maintain single leg stance for >5 seconds to improve safety with mobility on all surfaces. 3. Patient will be able to walk for >10 minutes to improve community mobility.    _________________________________________________________________________  Pre-treatment Symptoms/Complaints:  Pt reports he is feeling like he is beginning to have more energy and is feeling more stable on his feet. Pain: Initial: 0/10    md follow up 3/5/20 Post Session:  No increase/10   Medications Last Reviewed:  2021  Updated Objective Findings:  Below measures from initial evaluation unless otherwise noted. Observation/Orthostatic Postural Assessment:    No deformity or atrophy is noted. Ambulates with delayed gait speed. Palpation:    No specific palpable tenderness. 1+ edema present to the left shin.    ROM:    Ankle ROM is WNL  Knee ROM is WNL  Hip rotation is WNL  Hip extension is 0 ° B   Strength:   KNee extension: 4/5 B  Knee flexion: 4/5 B   SLR: 4/5 B  Hip abduction: 4/5 B  5x sit to stand: 25seconds  Special Tests:    Not applicable  Neurological Screen:  Grossly intact. Functional Mobility:   Ambulates with SPC in the RUE. Balance:    rhomberg with EO is normal, with EC increased postural sway. Maintains tandem stance for 3 seconds B; unable to perform single leg stance; 30second stool tap is 8x. TREATMENT:   THERAPEUTIC ACTIVITY: ( see below for minutes): Therapeutic activities per grid below to improve mobility, strength, balance and coordination. Required minimal visual, verbal, manual and tactile cues to improve independence and safety with daily activities . THERAPEUTIC EXERCISE: (see below for minutes):  Exercises per grid below to improve mobility, strength, balance and coordination. Required minimal verbal and manual cues to promote proper body alignment, promote proper body posture and promote proper body mechanics. Progressed resistance, range, repetitions and complexity of movement as indicated. MANUAL THERAPY: (see below for minutes): Joint mobilization and Soft tissue mobilization was utilized and necessary because of the patient's restricted joint motion, painful spasm, loss of articular motion and restricted motion of soft tissue. MODALITIES: (see below for minutes):      to decrease pain    SELF CARE: (see below for minutes): Procedure(s) (per grid) utilized to improve and/or restore self-care/home management as related to dressing, bathing and grooming. Required minimal verbal cueing to facilitate activities of daily living skills and compensatory activities. Date: 2/4/21 2/10/21 (visit 2) 2/12/21 (visit 3) 2/15/21 (visit 4) 2/17/21 (visit 5) 2/23/21 (visit 6) 2/25/21 (visit 7)    Modalities: PACEMAKER!                                             Therapeutic Exercise: 25 min  45 min  45 min 45 min 45 min 45 min 45 min     Sit to stand 30x 30x (final 2 sets with airex in chair) 3x10 with one pad and UE support 3x10 one pad and UE support 3x10 with UE support x10 with UE support 3x10 with 1 airex and UE    Standing marching 3x10 B [id number] 4 laps with hold in // bars 4 laps with UE support       Calf raises 20x  x30  x30 and DF 3x10      nustep Next visit 5 min with intermittent rest L5 6 min with rest breaks L4 5 min with one rest break L2 5 min with one rest break 5 min L4 with one rest break 6 min L4 no rest breaks    biosway Next visit Fwd/back wt shift, lateral wt shift x3 min each         Tandem walking  2x6 laps 4 laps in // bars 4 laps in // bars  4 laps in // bars x2 laps    airex  Fwd step on/off 2x10 B Standing eyes open and eyes closed: 1 min each and head turns x10. Intermittent UE support  EC 1 min x2 on foam with one finger on bar Single leg stance 30 sec hold x 6 B with UE support repeat    Step ups    Up and over (lateral) and step up and down x15 each with UE support, 6 in Forward 2x15 and lateral up and over x15 on 6 in box with UE support  Lateral up and over to airex 3x1 min; fwd/back on airex 3x1 min     Split stance with trunk rotation     Next visit With B trunk rotation x20 with 1 kg ball and biceps curls 2x10, 1# repeat    Obstacle course     Next visit 6 hurdles, 4 in box and foam pad x3 laps each forward and lateral with CGA to supervision     Hip 3 way with band     x15 ea direction B LE, green       Lateral band walks Next visit  4 laps in // bars no resistance 4 laps black CLX band 4 laps with green band 4 laps in // bars with thick green band 3x2 laps green loop in II bars    Proprioceptive Activities:                                            Manual Therapy:                                 Therapeutic Activities:                                              HEP: see 2/4/21 flow sheet for specifics.     [facility] Portal  Treatment/Session Summary:    · Response to Treatment: continues to require use of hands with sit to stand transfer due to continued weakness through the lower extremities. · Communication/Consultation:  None today  · Equipment provided today:  None today  · Recommendations/Intent for next treatment session: Next visit will focus on progression of strength and return to prior level of function.     Total Treatment Billable Duration:  45 minutes  PT Patient Time In/Time Out  Time In: 9098  Time Out: [address], DPT    Future Appointments   Date Time Provider Myla Li   3/1/2021  3:30 PM Tacos Alaina, [place] [facility]   3/3/2021  3:30 PM [facility]   3/8/2021  3:30 PM Tacos Alaina, PTA [facility]   3/10/2021  2:45 PM Tacos Alaina, PTA [facility]   3/16/2021 11:00 AM Lucero Mays, DPT [facility]   3/18/2021 11:00 AM Lucero Mays, DPT [facility]   3/22/2021  3:30 PM Tacos Alaina, PTA [facility]   3/24/2021  3:30 PM Tacos Alaina, PTA [facility]   3/29/2021  3:30 PM Tacos Alaina, PTA [facility]   3/31/2021  3:30 PM Tacos Alaina, PTA [facility]

## 2021-03-01 ENCOUNTER — HOSPITAL ENCOUNTER (OUTPATIENT)
Dept: PHYSICAL THERAPY | Age: 81
Discharge: HOME | End: 2021-03-01
Payer: MEDICARE

## 2021-03-01 PROCEDURE — 97110 THERAPEUTIC EXERCISES: CPT

## 2021-03-01 NOTE — PROGRESS NOTES
Rehan Manriquez  : 1940  [place]  Secondary: [place] [facility] [address]  [phone]   [phone]      OUTPATIENT PHYSICAL THERAPY: Daily Treatment Note  3/1/2021    ICD-10: Treatment Diagnosis: Difficulty in walking, not elsewhere classified (R26.2) and Other abnormalities of gait and mobility (R26.89)  Effective Dates: 2021 TO 2021 (90 days). Frequency/Duration: 2 times a week for 90 Days  GOALS: (Goals have been discussed and agreed upon with patient.)  Short-Term Functional Goals: Time Frame: 2 weeks  1. Patient will be independent with HEP. 2. Patient will be able to maintain tandem stance for 5 seconds to improve ankle righting reactions for balance. 3. Patient will improve 5x sit to stand to <20 seconds to decrease risk of falling. Discharge Goals: Time Frame: 8 weeks  1. Patient will be able to perform 5x sit to  <15 seconds to decrease risk of falling. 2. Patient will be able maintain single leg stance for >5 seconds to improve safety with mobility on all surfaces. 3. Patient will be able to walk for >10 minutes to improve community mobility.    _________________________________________________________________________  Pre-treatment Symptoms/Complaints:  Pt reports no pain or fatigue prior to treatment. Pain: Initial: 0/10    md follow up 3/5/20 Post Session:  No increase/10   Medications Last Reviewed:  3/1/2021  Updated Objective Findings:  Below measures from initial evaluation unless otherwise noted. Observation/Orthostatic Postural Assessment:    No deformity or atrophy is noted. Ambulates with delayed gait speed. Palpation:    No specific palpable tenderness. 1+ edema present to the left shin.    ROM:    Ankle ROM is WNL  Knee ROM is WNL  Hip rotation is WNL  Hip extension is 0 ° B   Strength:   KNee extension: 4/5 B  Knee flexion: 4/5 B   SLR: 4/5 B  Hip abduction: 4/5 B  5x sit to stand: 25seconds  Special Tests:    Not applicable  Neurological Screen:  Grossly intact. Functional Mobility:   Ambulates with SPC in the RUE. Balance:    rhomberg with EO is normal, with EC increased postural sway. Maintains tandem stance for 3 seconds B; unable to perform single leg stance; 30second stool tap is 8x. TREATMENT:   THERAPEUTIC ACTIVITY: ( see below for minutes): Therapeutic activities per grid below to improve mobility, strength, balance and coordination. Required minimal visual, verbal, manual and tactile cues to improve independence and safety with daily activities . THERAPEUTIC EXERCISE: (see below for minutes):  Exercises per grid below to improve mobility, strength, balance and coordination. Required minimal verbal and manual cues to promote proper body alignment, promote proper body posture and promote proper body mechanics. Progressed resistance, range, repetitions and complexity of movement as indicated. MANUAL THERAPY: (see below for minutes): Joint mobilization and Soft tissue mobilization was utilized and necessary because of the patient's restricted joint motion, painful spasm, loss of articular motion and restricted motion of soft tissue. MODALITIES: (see below for minutes):      to decrease pain    SELF CARE: (see below for minutes): Procedure(s) (per grid) utilized to improve and/or restore self-care/home management as related to dressing, bathing and grooming. Required minimal verbal cueing to facilitate activities of daily living skills and compensatory activities. Date: 2/4/21 2/10/21 (visit 2) 2/12/21 (visit 3) 2/15/21 (visit 4) 2/17/21 (visit 5) 2/23/21 (visit 6) 2/25/21 (visit 7) 3/1/21 (visit 8)   Modalities: PACEMAKER!                                             Therapeutic Exercise: 25 min  45 min  45 min 45 min 45 min 45 min 45 min  45 min   Sit to stand 30x 30x (final 2 sets with airex in chair) 3x10 with one pad and UE support 3x10 one pad and UE support 3x10 with UE support x10 with UE support 3x10 with 1 airex and UE 3x10 with one pad in chair and UEs on LEs   Standing marching 3x10 B [id number] 4 laps with hold in // bars 4 laps with UE support       Calf raises 20x  x30  x30 and DF 3x10   x15   nustep Next visit 5 min with intermittent rest L5 6 min with rest breaks L4 5 min with one rest break L2 5 min with one rest break 5 min L4 with one rest break 6 min L4 no rest breaks 6 min L1   biosway Next visit Fwd/back wt shift, lateral wt shift x3 min each         Tandem walking  2x6 laps 4 laps in // bars 4 laps in // bars  4 laps in // bars x2 laps 4 laps in // bars holding 1 kg ball   airex  Fwd step on/off 2x10 B Standing eyes open and eyes closed: 1 min each and head turns x10.  Intermittent UE support  EC 1 min x2 on foam with one finger on bar Single leg stance 30 sec hold x 6 B with UE support repeat 20 sec hold x 6 B with UE support on airex (SLS)   Step ups    Up and over (lateral) and step up and down x15 each with UE support, 6 in Forward 2x15 and lateral up and over x15 on 6 in box with UE support  Lateral up and over to airex 3x1 min; fwd/back on airex 3x1 min  Forward step up x15 ea LE onto airex with one hand on bar   Split stance with trunk rotation     Next visit With B trunk rotation x20 with 1 kg ball and biceps curls 2x10, 1# repeat 2x10 ea LE with 2# biceps curls   Obstacle course     Next visit 6 hurdles, 4 in box and foam pad x3 laps each forward and lateral with CGA to supervision     Hip 3 way with band     x15 ea direction B LE, green       Lateral band walks Next visit  4 laps in // bars no resistance 4 laps black CLX band 4 laps with green band 4 laps in // bars with thick green band 3x2 laps green loop in II bars 4 laps with thick green band in // bars   Proprioceptive Activities:                                            Manual Therapy:                                 Therapeutic Activities:                                              HEP: see 2/4/21 flow sheet for specifics. [employer] Portal  Treatment/Session Summary:    · Response to Treatment: Pt did not take any rest breaks while on the bike but took one rest break after the lateral band walk. Pt is able to transfer from sitting to standing without the use of the chair arms. Continue to progress balance activities. · Communication/Consultation:  None today  · Equipment provided today:  None today  · Recommendations/Intent for next treatment session: Next visit will focus on progression of strength and return to prior level of function.     Total Treatment Billable Duration:  45 minutes  PT Patient Time In/Time Out  Time In: 0330  Time Out: Jesus Quiroga PTA    Future Appointments   Date Time Provider Myla Li   3/3/2021  3:30 PM Cori Andre [facility]   3/8/2021  3:30 PM Nikki Nesbitt PTA [facility]   3/10/2021  2:45 PM Ale[facility]ATE [facility]   3/16/2021 11:00 AM CHAYO FordeT [facility]   3/18/2021 11:00 AM Arlette Sethi DPT [facility]   3/22/2021  3:30 PM Nikki Nesbitt PTA [facility]   3/24/2021  3:30 PM Nikki Nesbitt, PTA [facility]   3/29/2021  3:30 PM Nikki Nesbitt, PTA [facility]   3/31/2021  3:30 PM Nikki Nesbitt, PTA [facility]

## 2021-03-03 ENCOUNTER — HOSPITAL ENCOUNTER (OUTPATIENT)
Dept: PHYSICAL THERAPY | Age: 81
Discharge: HOME | End: 2021-03-03
Payer: MEDICARE

## 2021-03-03 PROCEDURE — 97110 THERAPEUTIC EXERCISES: CPT

## 2021-03-03 NOTE — PROGRESS NOTES
Shane Cruz  : 1940  [place]  Secondary: [place] @ [address], [address] [place] [address]  [phone]   [phone]      OUTPATIENT PHYSICAL THERAPY: Daily Treatment Note and Progress Note 3/3/2021    ICD-10: Treatment Diagnosis: Difficulty in walking, not elsewhere classified (R26.2) and Other abnormalities of gait and mobility (R26.89)  Effective Dates: 2021 TO 2021 (90 days). Frequency/Duration: 2 times a week for 90 Days  GOALS: (Goals have been discussed and agreed upon with patient.)  Short-Term Functional Goals: Time Frame: 2 weeks  1. Patient will be independent with HEP. (MET)  2. Patient will be able to maintain tandem stance for 5 seconds to improve ankle righting reactions for balance. (MET)  3. Patient will improve 5x sit to stand to <20 seconds to decrease risk of falling. (PROGRESSING)  Discharge Goals: Time Frame: 8 weeks (ALL ONGOING)  1. Patient will be able to perform 5x sit to  <15 seconds to decrease risk of falling. 2. Patient will be able maintain single leg stance for >5 seconds to improve safety with mobility on all surfaces. 3. Patient will be able to walk for >10 minutes to improve community mobility.    _________________________________________________________________________  Pre-treatment Symptoms/Complaints:  Pt reports no pain or fatigue prior to treatment. Pain: Initial: 0/10    md follow up 3/5/20 Post Session:  No increase/10   Medications Last Reviewed:  3/3/2021  Updated Objective Findings:  Below measures from initial evaluation unless otherwise noted. Observation/Orthostatic Postural Assessment:    No deformity or atrophy is noted. Ambulates with delayed gait speed. Palpation:    No specific palpable tenderness. 1+ edema present to the left shin.    ROM:    Ankle ROM is WNL  Knee ROM is WNL  Hip rotation is WNL  Hip extension is 0 ° B   Strength:   KNee extension: 4/5 B  Knee flexion: 4/5 B   SLR: 4/5 B  Hip abduction: 4/5 B  5x sit to stand: 25seconds  Special Tests:    Not applicable  Neurological Screen:  Grossly intact. Functional Mobility:   Ambulates with SPC in the RUE. Balance:    rhomberg with EO is normal, with EC increased postural sway. Maintains tandem stance for 3 seconds B; unable to perform single leg stance; 30second stool tap is 8x. Update: 3/3/21  Five Times sit to stand: 21 seconds with UE support  Single leg stance: able to maintain 3 sec without UE support. He is able to maintain SLS for 30 sec with one hand on the bar. TREATMENT:   THERAPEUTIC ACTIVITY: ( see below for minutes): Therapeutic activities per grid below to improve mobility, strength, balance and coordination. Required minimal visual, verbal, manual and tactile cues to improve independence and safety with daily activities . THERAPEUTIC EXERCISE: (see below for minutes):  Exercises per grid below to improve mobility, strength, balance and coordination. Required minimal verbal and manual cues to promote proper body alignment, promote proper body posture and promote proper body mechanics. Progressed resistance, range, repetitions and complexity of movement as indicated. MANUAL THERAPY: (see below for minutes): Joint mobilization and Soft tissue mobilization was utilized and necessary because of the patient's restricted joint motion, painful spasm, loss of articular motion and restricted motion of soft tissue. MODALITIES: (see below for minutes):      to decrease pain    SELF CARE: (see below for minutes): Procedure(s) (per grid) utilized to improve and/or restore self-care/home management as related to dressing, bathing and grooming. Required minimal verbal cueing to facilitate activities of daily living skills and compensatory activities.      Date: 2/4/21 2/10/21 (visit 2) 2/12/21 (visit 3) 2/15/21 (visit 4) 2/17/21 (visit 5) 2/23/21 (visit 6) 2/25/21 (visit 7) 3/1/21 (visit 8) 3/3/21 (visit 9) PN   Modalities: PACEMAKER! Therapeutic Exercise: 25 min  45 min  45 min 45 min 45 min 45 min 45 min  45 min 45 min   Sit to stand 30x 30x (final 2 sets with airex in chair) 3x10 with one pad and UE support 3x10 one pad and UE support 3x10 with UE support x10 with UE support 3x10 with 1 airex and UE 3x10 with one pad in chair and UEs on LEs 5 times sit to stand, 3 times (21 sec)   Standing marching 3x10 B [id number] 4 laps with hold in // bars 4 laps with UE support     4 laps in // bars with pause   Calf raises 20x  x30  x30 and DF 3x10   x15    nustep Next visit 5 min with intermittent rest L5 6 min with rest breaks L4 5 min with one rest break L2 5 min with one rest break 5 min L4 with one rest break 6 min L4 no rest breaks 6 min L1 6 min L4   biosway Next visit Fwd/back wt shift, lateral wt shift x3 min each          Tandem walking  2x6 laps 4 laps in // bars 4 laps in // bars  4 laps in // bars x2 laps 4 laps in // bars holding 1 kg ball 30 sec hold x 6 B with one hand on bar   airex  Fwd step on/off 2x10 B Standing eyes open and eyes closed: 1 min each and head turns x10.  Intermittent UE support  EC 1 min x2 on foam with one finger on bar Single leg stance 30 sec hold x 6 B with UE support repeat 20 sec hold x 6 B with UE support on airex (SLS) 1 min hold narrow stance and 1 min hold eyes closed   Step ups    Up and over (lateral) and step up and down x15 each with UE support, 6 in Forward 2x15 and lateral up and over x15 on 6 in box with UE support  Lateral up and over to airex 3x1 min; fwd/back on airex 3x1 min  Forward step up x15 ea LE onto airex with one hand on bar    Weight shifting         On airex, reaching left to right with wide stance and then narrow stance   Split stance with trunk rotation     Next visit With B trunk rotation x20 with 1 kg ball and biceps curls 2x10, 1# repeat 2x10 ea AMINA with 2# biceps curls 2x10 with over head ball press up and 2x10 trunk rotation with yellow swiss ball   Obstacle course     Next visit 6 hurdles, 4 in box and foam pad x3 laps each forward and lateral with CGA to supervision      Hip 3 way with band     x15 ea direction B LE, green        Lateral band walks Next visit  4 laps in // bars no resistance 4 laps black CLX band 4 laps with green band 4 laps in // bars with thick green band 3x2 laps green loop in II bars 4 laps with thick green band in // bars 4 laps thick green band in // bars   Proprioceptive Activities:                                                Manual Therapy:                                    Therapeutic Activities:                                                  HEP: see 2/4/21 flow sheet for specifics. [employer] Portal  Treatment/Session Summary:    · Response to Treatment: Pt did not require any rest breaks but continues to have difficulty transferring from sitting to standing without UE support. He is making progress towards goals. He also reported difficulty with trunk rotation in the split stance. Continue POC. · Communication/Consultation:  None today  · Equipment provided today:  None today  · Recommendations/Intent for next treatment session: Next visit will focus on progression of strength and return to prior level of function.     Total Treatment Billable Duration:  45 minutes  PT Patient Time In/Time Out  Time In: 0330  Time Out: Jesus Quiroga PTA    Future Appointments   Date Time Provider Myla Li   3/8/2021  3:30 PM Cori Andre [facility]   3/10/2021  2:45 PM Alec ZARATE PTA [facility]   3/16/2021 11:00 AM TRISH FordeILIA [facility]   3/18/2021 11:00 AM Arlette Sethi DPT [facility]   3/22/2021  3:30 PM JOSE MIGUEL Andre [facility]   3/24/2021  3:30 PM JOSE MIGUEL Andre [facility]   3/29/2021  3:30 PM [facility]itt [facility]   3/31/2021  3:30 PM Tacos Foley PTA [facility]

## 2021-03-08 ENCOUNTER — HOSPITAL ENCOUNTER (OUTPATIENT)
Dept: PHYSICAL THERAPY | Age: 81
Discharge: HOME | End: 2021-03-08
Payer: MEDICARE

## 2021-03-08 PROCEDURE — 97110 THERAPEUTIC EXERCISES: CPT

## 2021-03-08 NOTE — PROGRESS NOTES
Lacie Hastings  : 1940  [place]  Secondary: [place] [address]  [phone]   [phone]      OUTPATIENT PHYSICAL THERAPY: Daily Treatment Note 3/8/2021    ICD-10: Treatment Diagnosis: Difficulty in walking, not elsewhere classified (R26.2) and Other abnormalities of gait and mobility (R26.89)  Effective Dates: 2021 TO 2021 (90 days). Frequency/Duration: 2 times a week for 90 Days  GOALS: (Goals have been discussed and agreed upon with patient.)  Short-Term Functional Goals: Time Frame: 2 weeks  1. Patient will be independent with HEP. (MET)  2. Patient will be able to maintain tandem stance for 5 seconds to improve ankle righting reactions for balance. (MET)  3. Patient will improve 5x sit to stand to <20 seconds to decrease risk of falling. (PROGRESSING)  Discharge Goals: Time Frame: 8 weeks (ALL ONGOING)  1. Patient will be able to perform 5x sit to  <15 seconds to decrease risk of falling. 2. Patient will be able maintain single leg stance for >5 seconds to improve safety with mobility on all surfaces. 3. Patient will be able to walk for >10 minutes to improve community mobility.    _________________________________________________________________________  Pre-treatment Symptoms/Complaints:  Pt reports no pain or fatigue prior to treatment. Pain: Initial: 0/10    md follow up 3/5/20 Post Session:  No increase/10   Medications Last Reviewed:  3/8/2021  Updated Objective Findings:  Below measures from initial evaluation unless otherwise noted. Observation/Orthostatic Postural Assessment:    No deformity or atrophy is noted. Ambulates with delayed gait speed. Palpation:    No specific palpable tenderness. 1+ edema present to the left shin.    ROM:    Ankle ROM is WNL  Knee ROM is WNL  Hip rotation is WNL  Hip extension is 0 ° B   Strength: KNee extension: 4/5 B  Knee flexion: 4/5 B   SLR: 4/5 B  Hip abduction: 4/5 B  5x sit to stand: 25seconds  Special Tests:    Not applicable  Neurological Screen:  Grossly intact. Functional Mobility:   Ambulates with SPC in the RUE. Balance:    rhomberg with EO is normal, with EC increased postural sway. Maintains tandem stance for 3 seconds B; unable to perform single leg stance; 30second stool tap is 8x. Update: 3/3/21  Five Times sit to stand: 21 seconds with UE support  Single leg stance: able to maintain 3 sec without UE support. He is able to maintain SLS for 30 sec with one hand on the bar. TREATMENT:   THERAPEUTIC ACTIVITY: ( see below for minutes): Therapeutic activities per grid below to improve mobility, strength, balance and coordination. Required minimal visual, verbal, manual and tactile cues to improve independence and safety with daily activities . THERAPEUTIC EXERCISE: (see below for minutes):  Exercises per grid below to improve mobility, strength, balance and coordination. Required minimal verbal and manual cues to promote proper body alignment, promote proper body posture and promote proper body mechanics. Progressed resistance, range, repetitions and complexity of movement as indicated. MANUAL THERAPY: (see below for minutes): Joint mobilization and Soft tissue mobilization was utilized and necessary because of the patient's restricted joint motion, painful spasm, loss of articular motion and restricted motion of soft tissue. MODALITIES: (see below for minutes):      to decrease pain    SELF CARE: (see below for minutes): Procedure(s) (per grid) utilized to improve and/or restore self-care/home management as related to dressing, bathing and grooming. Required minimal verbal cueing to facilitate activities of daily living skills and compensatory activities.      Date: 2/12/21 (visit 3) 2/15/21 (visit 4) 2/17/21 (visit 5) 2/23/21 (visit 6) 2/25/21 (visit 7) 3/1/21 (visit 8) 3/3/21 (visit 9) PN 3/8/21 (visit 10)   Modalities: PACEMAKER! Therapeutic Exercise: 45 min 45 min 45 min 45 min 45 min  45 min 45 min 45 min   Sit to stand 3x10 with one pad and UE support 3x10 one pad and UE support 3x10 with UE support x10 with UE support 3x10 with 1 airex and UE 3x10 with one pad in chair and UEs on LEs 5 times sit to stand, 3 times (21 sec) 3x10 with one pad and one hand on arm rest   Standing marching 4 laps with hold in // bars 4 laps with UE support     4 laps in // bars with pause 3 laps out side bars, SBA   Calf raises x30  x30 and DF 3x10   x15     nustep L5 6 min with rest breaks L4 5 min with one rest break L2 5 min with one rest break 5 min L4 with one rest break 6 min L4 no rest breaks 6 min L1 6 min L4 6 min L5   Tandem walking 4 laps in // bars 4 laps in // bars  4 laps in // bars x2 laps 4 laps in // bars holding 1 kg ball 30 sec hold x 6 B with one hand on bar    airex Standing eyes open and eyes closed: 1 min each and head turns x10.  Intermittent UE support  EC 1 min x2 on foam with one finger on bar Single leg stance 30 sec hold x 6 B with UE support repeat 20 sec hold x 6 B with UE support on airex (SLS) 1 min hold narrow stance and 1 min hold eyes closed 1 min eyes closed with intermittent use of hands   Step ups  Up and over (lateral) and step up and down x15 each with UE support, 6 in Forward 2x15 and lateral up and over x15 on 6 in box with UE support  Lateral up and over to airex 3x1 min; fwd/back on airex 3x1 min  Forward step up x15 ea LE onto airex with one hand on bar     Weight shifting       On airex, reaching left to right with wide stance and then narrow stance SLS on airex 30 sec hold x6 with UE support   Split stance with trunk rotation   Next visit With B trunk rotation x20 with 1 kg ball and biceps curls 2x10, 1# repeat 2x10 ea LE with 2# biceps curls 2x10 with over head ball press up and 2x10 trunk rotation with yellow swiss ball 2x10 ea with trunk rotation with basketball   Ball toss        x10 on floor and 2x10 on airex pad to therapist   Obstacle course   Next visit 6 hurdles, 4 in box and foam pad x3 laps each forward and lateral with CGA to supervision    Forward and lateral step over (6 hurdles) with SBA. 3 laps each   Hip 3 way with band   x15 ea direction B LE, green         Lateral band walks 4 laps in // bars no resistance 4 laps black CLX band 4 laps with green band 4 laps in // bars with thick green band 3x2 laps green loop in II bars 4 laps with thick green band in // bars 4 laps thick green band in // bars 2 laps outside bars, green   Proprioceptive Activities:                                            Manual Therapy:                                 Therapeutic Activities:                                              HEP: see 2/4/21 flow sheet for specifics. [employer] Portal  Treatment/Session Summary:    · Response to Treatment: Pt required a few brief rest breaks but he did not pause during the nustep. Pt had the most difficulty with eyes closed on the airex and cone step overs. Continue POC. · Communication/Consultation:  None today  · Equipment provided today:  None today  · Recommendations/Intent for next treatment session: Next visit will focus on progression of strength and return to prior level of function.     Total Treatment Billable Duration:  45 minutes  PT Patient Time In/Time Out  Time In: 0330  Time Out: Jesus Quiroga PTA    Future Appointments   Date Time Provider Myla Li   3/10/2021  2:45 PM Cori Desai [facility]   3/16/2021  7:00 PM Mark Najera, TRISH [facility]   3/17/2021  4:15 PM Mark Rhodess, CHAYOT [facility]   3/18/2021 11:00 AM Mark Najera DPT [facility]   3/22/2021  3:30 PM JOSE MIGUEL [facility]   3/24/2021  3:30 PM Bonifacio Landry PTA [facility]   3/29/2021  3:30 PM Jace Quigley Kimberly Yoon PTA [facility]   3/31/2021  3:30 PM Lona Mcmillan PTA [facility]

## 2021-03-10 ENCOUNTER — HOSPITAL ENCOUNTER (OUTPATIENT)
Dept: PHYSICAL THERAPY | Age: 81
Discharge: HOME | End: 2021-03-10
Payer: MEDICARE

## 2021-03-10 PROCEDURE — 97110 THERAPEUTIC EXERCISES: CPT

## 2021-03-10 NOTE — PROGRESS NOTES
Rehan Manriquez  : 1940  [place]  Secondary: [place] @ [address], [address] [place] [address]  [phone]   [phone]      OUTPATIENT PHYSICAL THERAPY: Daily Treatment Note 3/10/2021    ICD-10: Treatment Diagnosis: Difficulty in walking, not elsewhere classified (R26.2) and Other abnormalities of gait and mobility (R26.89)  Effective Dates: 2021 TO 2021 (90 days). Frequency/Duration: 2 times a week for 90 Days  GOALS: (Goals have been discussed and agreed upon with patient.)  Short-Term Functional Goals: Time Frame: 2 weeks  1. Patient will be independent with HEP. (MET)  2. Patient will be able to maintain tandem stance for 5 seconds to improve ankle righting reactions for balance. (MET)  3. Patient will improve 5x sit to stand to <20 seconds to decrease risk of falling. (PROGRESSING)  Discharge Goals: Time Frame: 8 weeks (ALL ONGOING)  1. Patient will be able to perform 5x sit to  <15 seconds to decrease risk of falling. 2. Patient will be able maintain single leg stance for >5 seconds to improve safety with mobility on all surfaces. 3. Patient will be able to walk for >10 minutes to improve community mobility.    _________________________________________________________________________  Pre-treatment Symptoms/Complaints:  Pt states \"I'm sore in the legs. \"  Pain: Initial: 0/10    md follow up 3/5/20 Post Session:  No increase/10   Medications Last Reviewed:  3/10/2021  Updated Objective Findings:  Below measures from initial evaluation unless otherwise noted. Observation/Orthostatic Postural Assessment:    No deformity or atrophy is noted. Ambulates with delayed gait speed. Palpation:    No specific palpable tenderness. 1+ edema present to the left shin.    ROM:    Ankle ROM is WNL  Knee ROM is WNL  Hip rotation is WNL  Hip extension is 0 ° B   Strength:   KNee extension: 4/5 B  Knee flexion: 4/5 B   SLR: 4/5 B  Hip abduction: 4/5 B  5x sit to stand: 25seconds  Special Tests:    Not applicable  Neurological Screen:  Grossly intact. Functional Mobility:   Ambulates with SPC in the RUE. Balance:    rhomberg with EO is normal, with EC increased postural sway. Maintains tandem stance for 3 seconds B; unable to perform single leg stance; 30second stool tap is 8x. Update: 3/3/21  Five Times sit to stand: 21 seconds with UE support  Single leg stance: able to maintain 3 sec without UE support. He is able to maintain SLS for 30 sec with one hand on the bar. TREATMENT:   THERAPEUTIC ACTIVITY: ( see below for minutes): Therapeutic activities per grid below to improve mobility, strength, balance and coordination. Required minimal visual, verbal, manual and tactile cues to improve independence and safety with daily activities . THERAPEUTIC EXERCISE: (see below for minutes):  Exercises per grid below to improve mobility, strength, balance and coordination. Required minimal verbal and manual cues to promote proper body alignment, promote proper body posture and promote proper body mechanics. Progressed resistance, range, repetitions and complexity of movement as indicated. MANUAL THERAPY: (see below for minutes): Joint mobilization and Soft tissue mobilization was utilized and necessary because of the patient's restricted joint motion, painful spasm, loss of articular motion and restricted motion of soft tissue. MODALITIES: (see below for minutes):      to decrease pain    SELF CARE: (see below for minutes): Procedure(s) (per grid) utilized to improve and/or restore self-care/home management as related to dressing, bathing and grooming. Required minimal verbal cueing to facilitate activities of daily living skills and compensatory activities.      Date: 2/12/21 (visit 3) 2/15/21 (visit 4) 2/17/21 (visit 5) 2/23/21 (visit 6) 2/25/21 (visit 7) 3/1/21 (visit 8) 3/3/21 (visit 9) PN 3/8/21 (visit 10) 3/10/21 (visit 11)   Modalities: PACEMAKER! Therapeutic Exercise: 45 min 45 min 45 min 45 min 45 min  45 min 45 min 45 min 45 min   Sit to stand 3x10 with one pad and UE support 3x10 one pad and UE support 3x10 with UE support x10 with UE support 3x10 with 1 airex and UE 3x10 with one pad in chair and UEs on LEs 5 times sit to stand, 3 times (21 sec) 3x10 with one pad and one hand on arm rest 3x10 with one pad and one hand   Standing marching 4 laps with hold in // bars 4 laps with UE support     4 laps in // bars with pause 3 laps out side bars, SBA 2 laps outside bars with SBA   Calf raises x30  x30 and DF 3x10   x15      nustep L5 6 min with rest breaks L4 5 min with one rest break L2 5 min with one rest break 5 min L4 with one rest break 6 min L4 no rest breaks 6 min L1 6 min L4 6 min L5 6 min L5   Tandem walking 4 laps in // bars 4 laps in // bars  4 laps in // bars x2 laps 4 laps in // bars holding 1 kg ball 30 sec hold x 6 B with one hand on bar  30 sec hold x 6 B with one finger on the bar   airex Standing eyes open and eyes closed: 1 min each and head turns x10.  Intermittent UE support  EC 1 min x2 on foam with one finger on bar Single leg stance 30 sec hold x 6 B with UE support repeat 20 sec hold x 6 B with UE support on airex (SLS) 1 min hold narrow stance and 1 min hold eyes closed 1 min eyes closed with intermittent use of hands    Step ups  Up and over (lateral) and step up and down x15 each with UE support, 6 in Forward 2x15 and lateral up and over x15 on 6 in box with UE support  Lateral up and over to airex 3x1 min; fwd/back on airex 3x1 min  Forward step up x15 ea LE onto airex with one hand on bar      Weight shifting       On airex, reaching left to right with wide stance and then narrow stance SLS on airex 30 sec hold x6 with UE support SLS 30 sec hold x6 with one finger on bar   Split stance with trunk rotation   Next visit With B trunk rotation x20 with 1 kg ball and biceps curls 2x10, 1# repeat 2x10 ea LE with 2# biceps curls 2x10 with over head ball press up and 2x10 trunk rotation with yellow swiss ball 2x10 ea with trunk rotation with basketball 2x10 trunk rotation with basketball   Ball toss        x10 on floor and 2x10 on airex pad to therapist x30 to therapist outside bars   Obstacle course   Next visit 6 hurdles, 4 in box and foam pad x3 laps each forward and lateral with CGA to supervision    Forward and lateral step over (6 hurdles) with SBA. 3 laps each 3 laps forward step overs   Hip 3 way with band   x15 ea direction B LE, green          Lateral band walks 4 laps in // bars no resistance 4 laps black CLX band 4 laps with green band 4 laps in // bars with thick green band 3x2 laps green loop in II bars 4 laps with thick green band in // bars 4 laps thick green band in // bars 2 laps outside bars, green    Proprioceptive Activities:                                                Manual Therapy:                                    Therapeutic Activities:                                                  HEP: see 2/4/21 flow sheet for specifics. [employer] Portal  Treatment/Session Summary:    · Response to Treatment: Pt only took one rest break today. Pt has difficulty with hip flexion outside the bars due to lack of balance. Continue POC. · Communication/Consultation:  None today  · Equipment provided today:  None today  · Recommendations/Intent for next treatment session: Next visit will focus on progression of strength and return to prior level of function.     Total Treatment Billable Duration:  45 minutes  PT Patient Time In/Time Out  Time In: 0166  Time Out: [address], PTA    Future Appointments   Date Time Provider Myla Li   3/16/2021  7:00 PM Mark Najera DPT [facility]   3/17/2021  4:15 PM Mark Najera DPT [facility]   3/18/2021 11:00 AM Joe Mackay Robin Pena, DPT [facility]   3/22/2021  3:30 PM Dearl Ramila, PTA [facility]   3/24/2021  3:30 PM Dearl Ramila, PTA [facility]   3/29/2021  3:30 PM Dearl Ramila, PTA [facility]   3/31/2021  3:30 PM Dearl Ramila, PTA [facility]

## 2021-03-16 ENCOUNTER — APPOINTMENT (OUTPATIENT)
Dept: PHYSICAL THERAPY | Age: 81
End: 2021-03-16
Payer: MEDICARE

## 2021-03-17 ENCOUNTER — APPOINTMENT (OUTPATIENT)
Dept: PHYSICAL THERAPY | Age: 81
End: 2021-03-17
Payer: MEDICARE

## 2021-03-18 ENCOUNTER — APPOINTMENT (OUTPATIENT)
Dept: PHYSICAL THERAPY | Age: 81
End: 2021-03-18
Payer: MEDICARE

## 2021-03-22 ENCOUNTER — APPOINTMENT (OUTPATIENT)
Dept: PHYSICAL THERAPY | Age: 81
End: 2021-03-22
Payer: MEDICARE

## 2021-03-24 ENCOUNTER — APPOINTMENT (OUTPATIENT)
Dept: PHYSICAL THERAPY | Age: 81
End: 2021-03-24
Payer: MEDICARE

## 2021-03-29 ENCOUNTER — HOSPITAL ENCOUNTER (OUTPATIENT)
Dept: PHYSICAL THERAPY | Age: 81
Discharge: HOME | End: 2021-03-29
Payer: MEDICARE

## 2021-03-31 ENCOUNTER — HOSPITAL ENCOUNTER (OUTPATIENT)
Dept: PHYSICAL THERAPY | Age: 81
Discharge: HOME | End: 2021-03-31
Payer: MEDICARE

## 2021-04-07 ENCOUNTER — APPOINTMENT (OUTPATIENT)
Dept: PHYSICAL THERAPY | Age: 81
End: 2021-04-07
Payer: MEDICARE

## 2021-04-12 ENCOUNTER — APPOINTMENT (OUTPATIENT)
Dept: PHYSICAL THERAPY | Age: 81
End: 2021-04-12
Payer: MEDICARE

## 2021-04-14 ENCOUNTER — APPOINTMENT (OUTPATIENT)
Dept: PHYSICAL THERAPY | Age: 81
End: 2021-04-14
Payer: MEDICARE

## 2021-04-19 ENCOUNTER — APPOINTMENT (OUTPATIENT)
Dept: PHYSICAL THERAPY | Age: 81
End: 2021-04-19
Payer: MEDICARE

## 2021-04-21 ENCOUNTER — HOSPITAL ENCOUNTER (OUTPATIENT)
Dept: PHYSICAL THERAPY | Age: 81
End: 2021-04-21
Payer: MEDICARE

## 2021-04-26 ENCOUNTER — APPOINTMENT (OUTPATIENT)
Dept: PHYSICAL THERAPY | Age: 81
End: 2021-04-26
Payer: MEDICARE

## 2021-04-28 ENCOUNTER — HOSPITAL ENCOUNTER (OUTPATIENT)
Dept: PHYSICAL THERAPY | Age: 81
End: 2021-04-28
Payer: MEDICARE

## 2021-04-29 ENCOUNTER — HOSPITAL ENCOUNTER (OUTPATIENT)
Dept: PHYSICAL THERAPY | Age: 81
Discharge: HOME | End: 2021-04-29
Payer: MEDICARE

## 2021-04-29 PROCEDURE — 97110 THERAPEUTIC EXERCISES: CPT

## 2021-04-29 PROCEDURE — 97164 PT RE-EVAL EST PLAN CARE: CPT

## 2021-04-29 NOTE — PROGRESS NOTES
Martin Gonzales  : 1940  [place]  Secondary: [place] @ [address], [address] [place] [address]  [phone]   [phone]      OUTPATIENT PHYSICAL THERAPY: Daily Treatment Note and Recertification  2594    ICD-10: Treatment Diagnosis: Difficulty in walking, not elsewhere classified (R26.2) and Other abnormalities of gait and mobility (R26.89)  Effective Dates: 21 to 21  (90 days). Frequency/Duration: 2 times a week for 90 Days  GOALS: (Goals have been discussed and agreed upon with patient.)  Short-Term Functional Goals: Time Frame: 2 weeks ongoing  1. Patient will be independent with HEP. (MET)  2. Patient will be able to maintain tandem stance for 5 seconds to improve ankle righting reactions for balance. (not met  3. Patient will improve 5x sit to stand to <20 seconds to decrease risk of falling. (not met)  Discharge Goals: Time Frame: 8 weeks (ALL ONGOING)  1. Patient will be able to perform 5x sit to  <15 seconds to decrease risk of falling. 2. Patient will be able maintain single leg stance for >5 seconds to improve safety with mobility on all surfaces. 3. Patient will be able to walk for >10 minutes to improve community mobility. Regarding [facility] therapy, I certify that the treatment plan above will be carried out by a therapist or under their direction. Thank you for this referral,  Chidi Ramos DPT     Referring Physician Signature: Maryana Crowe, * _______________________________ Date _____________      _________________________________________________________________________  Pre-treatment Symptoms/Complaints:  Pt returns to therapy following heart attack. He has been receiving home health therapy which he reports has gone well. His primary complaint is related to balance and general feeling of instability with gait and standing tasks. He notes generally feeling pretty good, with some fatigue. Pain: Initial: 0/10   Post Session:  No increase/10   Medications Last Reviewed:  4/29/2021  Updated Objective Findings:  Below measures from initial evaluation unless otherwise noted. Observation/Orthostatic Postural Assessment:    No deformity or atrophy is noted. Ambulates with delayed gait speed. Palpation:    No specific palpable tenderness. 1+ edema present to the left shin. ROM:    Ankle ROM is WNL  Knee ROM is WNL  Hip rotation is WNL  Hip extension is 0 ° B   Strength:   KNee extension: 4/5 B  Knee flexion: 4/5 B   SLR: 4/5 B  Hip abduction: 4/5 B  5x sit to stand: 25seconds  Special Tests:    Not applicable  Neurological Screen:  Grossly intact. Functional Mobility:   Ambulates with SPC in the RUE. Balance:    rhomberg with EO is normal, with EC increased postural sway. Maintains tandem stance for 3 seconds B; unable to perform single leg stance; 30second stool tap is 8x. Update: 3/3/21  Five Times sit to stand: 21 seconds with UE support  Single leg stance: able to maintain 3 sec without UE support. He is able to maintain SLS for 30 sec with one hand on the bar.    4/29/21 update:   SpO2: 99% in sitting   HR 84 in sitting  BP: 118/70  rhomberg stance with EO and EC is normal  Unable to assume tandem stance or single leg stance  5x sit to stand 19seconds (HR climbs to 107 after)      TREATMENT:   THERAPEUTIC ACTIVITY: ( see below for minutes): Therapeutic activities per grid below to improve mobility, strength, balance and coordination. Required minimal visual, verbal, manual and tactile cues to improve independence and safety with daily activities . THERAPEUTIC EXERCISE: (see below for minutes):  Exercises per grid below to improve mobility, strength, balance and coordination. Required minimal verbal and manual cues to promote proper body alignment, promote proper body posture and promote proper body mechanics.   Progressed resistance, range, repetitions and complexity of movement as indicated. MANUAL THERAPY: (see below for minutes): Joint mobilization and Soft tissue mobilization was utilized and necessary because of the patient's restricted joint motion, painful spasm, loss of articular motion and restricted motion of soft tissue. MODALITIES: (see below for minutes):      to decrease pain    SELF CARE: (see below for minutes): Procedure(s) (per grid) utilized to improve and/or restore self-care/home management as related to dressing, bathing and grooming. Required minimal verbal cueing to facilitate activities of daily living skills and compensatory activities. Date: 3/1/21 (visit 8) 3/3/21 (visit 9) PN 3/8/21 (visit 10) 3/10/21 (visit 11) 4/29/21 (visit 12)   Modalities: PACEMAKER!                                 Therapeutic Exercise: 45 min 45 min 45 min 45 min 30 min    Sit to stand 3x10 with one pad in chair and UEs on LEs 5 times sit to stand, 3 times (21 sec) 3x10 with one pad and one hand on arm rest 3x10 with one pad and one hand    Standing marching  4 laps in // bars with pause 3 laps out side bars, SBA 2 laps outside bars with SBA    Calf raises x15       nustep 6 min L1 6 min L4 6 min L5 6 min L5    Tandem walking 4 laps in // bars holding 1 kg ball 30 sec hold x 6 B with one hand on bar  30 sec hold x 6 B with one finger on the bar x8 laps in II bars   airex 20 sec hold x 6 B with UE support on airex (SLS) 1 min hold narrow stance and 1 min hold eyes closed 1 min eyes closed with intermittent use of hands  Romberg stance 2x1 min EO, 3x1 min EC   Step ups Forward step up x15 ea LE onto airex with one hand on bar       Weight shifting  On airex, reaching left to right with wide stance and then narrow stance SLS on airex 30 sec hold x6 with UE support SLS 30 sec hold x6 with one finger on bar biosway lateral wt shift x3 min; fwd/back wt shift 3 min; postural stability x3 min    Split stance with trunk rotation 2x10 ea LE with 2# biceps curls 2x10 with over head ball press up and 2x10 trunk rotation with yellow swiss ball 2x10 ea with trunk rotation with basketball 2x10 trunk rotation with basketball    Ball toss   x10 on floor and 2x10 on airex pad to therapist x30 to therapist outside bars    Obstacle course   Forward and lateral step over (6 hurdles) with SBA. 3 laps each 3 laps forward step overs    Hip 3 way with band        Lateral band walks 4 laps with thick green band in // bars 4 laps thick green band in // bars 2 laps outside bars, green     Proprioceptive Activities:                                Manual Therapy:                        Therapeutic Activities:                                  HEP: see 2/4/21 flow sheet for specifics. [facility] Portal  Treatment/Session Summary:    · Response to Treatment: pt has decreased exercise tolerance due to recent MI. He requires more frequent rest breaks due to fatigue. He has increased difficulty with tandem stance and proprioceptive tasks such as eyes closed or unstable surface tasks. · Communication/Consultation:  None today  · Equipment provided today:  None today  · Recommendations/Intent for next treatment session: Next visit will focus on progression of strength and return to prior level of function.     Total Treatment Billable Duration:  45 minutes  PT Patient Time In/Time Out  Time In: 0660  Time Out: 1400 [facility]    Future Appointments   Date Time Provider Myla Li   5/3/2021 10:15 AM [place] Spine, PTA [facility]   5/6/2021  2:45 PM Rosanna Spine, PTA [facility]   5/10/2021  3:30 PM Rosanna Spine, PTA [facility]   5/14/2021  3:30 PM Rosanna Spine, PTA [facility]   5/18/2021  4:15 PM Chester Toledoson, PTA [facility]   5/20/2021  4:15 PM Chester Levi, PTA [facility]   5/25/2021  4:15 PM [facility], PTA [facility]   5/28/2021  2:00 PM Richard Levi, Keke Shelton, PTA [facility]

## 2021-04-30 ENCOUNTER — APPOINTMENT (OUTPATIENT)
Dept: PHYSICAL THERAPY | Age: 81
End: 2021-04-30
Payer: MEDICARE

## 2021-05-03 ENCOUNTER — HOSPITAL ENCOUNTER (OUTPATIENT)
Dept: PHYSICAL THERAPY | Age: 81
Discharge: HOME | End: 2021-05-03
Payer: MEDICARE

## 2021-05-03 PROCEDURE — 97110 THERAPEUTIC EXERCISES: CPT

## 2021-05-03 NOTE — PROGRESS NOTES
Yevgeniy Camp  : 1940  [place]  Secondary: [place] [facility] [address]  [phone]   [phone]      OUTPATIENT PHYSICAL THERAPY: Daily Treatment Note  5/3/2021    ICD-10: Treatment Diagnosis: Difficulty in walking, not elsewhere classified (R26.2) and Other abnormalities of gait and mobility (R26.89)  Effective Dates: 21 to 21  (90 days). Frequency/Duration: 2 times a week for 90 Days  GOALS: (Goals have been discussed and agreed upon with patient.)  Short-Term Functional Goals: Time Frame: 2 weeks ongoing  1. Patient will be independent with HEP. (MET)  2. Patient will be able to maintain tandem stance for 5 seconds to improve ankle righting reactions for balance. (not met  3. Patient will improve 5x sit to stand to <20 seconds to decrease risk of falling. (not met)  Discharge Goals: Time Frame: 8 weeks (ALL ONGOING)  1. Patient will be able to perform 5x sit to  <15 seconds to decrease risk of falling. 2. Patient will be able maintain single leg stance for >5 seconds to improve safety with mobility on all surfaces. 3. Patient will be able to walk for >10 minutes to improve community mobility.      _________________________________________________________________________  Pre-treatment Symptoms/Complaints:  Pt reported no pain or fatigue prior to treatment. Pain: Initial: 0/10   Post Session:  No increase/10   Medications Last Reviewed:  5/3/2021  Updated Objective Findings:  Below measures from initial evaluation unless otherwise noted. Observation/Orthostatic Postural Assessment:    No deformity or atrophy is noted. Ambulates with delayed gait speed. Palpation:    No specific palpable tenderness. 1+ edema present to the left shin.    ROM:    Ankle ROM is WNL  Knee ROM is WNL  Hip rotation is WNL  Hip extension is 0 ° B   Strength:   KNee extension: 4/5 B  Knee flexion: 4/5 B   SLR: 4/5 B  Hip abduction: 4/5 B  5x sit to stand: 25seconds  Special Tests:    Not applicable  Neurological Screen:  Grossly intact. Functional Mobility:   Ambulates with SPC in the RUE. Balance:    rhomberg with EO is normal, with EC increased postural sway. Maintains tandem stance for 3 seconds B; unable to perform single leg stance; 30second stool tap is 8x. Update: 3/3/21  Five Times sit to stand: 21 seconds with UE support  Single leg stance: able to maintain 3 sec without UE support. He is able to maintain SLS for 30 sec with one hand on the bar.    4/29/21 update:   SpO2: 99% in sitting   HR 84 in sitting  BP: 118/70  rhomberg stance with EO and EC is normal  Unable to assume tandem stance or single leg stance  5x sit to stand 19seconds (HR climbs to 107 after)     5/3/21   HR: 90 bpm in sitting  BP: 136/79 R arm  Unable to get an oxygen saturation reading (possibly due to cold hands)     TREATMENT:   THERAPEUTIC ACTIVITY: ( see below for minutes): Therapeutic activities per grid below to improve mobility, strength, balance and coordination. Required minimal visual, verbal, manual and tactile cues to improve independence and safety with daily activities . THERAPEUTIC EXERCISE: (see below for minutes):  Exercises per grid below to improve mobility, strength, balance and coordination. Required minimal verbal and manual cues to promote proper body alignment, promote proper body posture and promote proper body mechanics. Progressed resistance, range, repetitions and complexity of movement as indicated. MANUAL THERAPY: (see below for minutes): Joint mobilization and Soft tissue mobilization was utilized and necessary because of the patient's restricted joint motion, painful spasm, loss of articular motion and restricted motion of soft tissue.    MODALITIES: (see below for minutes):      to decrease pain    SELF CARE: (see below for minutes): Procedure(s) (per grid) utilized to improve and/or restore self-care/home management as related to dressing, bathing and grooming. Required minimal verbal cueing to facilitate activities of daily living skills and compensatory activities. Date: 3/1/21 (visit 8) 3/3/21 (visit 9) PN 3/8/21 (visit 10) 3/10/21 (visit 11) 4/29/21 (visit 12) 5/3/21 (visit 13)   Modalities: PACEMAKER!                                     Therapeutic Exercise: 45 min 45 min 45 min 45 min 30 min  45 min   Sit to stand 3x10 with one pad in chair and UEs on LEs 5 times sit to stand, 3 times (21 sec) 3x10 with one pad and one hand on arm rest 3x10 with one pad and one hand  4x5 with one pad in chair and one hand for support   Long arc quad      2x10 B with 5#   Standing marching  4 laps in // bars with pause 3 laps out side bars, SBA 2 laps outside bars with SBA  4 laps in // bars   Calf raises x15        nustep 6 min L1 6 min L4 6 min L5 6 min L5     Tandem walking 4 laps in // bars holding 1 kg ball 30 sec hold x 6 B with one hand on bar  30 sec hold x 6 B with one finger on the bar x8 laps in II bars 4 laps in // bars   airex 20 sec hold x 6 B with UE support on airex (SLS) 1 min hold narrow stance and 1 min hold eyes closed 1 min eyes closed with intermittent use of hands  Romberg stance 2x1 min EO, 3x1 min EC    Step ups Forward step up x15 ea LE onto airex with one hand on bar        Weight shifting  On airex, reaching left to right with wide stance and then narrow stance SLS on airex 30 sec hold x6 with UE support SLS 30 sec hold x6 with one finger on bar biosway lateral wt shift x3 min; fwd/back wt shift 3 min; postural stability x3 min     Split stance with trunk rotation 2x10 ea LE with 2# biceps curls 2x10 with over head ball press up and 2x10 trunk rotation with yellow swiss ball 2x10 ea with trunk rotation with basketball 2x10 trunk rotation with basketball     Ball toss   x10 on floor and 2x10 on airex pad to therapist x30 to therapist outside bars     Obstacle course   Forward and lateral step over (6 hurdles) with SBA. 3 laps each 3 laps forward step overs  3 hurdles in // bars: 4 laps each lateral and forward   Hip 3 way with band         Lateral band walks 4 laps with thick green band in // bars 4 laps thick green band in // bars 2 laps outside bars, green   4 laps (no band) in // bars   Proprioceptive Activities:                                    Manual Therapy:                           Therapeutic Activities:                                      HEP: see 2/4/21 flow sheet for specifics. [facility] Portal  Treatment/Session Summary:    · Response to Treatment: Pt had mild difficulty clearing the hurdles and required the use of his UEs for balance and support. Continue to progress balance and strengthening. · Communication/Consultation:  None today  · Equipment provided today:  None today  · Recommendations/Intent for next treatment session: Next visit will focus on progression of strength and return to prior level of function.     Total Treatment Billable Duration:  45 minutes  PT Patient Time In/Time Out  Time In: 1353  Time Out: 6308 Lucian Londono PTA    Future Appointments   Date Time Provider Myla Li   5/6/2021  2:45 PM Cori Wilson [facility]   5/10/2021  3:30 PM Arthesa Nevarez PTA [facility]   5/14/2021  3:30 PM Arthesa Nevarez PTA [facility]   5/18/2021  4:15 PM Mylinda Lente, PTA [facility]   5/20/2021  4:15 PM Mylinda Lente, PTA [facility]   5/25/2021  4:15 PM [facility]   5/28/2021  2:00 PM Arthesa Nevarez, [facility]

## 2021-05-06 ENCOUNTER — HOSPITAL ENCOUNTER (OUTPATIENT)
Dept: PHYSICAL THERAPY | Age: 81
Discharge: HOME | End: 2021-05-06
Payer: MEDICARE

## 2021-05-06 PROCEDURE — 97110 THERAPEUTIC EXERCISES: CPT

## 2021-05-06 NOTE — PROGRESS NOTES
Aviva Chirinos  : 1940  [place]  Secondary: [place] [facility] [address]  [phone]   [phone]      OUTPATIENT PHYSICAL THERAPY: Daily Treatment Note  2021    ICD-10: Treatment Diagnosis: Difficulty in walking, not elsewhere classified (R26.2) and Other abnormalities of gait and mobility (R26.89)  Effective Dates: 21 to 21  (90 days). Frequency/Duration: 2 times a week for 90 Days  GOALS: (Goals have been discussed and agreed upon with patient.)  Short-Term Functional Goals: Time Frame: 2 weeks ongoing  1. Patient will be independent with HEP. (MET)  2. Patient will be able to maintain tandem stance for 5 seconds to improve ankle righting reactions for balance. (not met  3. Patient will improve 5x sit to stand to <20 seconds to decrease risk of falling. (not met)  Discharge Goals: Time Frame: 8 weeks (ALL ONGOING)  1. Patient will be able to perform 5x sit to  <15 seconds to decrease risk of falling. 2. Patient will be able maintain single leg stance for >5 seconds to improve safety with mobility on all surfaces. 3. Patient will be able to walk for >10 minutes to improve community mobility.      _________________________________________________________________________  Pre-treatment Symptoms/Complaints:  Pt reported no pain or fatigue prior to treatment. Pain: Initial: 0/10   Post Session:  No increase/10   Medications Last Reviewed:  2021  Updated Objective Findings:  Below measures from initial evaluation unless otherwise noted. Observation/Orthostatic Postural Assessment:    No deformity or atrophy is noted. Ambulates with delayed gait speed. Palpation:    No specific palpable tenderness. 1+ edema present to the left shin.    ROM:    Ankle ROM is WNL  Knee ROM is WNL  Hip rotation is WNL  Hip extension is 0 ° B   Strength:   KNee extension: 4/5 B  Knee flexion: 4/5 B   SLR: 4/5 B  Hip abduction: 4/5 B  5x sit to stand: 25seconds  Special Tests:    Not applicable  Neurological Screen:  Grossly intact. Functional Mobility:   Ambulates with SPC in the RUE. Balance:    rhomberg with EO is normal, with EC increased postural sway. Maintains tandem stance for 3 seconds B; unable to perform single leg stance; 30second stool tap is 8x. Update: 3/3/21  Five Times sit to stand: 21 seconds with UE support  Single leg stance: able to maintain 3 sec without UE support. He is able to maintain SLS for 30 sec with one hand on the bar.    4/29/21 update:   SpO2: 99% in sitting   HR 84 in sitting  BP: 118/70  rhomberg stance with EO and EC is normal  Unable to assume tandem stance or single leg stance  5x sit to stand 19seconds (HR climbs to 107 after)     5/3/21   HR: 90 bpm in sitting  BP: 136/79 R arm  Unable to get an oxygen saturation reading (possibly due to cold hands)    5/6/21  HR: 72 bpm in sitting     TREATMENT:   THERAPEUTIC ACTIVITY: ( see below for minutes): Therapeutic activities per grid below to improve mobility, strength, balance and coordination. Required minimal visual, verbal, manual and tactile cues to improve independence and safety with daily activities . THERAPEUTIC EXERCISE: (see below for minutes):  Exercises per grid below to improve mobility, strength, balance and coordination. Required minimal verbal and manual cues to promote proper body alignment, promote proper body posture and promote proper body mechanics. Progressed resistance, range, repetitions and complexity of movement as indicated. MANUAL THERAPY: (see below for minutes): Joint mobilization and Soft tissue mobilization was utilized and necessary because of the patient's restricted joint motion, painful spasm, loss of articular motion and restricted motion of soft tissue.    MODALITIES: (see below for minutes):      to decrease pain    SELF CARE: (see below for minutes): Procedure(s) (per grid) utilized to improve and/or restore self-care/home management as related to dressing, bathing and grooming. Required minimal verbal cueing to facilitate activities of daily living skills and compensatory activities. Date: 3/1/21 (visit 8) 3/3/21 (visit 9) PN 3/8/21 (visit 10) 3/10/21 (visit 11) 4/29/21 (visit 12) 5/3/21 (visit 13) 5/6/21 (visit 14)   Modalities: PACEMAKER!                                         Therapeutic Exercise: 45 min 45 min 45 min 45 min 30 min  45 min 45 min   Sit to stand 3x10 with one pad in chair and UEs on LEs 5 times sit to stand, 3 times (21 sec) 3x10 with one pad and one hand on arm rest 3x10 with one pad and one hand  4x5 with one pad in chair and one hand for support 2x10 no pain and intermittent UE support   Long arc quad      2x10 B with 5#    Standing marching  4 laps in // bars with pause 3 laps out side bars, SBA 2 laps outside bars with SBA  4 laps in // bars    Calf raises x15      x20   Tandem walking 4 laps in // bars holding 1 kg ball 30 sec hold x 6 B with one hand on bar  30 sec hold x 6 B with one finger on the bar x8 laps in II bars 4 laps in // bars 4 laps in //bars   SLS       30 sec hold x4 B with intermittent use of UE   airex 20 sec hold x 6 B with UE support on airex (SLS) 1 min hold narrow stance and 1 min hold eyes closed 1 min eyes closed with intermittent use of hands  Romberg stance 2x1 min EO, 3x1 min EC  rhomberg 30 sec hold x4    Step ups Forward step up x15 ea LE onto airex with one hand on bar         Weight shifting  On airex, reaching left to right with wide stance and then narrow stance SLS on airex 30 sec hold x6 with UE support SLS 30 sec hold x6 with one finger on bar biosway lateral wt shift x3 min; fwd/back wt shift 3 min; postural stability x3 min      Split stance with trunk rotation 2x10 ea LE with 2# biceps curls 2x10 with over head ball press up and 2x10 trunk rotation with yellow swiss ball 2x10 ea with trunk rotation with basketball 2x10 trunk rotation with basketball      Ball toss   x10 on floor and 2x10 on airex pad to therapist x30 to therapist outside bars      Obstacle course   Forward and lateral step over (6 hurdles) with SBA. 3 laps each 3 laps forward step overs  3 hurdles in // bars: 4 laps each lateral and forward 1 jr, foam pad and 4 in step. 4 laps each fowrad and lateral with CGA   Hip 3 way with band          Lateral band walks 4 laps with thick green band in // bars 4 laps thick green band in // bars 2 laps outside bars, green   4 laps (no band) in // bars 4 L thick green band in // bars   Proprioceptive Activities:                                        Manual Therapy:                              Therapeutic Activities:                                          HEP: see 2/4/21 flow sheet for specifics. [facility] Portal  Treatment/Session Summary:    · Response to Treatment: Pt had mild difficulty clearing the supporting foot during the jr portion of the obstacle course. Continue POC. · Communication/Consultation:  None today  · Equipment provided today:  None today  · Recommendations/Intent for next treatment session: Next visit will focus on progression of strength and return to prior level of function.     Total Treatment Billable Duration:  45 minutes  PT Patient Time In/Time Out  Time In: 7003  Time Out: 6248 Lucian Londono PTA    Future Appointments   Date Time Provider Myla Li   5/10/2021  3:30 PM Cori Galvez [facility]   5/14/2021  3:30 PM Marsha Brunson PTA [facility]   5/18/2021  4:15 PM JOSE MIGUEL Avilez [facility]   5/20/2021  4:15 PM JOSE MIGUEL Avilez [facility]   5/25/2021  4:15 PM JOSE MIGUEL Galvez [facility]   5/28/2021  2:00 PM JOSE MIGUEL Galvez [facility]

## 2021-05-10 ENCOUNTER — HOSPITAL ENCOUNTER (OUTPATIENT)
Dept: PHYSICAL THERAPY | Age: 81
Discharge: HOME | End: 2021-05-10
Payer: MEDICARE

## 2021-05-10 PROCEDURE — 97110 THERAPEUTIC EXERCISES: CPT

## 2021-05-10 NOTE — PROGRESS NOTES
Ollie Elizabeth  : 1940  [place]  Secondary: [place] [address]  [phone]   [phone]      OUTPATIENT PHYSICAL THERAPY: Daily Treatment Note  5/10/2021    ICD-10: Treatment Diagnosis: Difficulty in walking, not elsewhere classified (R26.2) and Other abnormalities of gait and mobility (R26.89)  Effective Dates: 21 to 21  (90 days). Frequency/Duration: 2 times a week for 90 Days  GOALS: (Goals have been discussed and agreed upon with patient.)  Short-Term Functional Goals: Time Frame: 2 weeks ongoing  1. Patient will be independent with HEP. (MET)  2. Patient will be able to maintain tandem stance for 5 seconds to improve ankle righting reactions for balance. (not met  3. Patient will improve 5x sit to stand to <20 seconds to decrease risk of falling. (not met)  Discharge Goals: Time Frame: 8 weeks (ALL ONGOING)  1. Patient will be able to perform 5x sit to  <15 seconds to decrease risk of falling. 2. Patient will be able maintain single leg stance for >5 seconds to improve safety with mobility on all surfaces. 3. Patient will be able to walk for >10 minutes to improve community mobility.      _________________________________________________________________________  Pre-treatment Symptoms/Complaints:  Pt reported no pain prior to treatment. Pain: Initial: 0/10   Post Session:  No increase/10   Medications Last Reviewed:  5/10/2021  Updated Objective Findings:  Below measures from initial evaluation unless otherwise noted. Observation/Orthostatic Postural Assessment:    No deformity or atrophy is noted. Ambulates with delayed gait speed. Palpation:    No specific palpable tenderness. 1+ edema present to the left shin.    ROM:    Ankle ROM is WNL  Knee ROM is WNL  Hip rotation is WNL  Hip extension is 0 ° B   Strength:   KNee extension: 4/5 B  Knee flexion: 4/5 B   SLR: 4/5 B  Hip abduction: 4/5 B  5x sit to stand: 25seconds  Special Tests:    Not applicable  Neurological Screen:  Grossly intact. Functional Mobility:   Ambulates with SPC in the RUE. Balance:    rhomberg with EO is normal, with EC increased postural sway. Maintains tandem stance for 3 seconds B; unable to perform single leg stance; 30second stool tap is 8x. Update: 3/3/21  Five Times sit to stand: 21 seconds with UE support  Single leg stance: able to maintain 3 sec without UE support. He is able to maintain SLS for 30 sec with one hand on the bar.    4/29/21 update:   SpO2: 99% in sitting   HR 84 in sitting  BP: 118/70  rhomberg stance with EO and EC is normal  Unable to assume tandem stance or single leg stance  5x sit to stand 19seconds (HR climbs to 107 after)     5/3/21   HR: 90 bpm in sitting  BP: 136/79 R arm  Unable to get an oxygen saturation reading (possibly due to cold hands)    5/6/21  HR: 72 bpm in sitting    5/10/21  HR: 82 bpm after recumbent bike in sitting     TREATMENT:   THERAPEUTIC ACTIVITY: ( see below for minutes): Therapeutic activities per grid below to improve mobility, strength, balance and coordination. Required minimal visual, verbal, manual and tactile cues to improve independence and safety with daily activities . THERAPEUTIC EXERCISE: (see below for minutes):  Exercises per grid below to improve mobility, strength, balance and coordination. Required minimal verbal and manual cues to promote proper body alignment, promote proper body posture and promote proper body mechanics. Progressed resistance, range, repetitions and complexity of movement as indicated. MANUAL THERAPY: (see below for minutes): Joint mobilization and Soft tissue mobilization was utilized and necessary because of the patient's restricted joint motion, painful spasm, loss of articular motion and restricted motion of soft tissue.    MODALITIES: (see below for minutes): to decrease pain    SELF CARE: (see below for minutes): Procedure(s) (per grid) utilized to improve and/or restore self-care/home management as related to dressing, bathing and grooming. Required minimal verbal cueing to facilitate activities of daily living skills and compensatory activities. Date: 3/8/21 (visit 10) 3/10/21 (visit 11) 1/91/48 (visit 12) recert 6/6/43 (visit 13) 5/6/21 (visit 14) 5/10/21 (visit 15)   Modalities: PACEMAKER! Therapeutic Exercise: 45 min 45 min 30 min  45 min 45 min 45 min   nustep      L1 5 min   Sit to stand 3x10 with one pad and one hand on arm rest 3x10 with one pad and one hand  4x5 with one pad in chair and one hand for support 2x10 no pain and intermittent UE support 2x10    Long arc quad    2x10 B with 5#  2x10 B 5#   Standing marching 3 laps out side bars, SBA 2 laps outside bars with SBA  4 laps in // bars     Calf raises     x20 x30   Tandem walking  30 sec hold x 6 B with one finger on the bar x8 laps in II bars 4 laps in // bars 4 laps in //bars    SLS     30 sec hold x4 B with intermittent use of UE 30 sec hold x 4 with one finger on bar   airex 1 min eyes closed with intermittent use of hands  Romberg stance 2x1 min EO, 3x1 min EC  rhomberg 30 sec hold x4  rhomberg 30 sec hold x4 with intermittent use of UEs (no pad). Standing balance 1 min eyes open and 1 min eyes closed with CGA on foam   Step ups      Onto 4 in box x15 ea LE without hand rails, CGA   Weight shifting SLS on airex 30 sec hold x6 with UE support SLS 30 sec hold x6 with one finger on bar biosway lateral wt shift x3 min; fwd/back wt shift 3 min; postural stability x3 min       Split stance with trunk rotation 2x10 ea with trunk rotation with basketball 2x10 trunk rotation with basketball       Ball toss x10 on floor and 2x10 on airex pad to therapist x30 to therapist outside bars       Obstacle course Forward and lateral step over (6 hurdles) with SBA.  3 laps each 3 laps forward step overs  3 hurdles in // bars: 4 laps each lateral and forward 1 jr, foam pad and 4 in step. 4 laps each fowrad and lateral with CGA 6 hurdles, foam pad and 4 in box. x3 laps each SBA   Hip 3 way with band         Lateral band walks 2 laps outside bars, green   4 laps (no band) in // bars 4 L thick green band in // bars    Proprioceptive Activities:                                    Manual Therapy:                           Therapeutic Activities:                                      HEP: see 2/4/21 flow sheet for specifics. [employer] Portal  Treatment/Session Summary:    · Response to Treatment: Pt demonstrated more difficulty with lateral cone step overs but was able to complete dynamic and static balance activities without UE support. Continue POC. · Communication/Consultation:  None today  · Equipment provided today:  None today  · Recommendations/Intent for next treatment session: Next visit will focus on progression of strength and return to prior level of function.     Total Treatment Billable Duration:  45 minutes  PT Patient Time In/Time Out  Time In: 0330  Time Out: [address], JOSE MIGUEL    Future Appointments   Date Time Provider Myla Li   5/14/2021  3:30 PM Cori Cannon [facility]   5/18/2021  4:15 PM Arlene Rob PTA [facility]   5/20/2021  4:15 PM JOSE MIGUEL [facility]   5/25/2021  4:15 PM JOSE MIGUEL [facility]   5/28/2021  2:00 PM JOSE MIGUEL [facility]

## 2021-05-14 ENCOUNTER — HOSPITAL ENCOUNTER (OUTPATIENT)
Dept: PHYSICAL THERAPY | Age: 81
Discharge: HOME | End: 2021-05-14
Payer: MEDICARE

## 2021-05-14 PROCEDURE — 97110 THERAPEUTIC EXERCISES: CPT

## 2021-05-14 NOTE — PROGRESS NOTES
Yevgeniy Zoë  : 1940  [place]  Secondary: [place] [address]  [phone]   [phone]      OUTPATIENT PHYSICAL THERAPY: Daily Treatment Note  2021    ICD-10: Treatment Diagnosis: Difficulty in walking, not elsewhere classified (R26.2) and Other abnormalities of gait and mobility (R26.89)  Effective Dates: 21 to 21  (90 days). Frequency/Duration: 2 times a week for 90 Days  GOALS: (Goals have been discussed and agreed upon with patient.)  Short-Term Functional Goals: Time Frame: 2 weeks ongoing  1. Patient will be independent with HEP. (MET)  2. Patient will be able to maintain tandem stance for 5 seconds to improve ankle righting reactions for balance. (not met  3. Patient will improve 5x sit to stand to <20 seconds to decrease risk of falling. (not met)  Discharge Goals: Time Frame: 8 weeks (ALL ONGOING)  1. Patient will be able to perform 5x sit to  <15 seconds to decrease risk of falling. 2. Patient will be able maintain single leg stance for >5 seconds to improve safety with mobility on all surfaces. 3. Patient will be able to walk for >10 minutes to improve community mobility.      _________________________________________________________________________  Pre-treatment Symptoms/Complaints:  Pt reported no pain prior to treatment. Pain: Initial: 0/10   Post Session:  No increase/10   Medications Last Reviewed:  2021  Updated Objective Findings:  Below measures from initial evaluation unless otherwise noted. Observation/Orthostatic Postural Assessment:    No deformity or atrophy is noted. Ambulates with delayed gait speed. Palpation:    No specific palpable tenderness. 1+ edema present to the left shin.    ROM:    Ankle ROM is WNL  Knee ROM is WNL  Hip rotation is WNL  Hip extension is 0 ° B   Strength:   KNee extension: 4/5 B  Knee flexion: 4/5 B   SLR: 4/5 B  Hip abduction: 4/5 B  5x sit to stand: 25seconds  Special Tests:    Not applicable  Neurological Screen:  Grossly intact. Functional Mobility:   Ambulates with SPC in the RUE. Balance:    rhomberg with EO is normal, with EC increased postural sway. Maintains tandem stance for 3 seconds B; unable to perform single leg stance; 30second stool tap is 8x. Update: 3/3/21  Five Times sit to stand: 21 seconds with UE support  Single leg stance: able to maintain 3 sec without UE support. He is able to maintain SLS for 30 sec with one hand on the bar.    4/29/21 update:   SpO2: 99% in sitting   HR 84 in sitting  BP: 118/70  rhomberg stance with EO and EC is normal  Unable to assume tandem stance or single leg stance  5x sit to stand 19seconds (HR climbs to 107 after)     5/3/21   HR: 90 bpm in sitting  BP: 136/79 R arm  Unable to get an oxygen saturation reading (possibly due to cold hands)    5/6/21  HR: 72 bpm in sitting    5/10/21  HR: 82 bpm after recumbent bike in sitting     TREATMENT:   THERAPEUTIC ACTIVITY: ( see below for minutes): Therapeutic activities per grid below to improve mobility, strength, balance and coordination. Required minimal visual, verbal, manual and tactile cues to improve independence and safety with daily activities . THERAPEUTIC EXERCISE: (see below for minutes):  Exercises per grid below to improve mobility, strength, balance and coordination. Required minimal verbal and manual cues to promote proper body alignment, promote proper body posture and promote proper body mechanics. Progressed resistance, range, repetitions and complexity of movement as indicated. MANUAL THERAPY: (see below for minutes): Joint mobilization and Soft tissue mobilization was utilized and necessary because of the patient's restricted joint motion, painful spasm, loss of articular motion and restricted motion of soft tissue.    MODALITIES: (see below for minutes): to decrease pain    SELF CARE: (see below for minutes): Procedure(s) (per grid) utilized to improve and/or restore self-care/home management as related to dressing, bathing and grooming. Required minimal verbal cueing to facilitate activities of daily living skills and compensatory activities. Date: 3/8/21 (visit 10) 3/10/21 (visit 11) 6/18/13 (visit 12) recert 6/6/48 (visit 13) 5/6/21 (visit 14) 5/10/21 (visit 15) 5/14/21 (visit 16)   Modalities: PACEMAKER! Therapeutic Exercise: 45 min 45 min 30 min  45 min 45 min 45 min 45 min   nustep      L1 5 min    Sit to stand 3x10 with one pad and one hand on arm rest 3x10 with one pad and one hand  4x5 with one pad in chair and one hand for support 2x10 no pain and intermittent UE support 2x10  3x10 with UE support   Long arc quad    2x10 B with 5#  2x10 B 5#    Standing marching 3 laps out side bars, SBA 2 laps outside bars with SBA  4 laps in // bars   4 laps with intermittent UE   Calf raises     x20 x30    Tandem walking  30 sec hold x 6 B with one finger on the bar x8 laps in II bars 4 laps in // bars 4 laps in //bars  4 laps with UE support   SLS     30 sec hold x4 B with intermittent use of UE 30 sec hold x 4 with one finger on bar 30 sec hold x 4 B with one finger on bar   airex 1 min eyes closed with intermittent use of hands  Romberg stance 2x1 min EO, 3x1 min EC  rhomberg 30 sec hold x4  rhomberg 30 sec hold x4 with intermittent use of UEs (no pad).  Standing balance 1 min eyes open and 1 min eyes closed with CGA on foam Eyes open 2 min hold, eyes closed 1 min hold, narrow stance eyes open and eyes closed 1 min hold each   Step ups      Onto 4 in box x15 ea LE without hand rails, CGA x15 ea LE onto 6 in box (no UE support)   Weight shifting SLS on airex 30 sec hold x6 with UE support SLS 30 sec hold x6 with one finger on bar biosway lateral wt shift x3 min; fwd/back wt shift 3 min; postural stability x3 min Split stance with trunk rotation 2x10 ea with trunk rotation with basketball 2x10 trunk rotation with basketball     x20 each with 8# med ball   Suitcase carry with kettle bell       2 laps with 7.5# KB   Ball toss x10 on floor and 2x10 on airex pad to therapist x30 to therapist outside bars        Obstacle course Forward and lateral step over (6 hurdles) with SBA. 3 laps each 3 laps forward step overs  3 hurdles in // bars: 4 laps each lateral and forward 1 jr, foam pad and 4 in step. 4 laps each fowrad and lateral with CGA 6 hurdles, foam pad and 4 in box. x3 laps each SBA    Hip 3 way with band          Lateral band walks 2 laps outside bars, green   4 laps (no band) in // bars 4 L thick green band in // bars     Proprioceptive Activities:                                        Manual Therapy:                              Therapeutic Activities:                                          HEP: see 2/4/21 flow sheet for specifics. [email or web address] Portal  Treatment/Session Summary:    · Response to Treatment: Pt had mild difficulty with narrow stance eyes closed on the foam pad. Continue POC. · Communication/Consultation:  None today  · Equipment provided today:  None today  · Recommendations/Intent for next treatment session: Next visit will focus on progression of strength and return to prior level of function.     Total Treatment Billable Duration:  45 minutes  PT Patient Time In/Time Out  Time In: 0330  Time Out: Jesus Quiroga PTA    Future Appointments   Date Time Provider Myla Li   5/18/2021  3:30 PM Jocelyn Fajardo PTA [facility]   5/20/2021  3:30 PM JOSE MIGUEL [facility]   5/25/2021  4:15 PM [facility]   5/28/2021  2:00 PM JOSE MIGUEL AndersonOFILIA [facility]

## 2021-05-18 ENCOUNTER — HOSPITAL ENCOUNTER (OUTPATIENT)
Dept: PHYSICAL THERAPY | Age: 81
Discharge: HOME | End: 2021-05-18
Payer: MEDICARE

## 2021-05-18 PROCEDURE — 97110 THERAPEUTIC EXERCISES: CPT

## 2021-05-18 NOTE — PROGRESS NOTES
Cyndi Rodriguez  : 1940  [place]  Secondary: [place] @ [address], [place].  [phone]   [phone]      OUTPATIENT PHYSICAL THERAPY: Daily Treatment Note  2021    ICD-10: Treatment Diagnosis: Difficulty in walking, not elsewhere classified (R26.2) and Other abnormalities of gait and mobility (R26.89)  Effective Dates: 21 to 21  (90 days). Frequency/Duration: 2 times a week for 90 Days  GOALS: (Goals have been discussed and agreed upon with patient.)  Short-Term Functional Goals: Time Frame: 2 weeks ongoing  1. Patient will be independent with HEP. (MET)  2. Patient will be able to maintain tandem stance for 5 seconds to improve ankle righting reactions for balance. (not met  3. Patient will improve 5x sit to stand to <20 seconds to decrease risk of falling. (not met)  Discharge Goals: Time Frame: 8 weeks (ALL ONGOING)  1. Patient will be able to perform 5x sit to  <15 seconds to decrease risk of falling. 2. Patient will be able maintain single leg stance for >5 seconds to improve safety with mobility on all surfaces. 3. Patient will be able to walk for >10 minutes to improve community mobility.      _________________________________________________________________________  Pre-treatment Symptoms/Complaints:  Pt says did not sleep well last night \"don't feel good\" this morning, \"worn out\". Patient says usually comes to PT later in the day. Pain: Initial: 0/10   Post Session:  No increase/10   Medications Last Reviewed:  2021  Updated Objective Findings:  Below measures from initial evaluation unless otherwise noted. Observation/Orthostatic Postural Assessment:    No deformity or atrophy is noted. Ambulates with delayed gait speed. Palpation:    No specific palpable tenderness. 1+ edema present to the left shin.    ROM:    Ankle ROM is WNL  Knee ROM is WNL  Hip rotation is WNL  Hip extension is 0 ° B   Strength:   KNee extension: 4/5 B  Knee flexion: 4/5 B   SLR: 4/5 B  Hip abduction: 4/5 B  5x sit to stand: 25seconds  Special Tests:    Not applicable  Neurological Screen:  Grossly intact. Functional Mobility:   Ambulates with SPC in the RUE. Balance:    rhomberg with EO is normal, with EC increased postural sway. Maintains tandem stance for 3 seconds B; unable to perform single leg stance; 30second stool tap is 8x. Update: 3/3/21  Five Times sit to stand: 21 seconds with UE support  Single leg stance: able to maintain 3 sec without UE support. He is able to maintain SLS for 30 sec with one hand on the bar.    4/29/21 update:   SpO2: 99% in sitting   HR 84 in sitting  BP: 118/70  rhomberg stance with EO and EC is normal  Unable to assume tandem stance or single leg stance  5x sit to stand 19seconds (HR climbs to 107 after)     5/3/21   HR: 90 bpm in sitting  BP: 136/79 R arm  Unable to get an oxygen saturation reading (possibly due to cold hands)    5/6/21  HR: 72 bpm in sitting    5/10/21  HR: 82 bpm after recumbent bike in sitting     TREATMENT:   THERAPEUTIC ACTIVITY: ( see below for minutes): Therapeutic activities per grid below to improve mobility, strength, balance and coordination. Required minimal visual, verbal, manual and tactile cues to improve independence and safety with daily activities . THERAPEUTIC EXERCISE: (see below for minutes):  Exercises per grid below to improve mobility, strength, balance and coordination. Required minimal verbal and manual cues to promote proper body alignment, promote proper body posture and promote proper body mechanics. Progressed resistance, range, repetitions and complexity of movement as indicated.   MANUAL THERAPY: (see below for minutes): Joint mobilization and Soft tissue mobilization was utilized and necessary because of the patient's restricted joint motion, painful spasm, loss of articular motion and restricted motion of soft tissue. MODALITIES: (see below for minutes):      to decrease pain    SELF CARE: (see below for minutes): Procedure(s) (per grid) utilized to improve and/or restore self-care/home management as related to dressing, bathing and grooming. Required minimal verbal cueing to facilitate activities of daily living skills and compensatory activities. Date: 3/8/21 (visit 10) 3/10/21 (visit 11) 9/86/75 (visit 12) recert 7/1/38 (visit 13) 5/6/21 (visit 14) 5/10/21 (visit 15) 5/14/21 (visit 16) 5/18/21  (VISIT 17)   Modalities: PACEMAKER! Therapeutic Exercise: 45 min 45 min 30 min  45 min 45 min 45 min 45 min   45  mins   nustep      L1 5 min  Walking 2 laps in clinic as warm up   Sit to stand 3x10 with one pad and one hand on arm rest 3x10 with one pad and one hand  4x5 with one pad in chair and one hand for support 2x10 no pain and intermittent UE support 2x10  3x10 with UE support 3x5 chair plus 1 cushion   Long arc quad    2x10 B with 5#  2x10 B 5#  3# X10 B  HSC   Red tb x10 B   Standing marching 3 laps out side bars, SBA 2 laps outside bars with SBA  4 laps in // bars   4 laps with intermittent UE 3 laps in// bars   Calf raises     x20 x30  x15  Slantboard   Tandem walking  30 sec hold x 6 B with one finger on the bar x8 laps in II bars 4 laps in // bars 4 laps in //bars  4 laps with UE support    SLS     30 sec hold x4 B with intermittent use of UE 30 sec hold x 4 with one finger on bar 30 sec hold x 4 B with one finger on bar 30 sec hold x 2 B with one finger on bar   airex 1 min eyes closed with intermittent use of hands  Romberg stance 2x1 min EO, 3x1 min EC  rhomberg 30 sec hold x4  rhomberg 30 sec hold x4 with intermittent use of UEs (no pad).  Standing balance 1 min eyes open and 1 min eyes closed with CGA on foam Eyes open 2 min hold, eyes closed 1 min hold, narrow stance eyes open and eyes closed 1 min hold each 30 sec hold ea; eyes open, eyes closed, NBOS, WBOS,  tandem   Step ups      Onto 4 in box x15 ea LE without hand rails, CGA x15 ea LE onto 6 in box (no UE support) x10 B 6\"   UE support hand rails   Weight shifting SLS on airex 30 sec hold x6 with UE support SLS 30 sec hold x6 with one finger on bar biosway lateral wt shift x3 min; fwd/back wt shift 3 min; postural stability x3 min         Split stance with trunk rotation 2x10 ea with trunk rotation with basketball 2x10 trunk rotation with basketball     x20 each with 8# med ball    Suitcase carry with kettle bell       2 laps with 7.5# KB    Ball toss x10 on floor and 2x10 on airex pad to therapist x30 to therapist outside bars         Obstacle course Forward and lateral step over (6 hurdles) with SBA. 3 laps each 3 laps forward step overs  3 hurdles in // bars: 4 laps each lateral and forward 1 jr, foam pad and 4 in step. 4 laps each fowrad and lateral with CGA 6 hurdles, foam pad and 4 in box. x3 laps each SBA     Hip 3 way with band           Lateral band walks 2 laps outside bars, green   4 laps (no band) in // bars 4 L thick green band in // bars      Proprioceptive Activities:                                            Manual Therapy:                                 Therapeutic Activities:                                              HEP: see 2/4/21 flow sheet for specifics. [employer] Portal  Treatment/Session Summary:    · Response to Treatment: Pt arrives reporting \"not much energy\" , several rest breaks throughout treatment session today. Pt able to complete all TherEx activities without painful episodes, notable fatigue at end of session. Continue POC. · Communication/Consultation:  None today  · Equipment provided today:  None today  · Recommendations/Intent for next treatment session: Next visit will focus on progression of strength and return to prior level of function.     Total Treatment Billable Duration:  45 minutes  PT Patient Time In/Time Out  Time In: 08:45  Time Out: 09:30     Pravin Ghotra PTA    Future Appointments   Date Time Provider Myla Li   5/20/2021  3:30 PM Alex Ascencio PTA [facility]   5/25/2021  4:15 PM JOSE MIGUEL [facility]   5/28/2021  2:00 PM Brenda Brooks PTA [facility]

## 2021-05-20 ENCOUNTER — HOSPITAL ENCOUNTER (OUTPATIENT)
Dept: PHYSICAL THERAPY | Age: 81
Discharge: HOME | End: 2021-05-20
Payer: MEDICARE

## 2021-05-20 PROCEDURE — 97110 THERAPEUTIC EXERCISES: CPT

## 2021-05-20 NOTE — PROGRESS NOTES
Sho Martinez  : 1940  [place]  Secondary: [place] @ [address], [address] [place] [address]  [phone]   [phone]      OUTPATIENT PHYSICAL THERAPY: Daily Treatment Note  2021    ICD-10: Treatment Diagnosis: Difficulty in walking, not elsewhere classified (R26.2) and Other abnormalities of gait and mobility (R26.89)  Effective Dates: 21 to 21  (90 days). Frequency/Duration: 2 times a week for 90 Days  GOALS: (Goals have been discussed and agreed upon with patient.)  Short-Term Functional Goals: Time Frame: 2 weeks ongoing  1. Patient will be independent with HEP. (MET)  2. Patient will be able to maintain tandem stance for 5 seconds to improve ankle righting reactions for balance. (not met  3. Patient will improve 5x sit to stand to <20 seconds to decrease risk of falling. (not met)  Discharge Goals: Time Frame: 8 weeks (ALL ONGOING)  1. Patient will be able to perform 5x sit to  <15 seconds to decrease risk of falling. 2. Patient will be able maintain single leg stance for >5 seconds to improve safety with mobility on all surfaces. 3. Patient will be able to walk for >10 minutes to improve community mobility.      _________________________________________________________________________  Pre-treatment Symptoms/Complaints:  Pt reports overall \"feeling good\" at time of treatment today. Says with course of tx, feels LE are getting stronger. Pain: Initial: 0/10   Post Session:  No increase/10   Medications Last Reviewed:  2021  Updated Objective Findings:  Below measures from initial evaluation unless otherwise noted. Observation/Orthostatic Postural Assessment:    No deformity or atrophy is noted. Ambulates with delayed gait speed. Palpation:    No specific palpable tenderness. 1+ edema present to the left shin.    ROM:    Ankle ROM is WNL  Knee ROM is WNL  Hip rotation is WNL  Hip extension is 0 ° B   Strength:   KNee extension: 4/5 B  Knee flexion: 4/5 B   SLR: 4/5 B  Hip abduction: 4/5 B  5x sit to stand: 25seconds  Special Tests:    Not applicable  Neurological Screen:  Grossly intact. Functional Mobility:   Ambulates with SPC in the RUE. Balance:    rhomberg with EO is normal, with EC increased postural sway. Maintains tandem stance for 3 seconds B; unable to perform single leg stance; 30second stool tap is 8x. Update: 3/3/21  Five Times sit to stand: 21 seconds with UE support  Single leg stance: able to maintain 3 sec without UE support. He is able to maintain SLS for 30 sec with one hand on the bar.    4/29/21 update:   SpO2: 99% in sitting   HR 84 in sitting  BP: 118/70  rhomberg stance with EO and EC is normal  Unable to assume tandem stance or single leg stance  5x sit to stand 19seconds (HR climbs to 107 after)     5/3/21   HR: 90 bpm in sitting  BP: 136/79 R arm  Unable to get an oxygen saturation reading (possibly due to cold hands)    5/6/21  HR: 72 bpm in sitting    5/10/21  HR: 82 bpm after recumbent bike in sitting     TREATMENT:   THERAPEUTIC ACTIVITY: ( see below for minutes): Therapeutic activities per grid below to improve mobility, strength, balance and coordination. Required minimal visual, verbal, manual and tactile cues to improve independence and safety with daily activities . THERAPEUTIC EXERCISE: (see below for minutes):  Exercises per grid below to improve mobility, strength, balance and coordination. Required minimal verbal and manual cues to promote proper body alignment, promote proper body posture and promote proper body mechanics. Progressed resistance, range, repetitions and complexity of movement as indicated.   MANUAL THERAPY: (see below for minutes): Joint mobilization and Soft tissue mobilization was utilized and necessary because of the patient's restricted joint motion, painful spasm, loss of articular motion and restricted motion of soft tissue. MODALITIES: (see below for minutes):      to decrease pain    SELF CARE: (see below for minutes): Procedure(s) (per grid) utilized to improve and/or restore self-care/home management as related to dressing, bathing and grooming. Required minimal verbal cueing to facilitate activities of daily living skills and compensatory activities. Date: 3/8/21 (visit 10) 3/10/21 (visit 11) 0/29/64 (visit 12) recert 7/4/15 (visit 13) 5/6/21 (visit 14) 5/10/21 (visit 15) 5/14/21 (visit 16) 5/18/21  (VISIT 17) 5/20/21  (visit 18)   Modalities: PACEMAKER! Therapeutic Exercise: 45 min 45 min 30 min  45 min 45 min 45 min 45 min   45  mins   45 mins   nustep      L1 5 min  Walking 2 laps in clinic as warm up L2 7 min   Sit to stand 3x10 with one pad and one hand on arm rest 3x10 with one pad and one hand  4x5 with one pad in chair and one hand for support 2x10 no pain and intermittent UE support 2x10  3x10 with UE support 3x5 chair plus 1 cushion 2x10   Long arc quad    2x10 B with 5#  2x10 B 5#  3# X10 B  HSC   Red tb x10 B 3# X10 B  HSC   Red tb x10 B   Standing marching 3 laps out side bars, SBA 2 laps outside bars with SBA  4 laps in // bars   4 laps with intermittent UE 3 laps in// bars    Calf raises     x20 x30  x15  Slantboard x20 standing  x20 seated  x20 Seated DF red tb   Tandem walking  30 sec hold x 6 B with one finger on the bar x8 laps in II bars 4 laps in // bars 4 laps in //bars  4 laps with UE support  // bars on AirEx balance beam, 4 laps ea,  Tandem walking,  Side stepping    SLS     30 sec hold x4 B with intermittent use of UE 30 sec hold x 4 with one finger on bar 30 sec hold x 4 B with one finger on bar 30 sec hold x 2 B with one finger on bar    airex 1 min eyes closed with intermittent use of hands  Romberg stance 2x1 min EO, 3x1 min EC  rhomberg 30 sec hold x4  rhomberg 30 sec hold x4 with intermittent use of UEs (no pad). Standing balance 1 min eyes open and 1 min eyes closed with CGA on foam Eyes open 2 min hold, eyes closed 1 min hold, narrow stance eyes open and eyes closed 1 min hold each 30 sec hold ea; eyes open, eyes closed, NBOS, WBOS,  tandem    Step ups      Onto 4 in box x15 ea LE without hand rails, CGA x15 ea LE onto 6 in box (no UE support) x10 B 6\"   UE support hand rails x10 B 6\"   UE support hand rails; Lateral step up and over   Weight shifting SLS on airex 30 sec hold x6 with UE support SLS 30 sec hold x6 with one finger on bar biosway lateral wt shift x3 min; fwd/back wt shift 3 min; postural stability x3 min          Split stance with trunk rotation 2x10 ea with trunk rotation with basketball 2x10 trunk rotation with basketball     x20 each with 8# med ball  x20 each with 8# med ball   Suitcase carry with kettle bell       2 laps with 7.5# KB     Ball toss x10 on floor and 2x10 on airex pad to therapist x30 to therapist outside bars          Obstacle course Forward and lateral step over (6 hurdles) with SBA. 3 laps each 3 laps forward step overs  3 hurdles in // bars: 4 laps each lateral and forward 1 jr, foam pad and 4 in step. 4 laps each fowrad and lateral with CGA 6 hurdles, foam pad and 4 in box. x3 laps each SBA      Hip 3 way with band            Lateral band walks 2 laps outside bars, green   4 laps (no band) in // bars 4 L thick green band in // bars       Proprioceptive Activities:                                    Manual Therapy:                                    Therapeutic Activities:                                      HEP: see 2/4/21 flow sheet for specifics. [employer] Portal  Treatment/Session Summary:    · Response to Treatment: Pt with better tolerance of TherEx activities today. Improving balance response. Pt able to complete all TherEx activities without painful episodes. Continue POC.   · Communication/Consultation:  None today  · Equipment provided today:  None today  · Recommendations/Intent for next treatment session: Next visit will focus on progression of strength and return to prior level of function.     Total Treatment Billable Duration:  45 minutes  PT Patient Time In/Time Out  Time In: 11:45  Time Out: 12:30     Bertha Duggan PTA    Future Appointments   Date Time Provider Myla Li   5/25/2021  4:15 PM Jennings Josseline, [place] [facility]   5/28/2021  2:00 PM Jennings Starring, PTA [facility]   6/9/2021  2:00 PM Jennings Starring, PTA [facility]   6/11/2021  2:45 PM Geneva Eisenmenger, [facility]   6/14/2021  2:45 PM Tanya ZARATE, PTA [facility]   6/17/2021  2:00 PM CHAYO HudsonT [facility]   6/21/2021  2:00 PM Jennings Starlit, PTA [facility]   6/24/2021  2:00 PM David Colon, DPT [facility]   6/28/2021  2:00 PM Jennings Starlit, PTA [facility]

## 2021-05-25 ENCOUNTER — HOSPITAL ENCOUNTER (OUTPATIENT)
Dept: PHYSICAL THERAPY | Age: 81
Discharge: HOME | End: 2021-05-25
Payer: MEDICARE

## 2021-05-28 ENCOUNTER — HOSPITAL ENCOUNTER (OUTPATIENT)
Dept: PHYSICAL THERAPY | Age: 81
Discharge: HOME | End: 2021-05-28
Payer: MEDICARE

## 2021-05-28 PROCEDURE — 97110 THERAPEUTIC EXERCISES: CPT

## 2021-05-28 NOTE — PROGRESS NOTES
Grisel Roberts  : 1940  [place]  [place]: [place] [facility] [address]  [phone]   [phone]      OUTPATIENT PHYSICAL THERAPY: Daily Treatment Note  2021    ICD-10: Treatment Diagnosis: Difficulty in walking, not elsewhere classified (R26.2) and Other abnormalities of gait and mobility (R26.89)  Effective Dates: 21 to 21  (90 days). Frequency/Duration: 2 times a week for 90 Days  GOALS: (Goals have been discussed and agreed upon with patient.)  Short-Term Functional Goals: Time Frame: 2 weeks ongoing  1. Patient will be independent with HEP. (MET)  2. Patient will be able to maintain tandem stance for 5 seconds to improve ankle righting reactions for balance. (not met  3. Patient will improve 5x sit to stand to <20 seconds to decrease risk of falling. (not met)  Discharge Goals: Time Frame: 8 weeks (ALL ONGOING)  1. Patient will be able to perform 5x sit to  <15 seconds to decrease risk of falling. 2. Patient will be able maintain single leg stance for >5 seconds to improve safety with mobility on all surfaces. 3. Patient will be able to walk for >10 minutes to improve community mobility.      _________________________________________________________________________  Pre-treatment Symptoms/Complaints:  Pt reports no pain. Pain: Initial: 0/10   Post Session:  No increase/10   Medications Last Reviewed:  2021  Updated Objective Findings:  Below measures from initial evaluation unless otherwise noted. Observation/Orthostatic Postural Assessment:    No deformity or atrophy is noted. Ambulates with delayed gait speed. Palpation:    No specific palpable tenderness. 1+ edema present to the left shin.    ROM:    Ankle ROM is WNL  Knee ROM is WNL  Hip rotation is WNL  Hip extension is 0 ° B   Strength:   KNee extension: 4/5 B  Knee flexion: 4/5 B   SLR: 4/5 B  Hip abduction: 4/5 B  5x sit to stand: 25seconds  Special Tests:    Not applicable  Neurological Screen:  Grossly intact. Functional Mobility:   Ambulates with SPC in the RUE. Balance:    rhomberg with EO is normal, with EC increased postural sway. Maintains tandem stance for 3 seconds B; unable to perform single leg stance; 30second stool tap is 8x. Update: 3/3/21  Five Times sit to stand: 21 seconds with UE support  Single leg stance: able to maintain 3 sec without UE support. He is able to maintain SLS for 30 sec with one hand on the bar.    4/29/21 update:   SpO2: 99% in sitting   HR 84 in sitting  BP: 118/70  rhomberg stance with EO and EC is normal  Unable to assume tandem stance or single leg stance  5x sit to stand 19seconds (HR climbs to 107 after)     5/3/21   HR: 90 bpm in sitting  BP: 136/79 R arm  Unable to get an oxygen saturation reading (possibly due to cold hands)    5/6/21  HR: 72 bpm in sitting    5/10/21  HR: 82 bpm after recumbent bike in sitting     TREATMENT:   THERAPEUTIC ACTIVITY: ( see below for minutes): Therapeutic activities per grid below to improve mobility, strength, balance and coordination. Required minimal visual, verbal, manual and tactile cues to improve independence and safety with daily activities . THERAPEUTIC EXERCISE: (see below for minutes):  Exercises per grid below to improve mobility, strength, balance and coordination. Required minimal verbal and manual cues to promote proper body alignment, promote proper body posture and promote proper body mechanics. Progressed resistance, range, repetitions and complexity of movement as indicated. MANUAL THERAPY: (see below for minutes): Joint mobilization and Soft tissue mobilization was utilized and necessary because of the patient's restricted joint motion, painful spasm, loss of articular motion and restricted motion of soft tissue.    MODALITIES: (see below for minutes):      to decrease pain    SELF CARE: (see below for minutes): Procedure(s) (per grid) utilized to improve and/or restore self-care/home management as related to dressing, bathing and grooming. Required minimal verbal cueing to facilitate activities of daily living skills and compensatory activities. Date: 3/8/21 (visit 10) 3/10/21 (visit 11) 2/95/40 (visit 12) recert 2/4/58 (visit 13) 5/6/21 (visit 14) 5/10/21 (visit 15) 5/14/21 (visit 16) 5/18/21  (VISIT 17) 5/20/21  (visit 18) 5/28/21 (visit 19)   Modalities: PACEMAKER!                                        Therapeutic Exercise: 45 min 45 min 30 min  45 min 45 min 45 min 45 min   45  mins   45 mins 45 min   nustep      L1 5 min  Walking 2 laps in clinic as warm up L2 7 min    Sit to stand 3x10 with one pad and one hand on arm rest 3x10 with one pad and one hand  4x5 with one pad in chair and one hand for support 2x10 no pain and intermittent UE support 2x10  3x10 with UE support 3x5 chair plus 1 cushion 2x10 3x10 from lavender chair with on arms, with UE   Long arc quad    2x10 B with 5#  2x10 B 5#  3# X10 B  HSC   Red tb x10 B 3# X10 B  HSC   Red tb x10 B    Standing marching 3 laps out side bars, SBA 2 laps outside bars with SBA  4 laps in // bars   4 laps with intermittent UE 3 laps in// bars  4 laps in // bars with pause   Calf raises     x20 x30  x15  Slantboard x20 standing  x20 seated  x20 Seated DF red tb    Tandem walking  30 sec hold x 6 B with one finger on the bar x8 laps in II bars 4 laps in // bars 4 laps in //bars  4 laps with UE support  // bars on AirEx balance beam, 4 laps ea,  Tandem walking,  Side stepping  4 laps in // bars   SLS     30 sec hold x4 B with intermittent use of UE 30 sec hold x 4 with one finger on bar 30 sec hold x 4 B with one finger on bar 30 sec hold x 2 B with one finger on bar     airex 1 min eyes closed with intermittent use of hands  Romberg stance 2x1 min EO, 3x1 min EC  rhomberg 30 sec hold x4  rhomberg 30 sec hold x4 with intermittent use of UEs (no pad). Standing balance 1 min eyes open and 1 min eyes closed with CGA on foam Eyes open 2 min hold, eyes closed 1 min hold, narrow stance eyes open and eyes closed 1 min hold each 30 sec hold ea; eyes open, eyes closed, NBOS, WBOS,  tandem     Step ups      Onto 4 in box x15 ea LE without hand rails, CGA x15 ea LE onto 6 in box (no UE support) x10 B 6\"   UE support hand rails x10 B 6\"   UE support hand rails; Lateral step up and over    Weight shifting SLS on airex 30 sec hold x6 with UE support SLS 30 sec hold x6 with one finger on bar biosway lateral wt shift x3 min; fwd/back wt shift 3 min; postural stability x3 min           Split stance with trunk rotation 2x10 ea with trunk rotation with basketball 2x10 trunk rotation with basketball     x20 each with 8# med ball  x20 each with 8# med ball x20 ea with 4# med ball   Suitcase carry with [employer] bell       2 laps with 7.5# KB      Ball toss x10 on floor and 2x10 on airex pad to therapist x30 to therapist outside bars           Obstacle course Forward and lateral step over (6 hurdles) with SBA. 3 laps each 3 laps forward step overs  3 hurdles in // bars: 4 laps each lateral and forward 1 jr, foam pad and 4 in step. 4 laps each fowrad and lateral with CGA 6 hurdles, foam pad and 4 in box. x3 laps each SBA    4 in step, foam pad, and foam bar. 3 laps ea (forward and lateral) with CGA   Hip 3 way with band             Lateral band walks 2 laps outside bars, green   4 laps (no band) in // bars 4 L thick green band in // bars     4 laps thick green band in // bars   Proprioceptive Activities:                                       Manual Therapy:                                       Therapeutic Activities:                                         HEP: see 2/4/21 flow sheet for specifics.     [employer] Portal  Treatment/Session Summary:    · Response to Treatment: Pt took intermittent rest breaks but he demonstrates improved balance with the obstacle course. Continue POC. · Communication/Consultation:  None today  · Equipment provided today:  None today  · Recommendations/Intent for next treatment session: Next visit will focus on progression of strength and return to prior level of function.     Total Treatment Billable Duration:  45 minutes    PT Patient Time In/Time Out  Time In: 0200  Time Out: [place] Sutherland, JOSE MIGUEL    Future Appointments   Date Time Provider Myla Li   6/10/2021  3:30 PM Cori Uribe [facility]   6/11/2021  2:45 PM Saloni Hatfield PTA [facility]   6/14/2021  2:45 PM Ange ZARATE PTA [facility]   6/17/2021  2:00 PM Anna Fan, DPT [facility]   6/21/2021  2:00 PM Stiven Guard, JOSE MIGUEL [facility]   6/24/2021  2:00 PM Anna Fan, DPT [facility]   6/28/2021  2:00 PM Stiven Guard, [facility]

## 2021-06-09 ENCOUNTER — APPOINTMENT (OUTPATIENT)
Dept: PHYSICAL THERAPY | Age: 81
End: 2021-06-09
Payer: MEDICARE

## 2021-06-10 ENCOUNTER — HOSPITAL ENCOUNTER (OUTPATIENT)
Dept: PHYSICAL THERAPY | Age: 81
Discharge: HOME | End: 2021-06-10
Payer: MEDICARE

## 2021-06-10 PROCEDURE — 97110 THERAPEUTIC EXERCISES: CPT

## 2021-06-10 NOTE — PROGRESS NOTES
Lacie Mccabe  : 1940  [place]  Secondary: Matt Quinn @ [address], [address] [place] [address]  [phone]   [phone]      OUTPATIENT PHYSICAL THERAPY: Daily Treatment Note and Discharge  6/10/2021    ICD-10: Treatment Diagnosis: Difficulty in walking, not elsewhere classified (R26.2) and Other abnormalities of gait and mobility (R26.89)  Effective Dates: 21 to 21  (90 days). Frequency/Duration: 2 times a week for 90 Days  GOALS: (Goals have been discussed and agreed upon with patient.)  Short-Term Functional Goals: Time Frame: 2 weeks ongoing  1. Patient will be independent with HEP. (MET)  2. Patient will be able to maintain tandem stance for 5 seconds to improve ankle righting reactions for balance. (MET)  3. Patient will improve 5x sit to stand to <20 seconds to decrease risk of falling. (MET)  Discharge Goals: Time Frame: 8 weeks  1. Patient will be able to perform 5x sit to  <15 seconds to decrease risk of falling. (MET)  2. Patient will be able maintain single leg stance for >5 seconds to improve safety with mobility on all surfaces. (NOT MET)  3. Patient will be able to walk for >10 minutes to improve community mobility. (MET, pt states he is able to shop for 35 min in [facility])      _________________________________________________________________________  Pre-treatment Symptoms/Complaints:  Pt reports no pain. Pain: Initial: 0/10   Post Session:  No increase/10   Medications Last Reviewed:  6/10/2021  Updated Objective Findings:  Below measures from initial evaluation unless otherwise noted. Observation/Orthostatic Postural Assessment:    No deformity or atrophy is noted. Ambulates with delayed gait speed. Palpation:    No specific palpable tenderness. 1+ edema present to the left shin.    ROM:    Ankle ROM is WNL  Knee ROM is WNL  Hip rotation is WNL  Hip extension is 0 ° B   Strength:   KNee extension: 4/5 B  Knee flexion: 4/5 B   SLR: 4/5 B  Hip abduction: 4/5 B  5x sit to stand: 25seconds  Special Tests:    Not applicable  Neurological Screen:  Grossly intact. Functional Mobility:   Ambulates with SPC in the RUE. Balance:    rhomberg with EO is normal, with EC increased postural sway. Maintains tandem stance for 3 seconds B; unable to perform single leg stance; 30second stool tap is 8x. Update: 3/3/21  Five Times sit to stand: 21 seconds with UE support  Single leg stance: able to maintain 3 sec without UE support. He is able to maintain SLS for 30 sec with one hand on the bar.    4/29/21 update:   SpO2: 99% in sitting   HR 84 in sitting  BP: 118/70  rhomberg stance with EO and EC is normal  Unable to assume tandem stance or single leg stance  5x sit to stand 19seconds (HR climbs to 107 after)     5/3/21   HR: 90 bpm in sitting  BP: 136/79 R arm  Unable to get an oxygen saturation reading (possibly due to cold hands)    5/6/21  HR: 72 bpm in sitting    5/10/21  HR: 82 bpm after recumbent bike in sitting    6/10/21   Five Times Sit to Stand: 15 seconds  Tandem stance 20 sec with R foot in front, 5 sec with L foot in front. No UE support  SLS: unable to perform without one finger on the bar       TREATMENT:   THERAPEUTIC ACTIVITY: ( see below for minutes): Therapeutic activities per grid below to improve mobility, strength, balance and coordination. Required minimal visual, verbal, manual and tactile cues to improve independence and safety with daily activities . THERAPEUTIC EXERCISE: (see below for minutes):  Exercises per grid below to improve mobility, strength, balance and coordination. Required minimal verbal and manual cues to promote proper body alignment, promote proper body posture and promote proper body mechanics. Progressed resistance, range, repetitions and complexity of movement as indicated.   MANUAL THERAPY: (see below for minutes): Joint mobilization and Soft tissue mobilization was utilized and necessary because of the patient's restricted joint motion, painful spasm, loss of articular motion and restricted motion of soft tissue. MODALITIES: (see below for minutes):      to decrease pain    SELF CARE: (see below for minutes): Procedure(s) (per grid) utilized to improve and/or restore self-care/home management as related to dressing, bathing and grooming. Required minimal verbal cueing to facilitate activities of daily living skills and compensatory activities. Date: 7/03/61 (visit 12) recert 5/8/76 (visit 13) 5/6/21 (visit 14) 5/10/21 (visit 15) 5/14/21 (visit 16) 5/18/21  (VISIT 17) 5/20/21  (visit 18) 5/28/21 (visit 19) 6/10/21 (visit 20)   Modalities: PACEMAKER!                                     Therapeutic Exercise: 30 min  45 min 45 min 45 min 45 min   45  mins   45 mins 45 min 45 min   nustep    L1 5 min  Walking 2 laps in clinic as warm up L2 7 min     Sit to stand  4x5 with one pad in chair and one hand for support 2x10 no pain and intermittent UE support 2x10  3x10 with UE support 3x5 chair plus 1 cushion 2x10 3x10 from lavender chair with on arms, with UE 2x10 from chair with one pad   Long arc quad  2x10 B with 5#  2x10 B 5#  3# X10 B  HSC   Red tb x10 B 3# X10 B  HSC   Red tb x10 B     Standing marching  4 laps in // bars   4 laps with intermittent UE 3 laps in// bars  4 laps in // bars with pause    Calf raises   x20 x30  x15  Slantboard x20 standing  x20 seated  x20 Seated DF red tb  x30 no UE support   Tandem walking x8 laps in II bars 4 laps in // bars 4 laps in //bars  4 laps with UE support  // bars on [employer] balance beam, 4 laps ea,  Tandem walking,  Side stepping  4 laps in // bars 4 laps in // bars   SLS   30 sec hold x4 B with intermittent use of UE 30 sec hold x 4 with one finger on bar 30 sec hold x 4 B with one finger on bar 30 sec hold x 2 B with one finger on bar   30 sec hold x 4 B with one finger on bar airex Romberg stance 2x1 min EO, 3x1 min EC  rhomberg 30 sec hold x4  rhomberg 30 sec hold x4 with intermittent use of UEs (no pad). Standing balance 1 min eyes open and 1 min eyes closed with CGA on foam Eyes open 2 min hold, eyes closed 1 min hold, narrow stance eyes open and eyes closed 1 min hold each 30 sec hold ea; eyes open, eyes closed, NBOS, WBOS,  tandem      Step ups    Onto 4 in box x15 ea LE without hand rails, CGA x15 ea LE onto 6 in box (no UE support) x10 B 6\"   UE support hand rails x10 B 6\"   UE support hand rails; Lateral step up and over     Weight shifting biosway lateral wt shift x3 min; fwd/back wt shift 3 min; postural stability x3 min            Split stance with trunk rotation     x20 each with 8# med ball  x20 each with 8# med ball x20 ea with 4# med ball Split stance x10 B trunk rotation   Suitcase carry with [employer] bell     2 laps with 7.5# KB       Obstacle course  3 hurdles in // bars: 4 laps each lateral and forward 1 jr, foam pad and 4 in step. 4 laps each fowrad and lateral with CGA 6 hurdles, foam pad and 4 in box. x3 laps each SBA    4 in step, foam pad, and foam bar. 3 laps ea (forward and lateral) with CGA Reverse lunge with slider x10 B and hip ABD x10 B LE with slider and UE support   Hip 3 way with band            Lateral band walks  4 laps (no band) in // bars 4 L thick green band in // bars     4 laps thick green band in // bars 4 laps with thick green band in// bar   Proprioceptive Activities:                                    Manual Therapy:                                    Therapeutic Activities:                                      HEP: see 2/4/21 flow sheet for specifics. [employer] [facility]  Treatment/Session Summary:    · Response to Treatment: Pt requests to be discharge because he feels like he is back to normal. He is very active with his [employer] choir and singing group. He has met his goals. Pt is discharged to the HEP.  The above exercises were reviewed for the HEP. · Communication/Consultation:  None today  · Equipment provided today:  None today    Total Treatment Billable Duration:  45 minutes    PT Patient Time In/Time Out  Time In: 0330  Time Out: Jesus Quiroga PTA    No future appointments.

## 2021-06-11 ENCOUNTER — APPOINTMENT (OUTPATIENT)
Dept: PHYSICAL THERAPY | Age: 81
End: 2021-06-11
Payer: MEDICARE

## 2021-06-14 ENCOUNTER — APPOINTMENT (OUTPATIENT)
Dept: PHYSICAL THERAPY | Age: 81
End: 2021-06-14
Payer: MEDICARE

## 2021-06-16 ENCOUNTER — APPOINTMENT (OUTPATIENT)
Dept: PHYSICAL THERAPY | Age: 81
End: 2021-06-16
Payer: MEDICARE

## 2021-06-17 ENCOUNTER — APPOINTMENT (OUTPATIENT)
Dept: PHYSICAL THERAPY | Age: 81
End: 2021-06-17
Payer: MEDICARE

## 2021-06-18 ENCOUNTER — APPOINTMENT (OUTPATIENT)
Dept: PHYSICAL THERAPY | Age: 81
End: 2021-06-18
Payer: MEDICARE

## 2021-06-21 ENCOUNTER — APPOINTMENT (OUTPATIENT)
Dept: PHYSICAL THERAPY | Age: 81
End: 2021-06-21
Payer: MEDICARE

## 2021-06-24 ENCOUNTER — APPOINTMENT (OUTPATIENT)
Dept: PHYSICAL THERAPY | Age: 81
End: 2021-06-24
Payer: MEDICARE

## 2021-06-28 ENCOUNTER — APPOINTMENT (OUTPATIENT)
Dept: PHYSICAL THERAPY | Age: 81
End: 2021-06-28
Payer: MEDICARE

## 2021-07-06 ENCOUNTER — APPOINTMENT (OUTPATIENT)
Dept: PHYSICAL THERAPY | Age: 81
End: 2021-07-06

## 2021-07-09 ENCOUNTER — APPOINTMENT (OUTPATIENT)
Dept: PHYSICAL THERAPY | Age: 81
End: 2021-07-09

## 2021-07-14 ENCOUNTER — APPOINTMENT (OUTPATIENT)
Dept: PHYSICAL THERAPY | Age: 81
End: 2021-07-14

## 2021-07-16 ENCOUNTER — APPOINTMENT (OUTPATIENT)
Dept: PHYSICAL THERAPY | Age: 81
End: 2021-07-16

## 2021-07-20 ENCOUNTER — APPOINTMENT (OUTPATIENT)
Dept: PHYSICAL THERAPY | Age: 81
End: 2021-07-20

## 2021-07-22 ENCOUNTER — APPOINTMENT (OUTPATIENT)
Dept: PHYSICAL THERAPY | Age: 81
End: 2021-07-22

## 2021-07-27 ENCOUNTER — APPOINTMENT (OUTPATIENT)
Dept: PHYSICAL THERAPY | Age: 81
End: 2021-07-27

## 2021-07-29 ENCOUNTER — APPOINTMENT (OUTPATIENT)
Dept: PHYSICAL THERAPY | Age: 81
End: 2021-07-29

## 2022-03-19 PROBLEM — M80.08XA VERTEBRAL FRACTURE, OSTEOPOROTIC (HCC): Status: ACTIVE | Noted: 2017-01-17

## (undated) DEVICE — DRAPE SHT 3 QTR PROXIMA 53X77 --

## (undated) DEVICE — AMD ANTIMICROBIAL NON-ADHERENT PAD,0.2% POLYHEXAMETHYLENE BIGUANIDE HCI (PHMB): Brand: TELFA

## (undated) DEVICE — PAD,NON-ADHERENT,3X8,STERILE,LF,1/PK: Brand: MEDLINE

## (undated) DEVICE — 1010 S-DRAPE TOWEL DRAPE 10/BX: Brand: STERI-DRAPE™

## (undated) DEVICE — X-LARGE COTTON GLOVE: Brand: DEROYAL

## (undated) DEVICE — BONE BIOPSY DEVICE F05A BBD SIZE 3: Brand: MEDTRONIC REUSABLE INSTRUMENTS

## (undated) DEVICE — KIT POS W/ FOAM ARM CRADL SHEARGUARD CHST PD CVR FOR SPNL

## (undated) DEVICE — KENDALL SCD EXPRESS SLEEVES, KNEE LENGTH, MEDIUM: Brand: KENDALL SCD

## (undated) DEVICE — NEEDLE HYPO 21GA L1.5IN INTRAMUSCULAR S STL LATCH BVL UP

## (undated) DEVICE — PACK PROCEDURE SURG POST LAMINECTOMY CDS

## (undated) DEVICE — (D)PREP SKN CHLRAPRP APPL 26ML -- CONVERT TO ITEM 371833

## (undated) DEVICE — SUTURE ETHLN SZ 2-0 L18IN NONABSORBABLE BLK L26MM PS 3/8 585H

## (undated) DEVICE — 3M™ TEGADERM™ TRANSPARENT FILM DRESSING FRAME STYLE, 1624W, 2-3/8 IN X 2-3/4 IN (6 CM X 7 CM), 100/CT 4CT/CASE: Brand: 3M™ TEGADERM™

## (undated) DEVICE — DRAPE XR C ARM 41X74IN LF --

## (undated) DEVICE — INTENDED FOR TISSUE SEPARATION, AND OTHER PROCEDURES THAT REQUIRE A SHARP SURGICAL BLADE TO PUNCTURE OR CUT.: Brand: BARD-PARKER SAFETY BLADES SIZE 15, STERILE

## (undated) DEVICE — CURETTE A13A SIZE 2 T-TIP: Brand: KYPHON® EXPRESS ™ CURETTE

## (undated) DEVICE — SYR LR LCK 1ML GRAD NSAF 30ML --

## (undated) DEVICE — BONE TAMP KIT KPX203PB FF X2 20/3 1 STP: Brand: KYPHOPAK™ TRAY